# Patient Record
Sex: MALE | Race: OTHER | HISPANIC OR LATINO | Employment: FULL TIME | URBAN - METROPOLITAN AREA
[De-identification: names, ages, dates, MRNs, and addresses within clinical notes are randomized per-mention and may not be internally consistent; named-entity substitution may affect disease eponyms.]

---

## 2018-08-29 ENCOUNTER — HOSPITAL ENCOUNTER (EMERGENCY)
Facility: HOSPITAL | Age: 28
Discharge: LEFT BEFORE DISCHARGE | End: 2018-08-29
Attending: EMERGENCY MEDICINE
Payer: COMMERCIAL

## 2018-08-29 ENCOUNTER — APPOINTMENT (EMERGENCY)
Dept: RADIOLOGY | Facility: HOSPITAL | Age: 28
End: 2018-08-29
Payer: COMMERCIAL

## 2018-08-29 VITALS
OXYGEN SATURATION: 97 % | HEART RATE: 83 BPM | TEMPERATURE: 98.6 F | RESPIRATION RATE: 16 BRPM | SYSTOLIC BLOOD PRESSURE: 130 MMHG | WEIGHT: 250 LBS | DIASTOLIC BLOOD PRESSURE: 68 MMHG

## 2018-08-29 DIAGNOSIS — R07.9 CHEST PAIN: Primary | ICD-10-CM

## 2018-08-29 LAB
ANION GAP SERPL CALCULATED.3IONS-SCNC: 4 MMOL/L (ref 4–13)
ATRIAL RATE: 83 BPM
BASOPHILS # BLD AUTO: 0.05 THOUSANDS/ΜL (ref 0–0.1)
BASOPHILS NFR BLD AUTO: 1 % (ref 0–1)
BUN SERPL-MCNC: 12 MG/DL (ref 5–25)
CALCIUM SERPL-MCNC: 9.4 MG/DL (ref 8.3–10.1)
CHLORIDE SERPL-SCNC: 104 MMOL/L (ref 100–108)
CO2 SERPL-SCNC: 30 MMOL/L (ref 21–32)
CREAT SERPL-MCNC: 1.06 MG/DL (ref 0.6–1.3)
EOSINOPHIL # BLD AUTO: 0.03 THOUSAND/ΜL (ref 0–0.61)
EOSINOPHIL NFR BLD AUTO: 0 % (ref 0–6)
ERYTHROCYTE [DISTWIDTH] IN BLOOD BY AUTOMATED COUNT: 12.5 % (ref 11.6–15.1)
GFR SERPL CREATININE-BSD FRML MDRD: 95 ML/MIN/1.73SQ M
GLUCOSE SERPL-MCNC: 111 MG/DL (ref 65–140)
HCT VFR BLD AUTO: 45.3 % (ref 36.5–49.3)
HGB BLD-MCNC: 14.8 G/DL (ref 12–17)
IMM GRANULOCYTES # BLD AUTO: 0.03 THOUSAND/UL (ref 0–0.2)
IMM GRANULOCYTES NFR BLD AUTO: 0 % (ref 0–2)
LYMPHOCYTES # BLD AUTO: 2.28 THOUSANDS/ΜL (ref 0.6–4.47)
LYMPHOCYTES NFR BLD AUTO: 29 % (ref 14–44)
MCH RBC QN AUTO: 30.1 PG (ref 26.8–34.3)
MCHC RBC AUTO-ENTMCNC: 32.7 G/DL (ref 31.4–37.4)
MCV RBC AUTO: 92 FL (ref 82–98)
MONOCYTES # BLD AUTO: 0.55 THOUSAND/ΜL (ref 0.17–1.22)
MONOCYTES NFR BLD AUTO: 7 % (ref 4–12)
NEUTROPHILS # BLD AUTO: 4.85 THOUSANDS/ΜL (ref 1.85–7.62)
NEUTS SEG NFR BLD AUTO: 63 % (ref 43–75)
NRBC BLD AUTO-RTO: 0 /100 WBCS
P AXIS: 59 DEGREES
PLATELET # BLD AUTO: 305 THOUSANDS/UL (ref 149–390)
PMV BLD AUTO: 10.2 FL (ref 8.9–12.7)
POTASSIUM SERPL-SCNC: 4 MMOL/L (ref 3.5–5.3)
PR INTERVAL: 144 MS
QRS AXIS: 55 DEGREES
QRSD INTERVAL: 68 MS
QT INTERVAL: 338 MS
QTC INTERVAL: 397 MS
RBC # BLD AUTO: 4.92 MILLION/UL (ref 3.88–5.62)
SODIUM SERPL-SCNC: 138 MMOL/L (ref 136–145)
T WAVE AXIS: 24 DEGREES
TROPONIN I SERPL-MCNC: <0.02 NG/ML
VENTRICULAR RATE: 83 BPM
WBC # BLD AUTO: 7.79 THOUSAND/UL (ref 4.31–10.16)

## 2018-08-29 PROCEDURE — 36415 COLL VENOUS BLD VENIPUNCTURE: CPT | Performed by: EMERGENCY MEDICINE

## 2018-08-29 PROCEDURE — 96375 TX/PRO/DX INJ NEW DRUG ADDON: CPT

## 2018-08-29 PROCEDURE — 80048 BASIC METABOLIC PNL TOTAL CA: CPT | Performed by: EMERGENCY MEDICINE

## 2018-08-29 PROCEDURE — 85025 COMPLETE CBC W/AUTO DIFF WBC: CPT | Performed by: EMERGENCY MEDICINE

## 2018-08-29 PROCEDURE — 93005 ELECTROCARDIOGRAM TRACING: CPT

## 2018-08-29 PROCEDURE — 84484 ASSAY OF TROPONIN QUANT: CPT | Performed by: EMERGENCY MEDICINE

## 2018-08-29 PROCEDURE — 99285 EMERGENCY DEPT VISIT HI MDM: CPT

## 2018-08-29 PROCEDURE — 71046 X-RAY EXAM CHEST 2 VIEWS: CPT

## 2018-08-29 PROCEDURE — 96374 THER/PROPH/DIAG INJ IV PUSH: CPT

## 2018-08-29 PROCEDURE — 93010 ELECTROCARDIOGRAM REPORT: CPT | Performed by: INTERNAL MEDICINE

## 2018-08-29 RX ORDER — METOCLOPRAMIDE HYDROCHLORIDE 5 MG/ML
10 INJECTION INTRAMUSCULAR; INTRAVENOUS ONCE
Status: COMPLETED | OUTPATIENT
Start: 2018-08-29 | End: 2018-08-29

## 2018-08-29 RX ORDER — KETOROLAC TROMETHAMINE 30 MG/ML
15 INJECTION, SOLUTION INTRAMUSCULAR; INTRAVENOUS ONCE
Status: COMPLETED | OUTPATIENT
Start: 2018-08-29 | End: 2018-08-29

## 2018-08-29 RX ADMIN — KETOROLAC TROMETHAMINE 15 MG: 30 INJECTION, SOLUTION INTRAMUSCULAR at 13:10

## 2018-08-29 RX ADMIN — METOCLOPRAMIDE 10 MG: 5 INJECTION, SOLUTION INTRAMUSCULAR; INTRAVENOUS at 13:12

## 2018-08-29 NOTE — DISCHARGE INSTRUCTIONS
Dolor torácico, cuidados ambulatorios   INFORMACIÓN GENERAL:   El dolor torácico  puede ser causado por jenaro variedad de condiciones, algunas no gage serias y Gino March que sí son mortales  También podría ser causado por un ataque cardíaco o un coágulo de cecilia en un pulmón  En ocasiones el dolor torácico o la presión en el pecho pueden ser el resultado de jenaro kta circulación de la cecilia al corazón (angina)  Jenaro infección, inflamación o fractura en un hueso o cartílago en el tórax puede causar dolor o molestia  El dolor torácico también puede ser un síntoma de un problema digestivo, [place] el reflujo gastroesofágico ([place]) o jenaro úlcera estomacal    [place] siguientes son los síntomas más comunes:   · Carlos o sudoración     · Náuseas o vómito     · Falta del aire    · Yahoo o presión que se propaga del pecho a la espalda, mandíbula o brazo     · Ritmo cardíaco acelerado o lento     · Sensación de debilidad, cansancio o desmayo  Busque atención inmediata al presentar los siguientes síntomas:   · Alguno de los siguientes signos de un ataque cardíaco:      ¨ Estrechamiento, presión o dolor en el pecho que dura más de 5 minutos o regresa     ¨ Molestias o dolor en la espalda, [place], [place], estómago o brazo     ¨ Dificultad para respirar     ¨ Náuseas o vómito     ¨ Desvanecimiento o sudor frío y repentino, sobre todo con dificultad para respirar         · Incomodidad en el pecho que [place], aún con medicamentos    · Tos o vomita cecilia    · Deposiciones negras o con cecilia     · No puede dejar de vomitar o le duele al tragar  El tratamiento para el dolor torácico  puede incluir medicamentos para tratar ann marie síntomas mientras se determina la causa de ahn dolor en el pecho  Usted podría necesitar alguno de los siguientes:  · Los antiplaquetarios , [place] la aspirina, [place] a prevenir coagulas de [facility]  [place] ann marie antiplaquetarios exactamente [place] se le haya indicado   Estos medicamentos podrían causar mas probabilidad para sangrado y para desarrollar moretones  Si le last indicado el uso de aspirina, no tome acetaminofén o ibuprofeno en bruno lugar  · Le pueden recetar medicamento analgésico  para el dolor  Pregunte cuál es la cantidad que puede candace de forma kapoor  No fume:  Si usted fuma, nunca es tarde para dejar de fumar  El tabaquismo aumenta bruno riesgo de sufrir un ataque cardíaco y otras afecciones del corazón y el pulmón  Solicite a bruno proveedor de [employer] información si necesita ayuda para dejar de fumar  Acuda a la consulta de control con bruno proveedor de robbie según le indicaron:  Es posible que le ordenen más exámenes  Lo pueden remitir con un especialista, [place] el cardiólogo o el gastroenterólogo  Jason en jenaro libreta las preguntas que tenga para que no olvide hacerlas leatha ann marie citas médicas  ACUERDOS SOBRE BRUNO CUIDADO:   Usted tiene el derecho de participar en la planificación de bruno cuidado  Aprenda todo lo que pueda sobre bruno condición y [place] darle tratamiento  Discuta con ann marie médicos ann marie opciones de tratamiento para juntos decidir el cuidado que usted quiere recibir  Usted siempre tiene el derecho a rechazar bruno tratamiento  Esta información es sólo para uso en educación  Bruno intención no es darle un consejo médico sobre enfermedades o tratamientos  Colsulte con bruno Dorna Savage farmacéutico antes de seguir cualquier régimen médico para saber si es seguro y efectivo para usted  © 2014 [address] is for End User's use only and may not be sold, redistributed or otherwise used for commercial purposes  All illustrations and images included in CareNotes® are the copyrighted property of A D A M , Inc  or Jaime Tali

## 2018-08-29 NOTE — ED PROVIDER NOTES
History  Chief Complaint   Patient presents with    Chest Pain     Chest pain for 40 min  Radiates to L arm  Pain feels like pressure  Reports history of anxiety  Also has R side pain  Got into an argument with his wife prior symptom onset  Having substernal, sharp chest pain  Denies drug use  History provided by:  Patient   used: No    Chest Pain   Pain location:  Substernal area  Pain quality: sharp    Pain radiates to:  Does not radiate  Pain radiates to the back: no    Duration:  1 hour  Chronicity:  New  Relieved by:  None tried  Worsened by:  Nothing tried  Ineffective treatments:  None tried  Associated symptoms: nausea and shortness of breath ("yeah, like kind of")    Associated symptoms: no abdominal pain, no back pain, no cough, no diaphoresis, no dizziness, no fever, no numbness, no palpitations, not vomiting and no weakness    Risk factors: hypertension    Risk factors: no coronary artery disease, no diabetes mellitus and no smoking        None       Past Medical History:   Diagnosis Date    Chronic pain     Hypertension     Manic depressive disorder ([facility] [address] )        History reviewed  No pertinent surgical history  History reviewed  No pertinent family history  I have reviewed and agree with the history as documented  Social History   Substance Use Topics    Smoking status: Never Smoker    Smokeless tobacco: Never Used    Alcohol use No        Review of Systems   Constitutional: Negative for chills, diaphoresis and fever  Respiratory: Positive for shortness of breath ("yeah, like kind of")  Negative for cough and chest tightness  Cardiovascular: Positive for chest pain  Negative for palpitations and leg swelling  Gastrointestinal: Positive for nausea  Negative for abdominal pain and vomiting  Musculoskeletal: Negative for back pain and neck pain  Skin: Negative for pallor     Neurological: Negative for dizziness, syncope, facial asymmetry, speech difficulty, weakness, light-headedness and numbness  All other systems reviewed and are negative  Physical Exam  Physical Exam   Constitutional: He is oriented to person, place, and time  He appears well-developed and well-nourished  Non-toxic appearance  He does not have a sickly appearance  He does not appear ill  No distress  HENT:   Head: Normocephalic and atraumatic  Mouth/Throat: Oropharynx is clear and moist    Eyes: EOM are normal  Pupils are equal, round, and reactive to light  Neck: Normal range of motion  Neck supple  No JVD present  Cardiovascular: Normal rate, regular rhythm, S1 normal, S2 normal, normal heart sounds, intact distal pulses and normal pulses  Exam reveals no gallop and no friction rub  No murmur heard  Pulmonary/Chest: Effort normal and breath sounds normal  No respiratory distress  He has no wheezes  He has no rales  He exhibits no tenderness  Abdominal: Soft  Bowel sounds are normal  He exhibits no distension  There is no tenderness  There is no rebound and no guarding  Neurological: He is alert and oriented to person, place, and time  He has normal strength  No cranial nerve deficit or sensory deficit  Skin: Skin is warm and dry  No rash noted  He is not diaphoretic  No pallor  Nursing note and vitals reviewed        Vital Signs  ED Triage Vitals [08/29/18 1240]   Temperature Pulse Respirations Blood Pressure SpO2   98 6 °F (37 °C) 88 16 139/86 96 %      Temp Source Heart Rate Source Patient Position - Orthostatic VS BP Location FiO2 (%)   Oral Monitor Sitting Right arm --      Pain Score       5           Vitals:    08/29/18 1240 08/29/18 1433   BP: 139/86 130/68   Pulse: 88 83   Patient Position - Orthostatic VS: Sitting Lying       Visual Acuity      ED Medications  Medications   metoclopramide (REGLAN) injection 10 mg (10 mg Intravenous Given 8/29/18 1312)   ketorolac (TORADOL) injection 15 mg (15 mg Intravenous Given 8/29/18 1310)       Diagnostic Studies  Results Reviewed     Procedure Component Value Units Date/Time    Troponin I [[id number]]     Lab Status:  No result Specimen:  Blood     Troponin I [[id number]]  (Normal) Collected:  08/29/18 1310    Lab Status:  Final result Specimen:  Blood from Arm, Right Updated:  08/29/18 1400     Troponin I <0 02 ng/mL     Basic metabolic panel [[id number]] Collected:  08/29/18 1310    Lab Status:  Final result Specimen:  Blood from Arm, Right Updated:  08/29/18 1332     Sodium 138 mmol/L      Potassium 4 0 mmol/L      Chloride 104 mmol/L      CO2 30 mmol/L      ANION GAP 4 mmol/L      BUN 12 mg/dL      Creatinine 1 06 mg/dL      Glucose 111 mg/dL      Calcium 9 4 mg/dL      eGFR 95 ml/min/1 73sq m     Narrative:         National Kidney Disease Education Program recommendations are as follows:  GFR calculation is accurate only with a steady state creatinine  Chronic Kidney disease less than 60 ml/min/1 73 sq  meters  Kidney failure less than 15 ml/min/1 73 sq  meters      CBC and differential [[id number]] Collected:  08/29/18 1309    Lab Status:  Final result Specimen:  Blood from Arm, Right Updated:  08/29/18 1321     WBC 7 79 Thousand/uL      RBC 4 92 Million/uL      Hemoglobin 14 8 g/dL      Hematocrit 45 3 %      MCV 92 fL      MCH 30 1 pg      MCHC 32 7 g/dL      RDW 12 5 %      MPV 10 2 fL      Platelets 747 Thousands/uL      nRBC 0 /100 WBCs      Neutrophils Relative 63 %      Immat GRANS % 0 %      Lymphocytes Relative 29 %      Monocytes Relative 7 %      Eosinophils Relative 0 %      Basophils Relative 1 %      Neutrophils Absolute 4 85 Thousands/µL      Immature Grans Absolute 0 03 Thousand/uL      Lymphocytes Absolute 2 28 Thousands/µL      Monocytes Absolute 0 55 Thousand/µL      Eosinophils Absolute 0 03 Thousand/µL      Basophils Absolute 0 05 Thousands/µL                  XR chest 2 views   ED Interpretation by DO Kristine (08/29 1337)   No acute abnormalities      Final Result by Lori Ruiz MD (08/29 1330)      No acute cardiopulmonary disease  Workstation performed: [id number]                    Procedures  ECG 12 Lead Documentation  Date/Time: 8/29/2018 12:51 PM  Performed by: Jocelyn Aguila by: Monica Mahan     Indications / Diagnosis:  Chest pain  ECG reviewed by me, the ED Provider: yes    Patient location:  Bedside  Previous ECG:     Previous ECG:  Unavailable  Rate:     ECG rate:  83    ECG rate assessment: normal    Rhythm:     Rhythm: sinus rhythm    Ectopy:     Ectopy: none    QRS:     QRS axis:  Normal    QRS intervals:  Normal  ST segments:     ST segments:  Normal  T waves:     T waves: inverted      Inverted:  V3           Phone Contacts  ED Phone Contact    ED Course  ED Course as of Aug 29 1453   Wed Aug 29, 2018   1446 Pt states he has a family emergency and needs to leave  MDM  Number of Diagnoses or Management Options     Amount and/or Complexity of Data Reviewed  Clinical lab tests: ordered and reviewed  Tests in the radiology section of CPT®: ordered and reviewed  Tests in the medicine section of CPT®: reviewed and ordered      CritCare Time    Disposition  Final diagnoses:   Chest pain     Time reflects when diagnosis was documented in both MDM as applicable and the Disposition within this note     Time User Action Codes Description Comment    8/29/2018  2:46 PM Keyana Burns 48 [R07 9] Chest pain       ED Disposition     ED Disposition Condition Comment    AMA  Date: 8/29/2018  Patient: Jd Levine  Admitted: 8/29/2018 12:38 PM  Attending Provider: DO Baldemar Trivedihro Julianna or his authorized caregiver has made the decision for the patient to leave the emergency department against the advice o f his attending physician  He or his authorized caregiver has been informed and understands the inherent risks, including death, heart attack, permanent disability    He or his authorized caregiver has decided to accept the responsibility for this decisio n  [facility] and all necessary parties have been advised that he may return for further evaluation or treatment  His condition at time of discharge was stable  [facility] had current vital signs as follows:  /68 (BP Location: Right ar m)   Pulse 83   Temp 98 6 °F (37 °C) (Oral)   Resp 16   Wt 113 kg (250 lb)         Follow-up Information    None         There are no discharge medications for this patient  No discharge procedures on file      ED Provider  Electronically Signed by           Darwin Vyas 24, DO  08/29/18 1929

## 2018-10-15 ENCOUNTER — HOSPITAL ENCOUNTER (EMERGENCY)
Facility: HOSPITAL | Age: 28
Discharge: HOME | End: 2018-10-15
Attending: EMERGENCY MEDICINE
Payer: COMMERCIAL

## 2018-10-15 ENCOUNTER — APPOINTMENT (EMERGENCY)
Dept: RADIOLOGY | Facility: HOSPITAL | Age: 28
End: 2018-10-15
Payer: COMMERCIAL

## 2018-10-15 VITALS
WEIGHT: 230 LBS | RESPIRATION RATE: 16 BRPM | TEMPERATURE: 98.1 F | OXYGEN SATURATION: 98 % | SYSTOLIC BLOOD PRESSURE: 146 MMHG | DIASTOLIC BLOOD PRESSURE: 68 MMHG | HEART RATE: 71 BPM

## 2018-10-15 DIAGNOSIS — R07.9 CHEST PAIN: Primary | ICD-10-CM

## 2018-10-15 LAB
ALBUMIN SERPL BCP-MCNC: 3.9 G/DL (ref 3.5–5)
ALP SERPL-CCNC: 92 U/L (ref 46–116)
ALT SERPL W P-5'-P-CCNC: 41 U/L (ref 12–78)
ANION GAP SERPL CALCULATED.3IONS-SCNC: 5 MMOL/L (ref 4–13)
AST SERPL W P-5'-P-CCNC: 20 U/L (ref 5–45)
ATRIAL RATE: 72 BPM
BASOPHILS # BLD AUTO: 0.04 THOUSANDS/ΜL (ref 0–0.1)
BASOPHILS NFR BLD AUTO: 1 % (ref 0–1)
BILIRUB DIRECT SERPL-MCNC: 0.08 MG/DL (ref 0–0.2)
BILIRUB SERPL-MCNC: 0.21 MG/DL (ref 0.2–1)
BUN SERPL-MCNC: 14 MG/DL (ref 5–25)
CALCIUM SERPL-MCNC: 9.7 MG/DL (ref 8.3–10.1)
CHLORIDE SERPL-SCNC: 101 MMOL/L (ref 100–108)
CO2 SERPL-SCNC: 31 MMOL/L (ref 21–32)
CREAT SERPL-MCNC: 1.12 MG/DL (ref 0.6–1.3)
EOSINOPHIL # BLD AUTO: 0.03 THOUSAND/ΜL (ref 0–0.61)
EOSINOPHIL NFR BLD AUTO: 1 % (ref 0–6)
ERYTHROCYTE [DISTWIDTH] IN BLOOD BY AUTOMATED COUNT: 12.5 % (ref 11.6–15.1)
GFR SERPL CREATININE-BSD FRML MDRD: 89 ML/MIN/1.73SQ M
GLUCOSE SERPL-MCNC: 101 MG/DL (ref 65–140)
HCT VFR BLD AUTO: 45.1 % (ref 36.5–49.3)
HGB BLD-MCNC: 14.5 G/DL (ref 12–17)
IMM GRANULOCYTES # BLD AUTO: 0.02 THOUSAND/UL (ref 0–0.2)
IMM GRANULOCYTES NFR BLD AUTO: 0 % (ref 0–2)
LYMPHOCYTES # BLD AUTO: 2.19 THOUSANDS/ΜL (ref 0.6–4.47)
LYMPHOCYTES NFR BLD AUTO: 34 % (ref 14–44)
MAGNESIUM SERPL-MCNC: 2.1 MG/DL (ref 1.6–2.6)
MCH RBC QN AUTO: 29.2 PG (ref 26.8–34.3)
MCHC RBC AUTO-ENTMCNC: 32.2 G/DL (ref 31.4–37.4)
MCV RBC AUTO: 91 FL (ref 82–98)
MONOCYTES # BLD AUTO: 0.45 THOUSAND/ΜL (ref 0.17–1.22)
MONOCYTES NFR BLD AUTO: 7 % (ref 4–12)
NEUTROPHILS # BLD AUTO: 3.67 THOUSANDS/ΜL (ref 1.85–7.62)
NEUTS SEG NFR BLD AUTO: 57 % (ref 43–75)
NRBC BLD AUTO-RTO: 0 /100 WBCS
P AXIS: 20 DEGREES
PLATELET # BLD AUTO: 309 THOUSANDS/UL (ref 149–390)
PMV BLD AUTO: 10.6 FL (ref 8.9–12.7)
POTASSIUM SERPL-SCNC: 4.1 MMOL/L (ref 3.5–5.3)
PR INTERVAL: 154 MS
PROT SERPL-MCNC: 8.3 G/DL (ref 6.4–8.2)
QRS AXIS: 83 DEGREES
QRSD INTERVAL: 70 MS
QT INTERVAL: 368 MS
QTC INTERVAL: 402 MS
RBC # BLD AUTO: 4.96 MILLION/UL (ref 3.88–5.62)
SODIUM SERPL-SCNC: 137 MMOL/L (ref 136–145)
T WAVE AXIS: -10 DEGREES
TROPONIN I SERPL-MCNC: <0.02 NG/ML
VENTRICULAR RATE: 72 BPM
WBC # BLD AUTO: 6.4 THOUSAND/UL (ref 4.31–10.16)

## 2018-10-15 PROCEDURE — 85025 COMPLETE CBC W/AUTO DIFF WBC: CPT | Performed by: EMERGENCY MEDICINE

## 2018-10-15 PROCEDURE — 93005 ELECTROCARDIOGRAM TRACING: CPT

## 2018-10-15 PROCEDURE — 80076 HEPATIC FUNCTION PANEL: CPT | Performed by: EMERGENCY MEDICINE

## 2018-10-15 PROCEDURE — 96361 HYDRATE IV INFUSION ADD-ON: CPT

## 2018-10-15 PROCEDURE — 99285 EMERGENCY DEPT VISIT HI MDM: CPT

## 2018-10-15 PROCEDURE — 96374 THER/PROPH/DIAG INJ IV PUSH: CPT

## 2018-10-15 PROCEDURE — 96375 TX/PRO/DX INJ NEW DRUG ADDON: CPT

## 2018-10-15 PROCEDURE — 83735 ASSAY OF MAGNESIUM: CPT | Performed by: EMERGENCY MEDICINE

## 2018-10-15 PROCEDURE — 36415 COLL VENOUS BLD VENIPUNCTURE: CPT | Performed by: EMERGENCY MEDICINE

## 2018-10-15 PROCEDURE — 80048 BASIC METABOLIC PNL TOTAL CA: CPT | Performed by: EMERGENCY MEDICINE

## 2018-10-15 PROCEDURE — 71046 X-RAY EXAM CHEST 2 VIEWS: CPT

## 2018-10-15 PROCEDURE — 84484 ASSAY OF TROPONIN QUANT: CPT | Performed by: EMERGENCY MEDICINE

## 2018-10-15 PROCEDURE — 93010 ELECTROCARDIOGRAM REPORT: CPT | Performed by: INTERNAL MEDICINE

## 2018-10-15 RX ORDER — NAPROXEN 500 MG/1
500 TABLET ORAL 2 TIMES DAILY WITH MEALS
Qty: 20 TABLET | Refills: 0 | Status: SHIPPED | OUTPATIENT
Start: 2018-10-15 | End: 2021-05-21

## 2018-10-15 RX ORDER — TRAZODONE HYDROCHLORIDE 150 MG/1
150 TABLET ORAL
COMMUNITY
End: 2021-05-21

## 2018-10-15 RX ORDER — BUPROPION HYDROCHLORIDE 150 MG/1
150 TABLET, EXTENDED RELEASE ORAL 2 TIMES DAILY
COMMUNITY
End: 2019-01-25 | Stop reason: ALTCHOICE

## 2018-10-15 RX ORDER — RAMIPRIL 2.5 MG/1
2.5 CAPSULE ORAL 2 TIMES DAILY
COMMUNITY
End: 2021-05-21

## 2018-10-15 RX ORDER — DULOXETIN HYDROCHLORIDE 30 MG/1
30 CAPSULE, DELAYED RELEASE ORAL DAILY
COMMUNITY
End: 2019-01-25 | Stop reason: ALTCHOICE

## 2018-10-15 RX ORDER — OMEPRAZOLE 10 MG/1
10 CAPSULE, DELAYED RELEASE ORAL DAILY
COMMUNITY
End: 2021-05-21

## 2018-10-15 RX ORDER — METOCLOPRAMIDE HYDROCHLORIDE 5 MG/ML
10 INJECTION INTRAMUSCULAR; INTRAVENOUS ONCE
Status: COMPLETED | OUTPATIENT
Start: 2018-10-15 | End: 2018-10-15

## 2018-10-15 RX ORDER — KETOROLAC TROMETHAMINE 30 MG/ML
15 INJECTION, SOLUTION INTRAMUSCULAR; INTRAVENOUS ONCE
Status: COMPLETED | OUTPATIENT
Start: 2018-10-15 | End: 2018-10-15

## 2018-10-15 RX ADMIN — METOCLOPRAMIDE 10 MG: 5 INJECTION, SOLUTION INTRAMUSCULAR; INTRAVENOUS at 11:10

## 2018-10-15 RX ADMIN — SODIUM CHLORIDE 1000 ML: 0.9 INJECTION, SOLUTION INTRAVENOUS at 11:07

## 2018-10-15 RX ADMIN — KETOROLAC TROMETHAMINE 15 MG: 30 INJECTION, SOLUTION INTRAMUSCULAR at 11:07

## 2018-10-15 NOTE — ED PROVIDER NOTES
History  Chief Complaint   Patient presents with    Chest Pain     pt c/o mid chest pain x3 days, left arm pain with movement, sob with exertion     28 YO male presents with chest pain for the last day  States this began yesterday, constant, located in the Left anterior chest, parasternal, non-radiating, aching and sharp, worse with deep breathing and raising arms above the head  Pt has had some mild DAVIES associated with this  He denies nausea, vomiting, lightheadedness, palpitations, diaphoresis  Pt has a Hx of HTN, he denies DM, hypercholesterolemia, smoking  States he has had similar discomfort in the past but this is more aching where previously it was a pressure  This is non-exertional  Pt was seen today at the [facility] for this and sent to the ED for further evaluation  Pt denies F/C/N/V/D/C, no dysuria, burning on urination or blood in urine  History provided by:  Patient   used: No    Chest Pain   Pain location:  L chest  Pain quality: aching    Pain radiates to:  Does not radiate  Pain severity:  Moderate  Onset quality:  Gradual  Duration:  1 day  Timing:  Constant  Progression:  Waxing and waning  Chronicity:  Recurrent  Context: breathing, movement and raising an arm    Relieved by:  Nothing  Worsened by:  Exertion, deep breathing and coughing  Ineffective treatments:  None tried  Associated symptoms: no abdominal pain, no back pain, no claudication, no diaphoresis, no dizziness, no fever, no headache, no nausea, no shortness of breath, not vomiting and no weakness        Prior to Admission Medications   Prescriptions Last Dose Informant Patient Reported? Taking?    DULoxetine (CYMBALTA) 30 mg delayed release capsule   Yes Yes   Sig: Take 20 mg by mouth daily   RaNITidine HCl (RANITIDINE 75 PO)   Yes Yes   Sig: Take by mouth   buPROPion (ZYBAN) 150 MG 12 hr tablet   Yes Yes   Sig: Take 150 mg by mouth 2 (two) times a day   omeprazole (PriLOSEC) 10 mg delayed release capsule   Yes Yes   Sig: Take 10 mg by mouth daily   ramipril (ALTACE) 2 5 mg capsule   Yes Yes   Sig: Take 2 5 mg by mouth daily   traZODone (DESYREL) 150 mg tablet   Yes Yes   Sig: Take 150 mg by mouth daily at bedtime      Facility-Administered Medications: None       Past Medical History:   Diagnosis Date    Chronic pain     Hypertension     Manic depressive disorder ([facility] [address] )        History reviewed  No pertinent surgical history  History reviewed  No pertinent family history  I have reviewed and agree with the history as documented  Social History   Substance Use Topics    Smoking status: Never Smoker    Smokeless tobacco: Never Used    Alcohol use No        Review of Systems   Constitutional: Negative for diaphoresis and fever  HENT: Negative for dental problem  Eyes: Negative for visual disturbance  Respiratory: Negative for shortness of breath  Cardiovascular: Positive for chest pain  Negative for claudication  Gastrointestinal: Negative for abdominal pain, nausea and vomiting  Genitourinary: Negative for dysuria and frequency  Musculoskeletal: Negative for back pain, neck pain and neck stiffness  Skin: Negative for rash  Neurological: Negative for dizziness, weakness, light-headedness and headaches  Psychiatric/Behavioral: Negative for agitation, behavioral problems and confusion  All other systems reviewed and are negative  Physical Exam  Physical Exam   Constitutional: He is oriented to person, place, and time  He appears well-developed and well-nourished  HENT:   Head: Normocephalic and atraumatic  Eyes: EOM are normal    Neck: Normal range of motion  Cardiovascular: Normal rate, regular rhythm and normal heart sounds  Pulmonary/Chest: Effort normal and breath sounds normal    Palpation over the Left anterior chest wall reproduces pt's discomfort  Abdominal: Soft  Musculoskeletal: Normal range of motion     Neurological: He is alert and oriented to person, place, and time    Skin: Skin is warm and dry  Psychiatric: He has a normal mood and affect  His behavior is normal    Nursing note and vitals reviewed        Vital Signs  ED Triage Vitals   Temperature Pulse Respirations Blood Pressure SpO2   10/15/18 1036 10/15/18 1036 10/15/18 1036 10/15/18 1036 10/15/18 1036   98 1 °F (36 7 °C) 68 16 (!) 173/93 98 %      Temp Source Heart Rate Source Patient Position - Orthostatic VS BP Location FiO2 (%)   10/15/18 1036 10/15/18 1036 10/15/18 1036 10/15/18 1036 --   Oral Monitor Sitting Right arm       Pain Score       10/15/18 1107       3           Vitals:    10/15/18 1036 10/15/18 1104 10/15/18 1210   BP: (!) 173/93 (!) 175/91 146/68   Pulse: 68 73 71   Patient Position - Orthostatic VS: Sitting Sitting Lying       Visual Acuity      ED Medications  Medications   sodium chloride 0 9 % bolus 1,000 mL (0 mL Intravenous Stopped 10/15/18 1222)   ketorolac (TORADOL) injection 15 mg (15 mg Intravenous Given 10/15/18 1107)   metoclopramide (REGLAN) injection 10 mg (10 mg Intravenous Given 10/15/18 1110)       Diagnostic Studies  Results Reviewed     Procedure Component Value Units Date/Time    Troponin I [[id number]]  (Normal) Collected:  10/15/18 1104    Lab Status:  Final result Specimen:  Blood from Arm, Left Updated:  10/15/18 1130     Troponin I <0 02 ng/mL     Basic metabolic panel [[id number]] Collected:  10/15/18 1104    Lab Status:  Final result Specimen:  Blood from Arm, Left Updated:  10/15/18 1129     Sodium 137 mmol/L      Potassium 4 1 mmol/L      Chloride 101 mmol/L      CO2 31 mmol/L      ANION GAP 5 mmol/L      BUN 14 mg/dL      Creatinine 1 12 mg/dL      Glucose 101 mg/dL      Calcium 9 7 mg/dL      eGFR 89 ml/min/1 73sq m     Narrative:         National Kidney Disease Education Program recommendations are as follows:  GFR calculation is accurate only with a steady state creatinine  Chronic Kidney disease less than 60 ml/min/1 73 sq  meters  Kidney failure less than 15 ml/min/1 73 sq  meters      Hepatic function panel [[id number]]  (Abnormal) Collected:  10/15/18 1104    Lab Status:  Final result Specimen:  Blood from Arm, Left Updated:  10/15/18 1129     Total Bilirubin 0 21 mg/dL      Bilirubin, Direct 0 08 mg/dL      Alkaline Phosphatase 92 U/L      AST 20 U/L      ALT 41 U/L      Total Protein 8 3 (H) g/dL      Albumin 3 9 g/dL     Magnesium [[id number]]  (Normal) Collected:  10/15/18 1104    Lab Status:  Final result Specimen:  Blood from Arm, Left Updated:  10/15/18 1129     Magnesium 2 1 mg/dL     CBC and differential [[id number]] Collected:  10/15/18 1104    Lab Status:  Final result Specimen:  Blood from Arm, Left Updated:  10/15/18 1113     WBC 6 40 Thousand/uL      RBC 4 96 Million/uL      Hemoglobin 14 5 g/dL      Hematocrit 45 1 %      MCV 91 fL      MCH 29 2 pg      MCHC 32 2 g/dL      RDW 12 5 %      MPV 10 6 fL      Platelets 596 Thousands/uL      nRBC 0 /100 WBCs      Neutrophils Relative 57 %      Immat GRANS % 0 %      Lymphocytes Relative 34 %      Monocytes Relative 7 %      Eosinophils Relative 1 %      Basophils Relative 1 %      Neutrophils Absolute 3 67 Thousands/µL      Immature Grans Absolute 0 02 Thousand/uL      Lymphocytes Absolute 2 19 Thousands/µL      Monocytes Absolute 0 45 Thousand/µL      Eosinophils Absolute 0 03 Thousand/µL      Basophils Absolute 0 04 Thousands/µL                  XR chest 2 views   ED Interpretation by Matt Gayle MD (10/15 1143)   No PNA/PTX                 Procedures  ECG 12 Lead Documentation  Date/Time: 10/15/2018 1:09 PM  Performed by: Jessica Valencia by: Nickolas ROSAS     ECG reviewed by me, the ED Provider: yes    Patient location:  ED  Interpretation:     Interpretation: normal    Rate:     ECG rate:  72    ECG rate assessment: normal    Rhythm:     Rhythm: sinus rhythm    QRS:     QRS axis:  Normal    QRS intervals:  Normal  Conduction:     Conduction: normal    ST segments:     ST segments:  Normal  T waves:     T waves: normal    Other findings:     Other findings: early repolarization             Phone Contacts  ED Phone Contact    ED Course  ED Course as of Oct 15 1310   Mon Oct 15, 2018   1218 Spoke with pt regarding desire to repeat ECG and troponin after 3 hours  Pt states he does not plan on staying for further evaluation  Did explain there could be risk involved with leaving though there is low concern for MI as pt has had constant chest pain since yesterday and a negative troponin  Did urge pt to return for worsening or changing chest pain  MDM  Number of Diagnoses or Management Options  Chest pain: new and requires workup  Diagnosis management comments: 1  Chest pain - Pt with reproducible chest discomfort, no known cardiac issues  Risk factor in HTN  Will check ECG and troponin, electrolytes, CBC, CXR  Give fluids, NSAIDs  Amount and/or Complexity of Data Reviewed  Clinical lab tests: ordered and reviewed  Tests in the radiology section of CPT®: ordered and reviewed  Independent visualization of images, tracings, or specimens: yes    Patient Progress  Patient progress: stable    CritCare Time    Disposition  Final diagnoses:   Chest pain     Time reflects when diagnosis was documented in both MDM as applicable and the Disposition within this note     Time User Action Codes Description Comment    10/15/2018 12:18 PM Raphael Osorio [R07 9] Chest pain       ED Disposition     ED Disposition Condition Comment    Discharge  [facility] discharge to home/self care      Condition at discharge: Stable        Follow-up Information    None         Discharge Medication List as of 10/15/2018 12:21 PM      START taking these medications    Details   naproxen (NAPROSYN) 500 mg tablet Take 1 tablet (500 mg total) by mouth 2 (two) times a day with meals for 10 days, Starting Mon 10/15/2018, Until Thu 10/25/2018, Print         CONTINUE these medications which have NOT CHANGED    Details   buPROPion (ZYBAN) 150 MG 12 hr tablet Take 150 mg by mouth 2 (two) times a day, Historical Med      DULoxetine (CYMBALTA) 30 mg delayed release capsule Take 20 mg by mouth daily, Historical Med      omeprazole (PriLOSEC) 10 mg delayed release capsule Take 10 mg by mouth daily, Historical Med      ramipril (ALTACE) 2 5 mg capsule Take 2 5 mg by mouth daily, Historical Med      RaNITidine HCl (RANITIDINE 75 PO) Take by mouth, Historical Med      traZODone (DESYREL) 150 mg tablet Take 150 mg by mouth daily at bedtime, Historical Med           No discharge procedures on file      ED Provider  Electronically Signed by           Manuel Alvarez MD  10/15/18 1127

## 2018-10-15 NOTE — DISCHARGE INSTRUCTIONS
Return to the ER if your chest pain changes in quality or location, or becomes associated with shortness of breath, lightheadedness, vomiting or sweating  Take the Naprosyn twice daily for discomfort, continue this for the next 5-10 days  Chest Pain   WHAT YOU NEED TO KNOW:   Chest pain can be caused by a range of conditions, from not serious to life-threatening  Chest pain can be a symptom of a digestive problem, such as acid reflux or a stomach ulcer  An anxiety attack or a strong emotion, such as anger, can also cause chest pain  Infection, inflammation, or a fracture in the bones or cartilage in your chest can cause pain or discomfort  Sometimes chest pain or pressure is caused by poor blood flow to your heart (angina)  Chest pain may also be caused by life-threatening conditions such as a heart attack or blood clot in your lungs  DISCHARGE INSTRUCTIONS:   Call 911 if:   · You have any of the following signs of a heart attack:      ¨ Squeezing, pressure, or pain in your chest that lasts longer than 5 minutes or returns    ¨ Discomfort or pain in your back, neck, jaw, stomach, or arm     ¨ Trouble breathing    ¨ Nausea or vomiting    ¨ Lightheadedness or a sudden cold sweat, especially with chest pain or trouble breathing    Return to the emergency department if:   · You have chest discomfort that gets worse, even with medicine  · You cough or vomit blood  · Your bowel movements are black or bloody  · You cannot stop vomiting, or it hurts to swallow  Contact your healthcare provider if:   · You have questions or concerns about your condition or care  Medicines:   · Medicines  may be given to treat the cause of your chest pain  Examples include pain medicine, anxiety medicine, or medicines to increase blood flow to your heart       · Do not take certain medicines without asking your healthcare provider first   These include NSAIDs, herbal or vitamin supplements, or hormones (estrogen or progestin)  · Take your medicine as directed  Contact your healthcare provider if you think your medicine is not helping or if you have side effects  Tell him or her if you are allergic to any medicine  Keep a list of the medicines, vitamins, and herbs you take  Include the amounts, and when and why you take them  Bring the list or the pill bottles to follow-up visits  Carry your medicine list with you in case of an emergency  Follow up with your healthcare provider within 72 hours, or as directed: You may need to return for more tests to find the cause of your chest pain  You may be referred to a specialist, such as a cardiologist or gastroenterologist  Write down your questions so you remember to ask them during your visits  Healthy living tips: The following are general healthy guidelines  If your chest pain is caused by a heart problem, your healthcare provider will give you specific guidelines to follow  · Do not smoke  Nicotine and other chemicals in cigarettes and cigars can cause lung and heart damage  Ask your healthcare provider for information if you currently smoke and need help to quit  E-cigarettes or smokeless tobacco still contain nicotine  Talk to your healthcare provider before you use these products  · Eat a variety of healthy, low-fat foods  Healthy foods include fruits, vegetables, whole-grain breads, low-fat dairy products, beans, lean meats, and fish  Ask for more information about a heart healthy diet  · Ask about activity  Your healthcare provider will tell you which activities to limit or avoid  Ask when you can drive, return to work, and have sex  Ask about the best exercise plan for you  · Maintain a healthy weight  Ask your healthcare provider how much you should weigh  Ask him or her to help you create a weight loss plan if you are overweight  · Get the flu and pneumonia vaccines  All adults should get the influenza (flu) vaccine   Get it every year as soon as it becomes available  The pneumococcal vaccine is given to adults aged 72 years or older  The vaccine is given every 5 years to prevent pneumococcal disease, such as pneumonia  © 2017 [employer] Information is for End User's use only and may not be sold, redistributed or otherwise used for commercial purposes  All illustrations and images included in CareNotes® are the copyrighted property of [employer]  or Jaime Cesar  The above information is an  only  It is not intended as medical advice for individual conditions or treatments  Talk to your doctor, nurse or pharmacist before following any medical regimen to see if it is safe and effective for you

## 2018-11-06 ENCOUNTER — OFFICE VISIT (OUTPATIENT)
Dept: FAMILY MEDICINE CLINIC | Facility: CLINIC | Age: 28
End: 2018-11-06
Payer: COMMERCIAL

## 2018-11-06 VITALS
SYSTOLIC BLOOD PRESSURE: 140 MMHG | BODY MASS INDEX: 31.47 KG/M2 | WEIGHT: 224.8 LBS | RESPIRATION RATE: 18 BRPM | HEART RATE: 86 BPM | DIASTOLIC BLOOD PRESSURE: 92 MMHG | HEIGHT: 71 IN | TEMPERATURE: 97.1 F

## 2018-11-06 DIAGNOSIS — Z00.00 WELL ADULT EXAM: Primary | ICD-10-CM

## 2018-11-06 DIAGNOSIS — Z13.6 SCREENING FOR CARDIOVASCULAR CONDITION: ICD-10-CM

## 2018-11-06 DIAGNOSIS — Z11.1 PPD SCREENING TEST: ICD-10-CM

## 2018-11-06 LAB
ALBUMIN SERPL BCP-MCNC: 4.1 G/DL (ref 3.5–5)
ALP SERPL-CCNC: 101 U/L (ref 46–116)
ALT SERPL W P-5'-P-CCNC: 42 U/L (ref 12–78)
ANION GAP SERPL CALCULATED.3IONS-SCNC: 6 MMOL/L (ref 4–13)
AST SERPL W P-5'-P-CCNC: 22 U/L (ref 5–45)
BILIRUB SERPL-MCNC: 0.41 MG/DL (ref 0.2–1)
BUN SERPL-MCNC: 11 MG/DL (ref 5–25)
CALCIUM SERPL-MCNC: 9.6 MG/DL (ref 8.3–10.1)
CHLORIDE SERPL-SCNC: 104 MMOL/L (ref 100–108)
CHOLEST SERPL-MCNC: 141 MG/DL (ref 50–200)
CO2 SERPL-SCNC: 27 MMOL/L (ref 21–32)
CREAT SERPL-MCNC: 0.97 MG/DL (ref 0.6–1.3)
GFR SERPL CREATININE-BSD FRML MDRD: 106 ML/MIN/1.73SQ M
GLUCOSE P FAST SERPL-MCNC: 85 MG/DL (ref 65–99)
HDLC SERPL-MCNC: 57 MG/DL (ref 40–60)
LDLC SERPL CALC-MCNC: 69 MG/DL (ref 0–100)
NONHDLC SERPL-MCNC: 84 MG/DL
POTASSIUM SERPL-SCNC: 4.2 MMOL/L (ref 3.5–5.3)
PROT SERPL-MCNC: 8.4 G/DL (ref 6.4–8.2)
SODIUM SERPL-SCNC: 137 MMOL/L (ref 136–145)
TRIGL SERPL-MCNC: 73 MG/DL
TSH SERPL DL<=0.05 MIU/L-ACNC: 1.25 UIU/ML (ref 0.36–3.74)

## 2018-11-06 PROCEDURE — 84443 ASSAY THYROID STIM HORMONE: CPT | Performed by: NURSE PRACTITIONER

## 2018-11-06 PROCEDURE — 99385 PREV VISIT NEW AGE 18-39: CPT | Performed by: NURSE PRACTITIONER

## 2018-11-06 PROCEDURE — 86580 TB INTRADERMAL TEST: CPT | Performed by: NURSE PRACTITIONER

## 2018-11-06 PROCEDURE — 80053 COMPREHEN METABOLIC PANEL: CPT | Performed by: NURSE PRACTITIONER

## 2018-11-06 PROCEDURE — 36415 COLL VENOUS BLD VENIPUNCTURE: CPT | Performed by: NURSE PRACTITIONER

## 2018-11-06 PROCEDURE — 80061 LIPID PANEL: CPT | Performed by: NURSE PRACTITIONER

## 2018-11-06 NOTE — PROGRESS NOTES
FAMILY [facility] HEALTH MAINTENANCE OFFICE VISIT  [facility] - [facility]    NAME: Terrell Marsh  AGE: 29 y o  SEX: male  : 1990     DATE: 2018    Assessment and Plan     Problem List Items Addressed This Visit     None      Visit Diagnoses     Well adult exam    -  Primary    Screening for cardiovascular condition        Relevant Orders    Lipid panel    Comprehensive metabolic panel    TSH, 3rd generation with Free T4 reflex    PPD screening test        Relevant Orders    TB Skin Test (Completed)        Patient Instructions   Ramipril up to 5 mg recheck BP at follow up visit  · Patient Counseling:   · Nutrition: Stressed importance of a well balanced diet, moderation of sodium/saturated fat, caloric balance and sufficient intake of fiber  · Exercise: Stressed the importance of regular exercise with a goal of 150 minutes per week  · Dental Health: Discussed daily flossing and brushing and regular dental visits   · Injury Prevention: Discussed Safety Belts, Safety Helmets, and Smoke Detectors    · Immunizations reviewed  Patient states he has updated adacel proof at home  ·   · Discussed the patient's BMI with him  The BMI is above average; BMI management plan is completed     Return for need follow up on hypertension  also 2 days for ppd read  Chief Complaint     Chief Complaint   Patient presents with    Annual Exam       History of Present Illness     Patient is here for for  physical  Forms here in office  He also has been going to the  [facility] for fibromyalgia and htn  He wants to leave their services  Well Adult Physical   Patient here for a comprehensive physical exam       Diet and Physical Activity  Diet: well balanced diet  Weight concerns: Patient has class 1 obesity (BMI 30-34  9)  Exercise: states due to chornic pain can not      Depression Screen  PHQ-9 Depression Screening    PHQ-9:    Frequency of the following problems over the past two weeks:       Little interest or pleasure in doing things:  0 - not at all  Feeling down, depressed, or hopeless:  0 - not at all  PHQ-2 Score:  0          General Health    Vision: most recent eye exam <1 year and wears glasses  Dental: regular dental visits, brushes teeth twice daily and flosses teeth occasionally    Reproductive Health  Sexually active  The following portions of the patient's history were reviewed and updated as appropriate: allergies, current medications, past family history, past medical history, past social history, past surgical history and problem list     Review of Systems     Review of Systems   Constitutional: Negative for unexpected weight change  HENT: Negative for trouble swallowing  Eyes: Negative for visual disturbance  Respiratory: Negative for chest tightness and shortness of breath  Cardiovascular: Positive for chest pain  Negative for palpitations  Gastrointestinal: Negative for constipation  Endocrine: Negative for polyphagia and polyuria  Genitourinary: Negative for difficulty urinating  Musculoskeletal: Positive for back pain (used to be on tramadol) and gait problem  Skin: Negative for rash  Neurological: Negative for dizziness, light-headedness and headaches  Psychiatric/Behavioral: Negative for dysphoric mood, sleep disturbance and suicidal ideas  The patient is not nervous/anxious  Past Medical History     Past Medical History:   Diagnosis Date    Chronic pain     Hypertension     Manic depressive disorder ([address] )        Past Surgical History     History reviewed  No pertinent surgical history      Social History     Social History     Social History    Marital status: /Civil Union     Spouse name: N/A    Number of children: N/A    Years of education: N/A     Social History Main Topics    Smoking status: Never Smoker    Smokeless tobacco: Never Used    Alcohol use No    Drug use: No    Sexual activity: Not Asked     Other Topics Concern    None     Social History Narrative    None       Family History     Family History   Problem Relation Age of Onset    Hypertension Mother     Diabetes Father        Current Medications       Current Outpatient Prescriptions:     buPROPion (ZYBAN) 150 MG 12 hr tablet, Take 150 mg by mouth 2 (two) times a day, Disp: , Rfl:     DULoxetine (CYMBALTA) 30 mg delayed release capsule, Take 20 mg by mouth daily, Disp: , Rfl:     naproxen (NAPROSYN) 500 mg tablet, Take 1 tablet (500 mg total) by mouth 2 (two) times a day with meals for 10 days, Disp: 20 tablet, Rfl: 0    omeprazole (PriLOSEC) 10 mg delayed release capsule, Take 10 mg by mouth daily, Disp: , Rfl:     ramipril (ALTACE) 2 5 mg capsule, Take 2 5 mg by mouth daily, Disp: , Rfl:     RaNITidine HCl (RANITIDINE 75 PO), Take by mouth, Disp: , Rfl:     traZODone (DESYREL) 150 mg tablet, Take 150 mg by mouth daily at bedtime, Disp: , Rfl:      Allergies     No Known Allergies    Objective     /92 (BP Location: Left arm, Patient Position: Sitting, Cuff Size: Adult)   Pulse 86   Temp (!) 97 1 °F (36 2 °C) (Temporal)   Resp 18   Ht 5' 11 25" (1 81 m)   Wt 102 kg (224 lb 12 8 oz)   BMI 31 13 kg/m²      Physical Exam   Constitutional: He is oriented to person, place, and time  Vital signs are normal  He appears well-developed and well-nourished  He is active  He does not have a sickly appearance  He does not appear ill  HENT:   Head: Normocephalic and atraumatic  Right Ear: Tympanic membrane normal    Left Ear: Tympanic membrane normal    Nose: Nose normal    Mouth/Throat: Uvula is midline, oropharynx is clear and moist and mucous membranes are normal    Eyes: Pupils are equal, round, and reactive to light  Neck: Normal range of motion  No JVD present  No thyromegaly present  Cardiovascular: Normal rate, regular rhythm, S1 normal, S2 normal and normal heart sounds      No murmur heard   Pulmonary/Chest: Effort normal and breath sounds normal    Abdominal: Soft  Normal appearance and bowel sounds are normal  There is no hepatosplenomegaly  There is no tenderness  No hernia  Musculoskeletal: Normal range of motion  Lymphadenopathy:     He has no cervical adenopathy  Neurological: He is alert and oriented to person, place, and time  Reflex Scores:       Patellar reflexes are 2+ on the right side and 2+ on the left side  Skin: Skin is warm and dry  No rash noted  Psychiatric: He has a normal mood and affect   His behavior is normal  Thought content normal          No exam data present    Health Maintenance     Health Maintenance   Topic Date Due    DTaP,Tdap,and Td Vaccines (1 - Tdap) 08/28/2011    Depression Screening PHQ  11/06/2019    INFLUENZA VACCINE  Completed     Immunization History   Administered Date(s) Administered    Influenza 10/18/2018    Tuberculin Skin Test-PPD Intradermal 11/06/2018       Seda Hussein, [address]

## 2018-11-07 ENCOUNTER — TELEPHONE (OUTPATIENT)
Dept: FAMILY MEDICINE CLINIC | Facility: CLINIC | Age: 28
End: 2018-11-07

## 2018-11-07 NOTE — TELEPHONE ENCOUNTER
----- Message from [facility], [address] sent at 11/7/2018  1:56 PM EST -----  Cholesterol, thyroid and cmp all normal

## 2018-11-08 ENCOUNTER — CLINICAL SUPPORT (OUTPATIENT)
Dept: FAMILY MEDICINE CLINIC | Facility: CLINIC | Age: 28
End: 2018-11-08

## 2018-11-08 DIAGNOSIS — Z11.1 ENCOUNTER FOR PPD SKIN TEST READING: Primary | ICD-10-CM

## 2018-11-08 LAB
INDURATION: 0 MM
TB SKIN TEST: NEGATIVE

## 2018-11-08 PROCEDURE — RECHECK

## 2018-11-21 ENCOUNTER — OFFICE VISIT (OUTPATIENT)
Dept: FAMILY MEDICINE CLINIC | Facility: CLINIC | Age: 28
End: 2018-11-21
Payer: COMMERCIAL

## 2018-11-21 VITALS
DIASTOLIC BLOOD PRESSURE: 90 MMHG | WEIGHT: 243 LBS | HEIGHT: 71 IN | BODY MASS INDEX: 34.02 KG/M2 | TEMPERATURE: 97 F | SYSTOLIC BLOOD PRESSURE: 132 MMHG

## 2018-11-21 DIAGNOSIS — M25.50 ARTHRALGIA, UNSPECIFIED JOINT: ICD-10-CM

## 2018-11-21 DIAGNOSIS — R07.9 CHEST PAIN, UNSPECIFIED TYPE: ICD-10-CM

## 2018-11-21 DIAGNOSIS — I10 ESSENTIAL HYPERTENSION: Primary | ICD-10-CM

## 2018-11-21 DIAGNOSIS — G89.29 CHRONIC BILATERAL LOW BACK PAIN WITHOUT SCIATICA: ICD-10-CM

## 2018-11-21 DIAGNOSIS — R20.2 PARESTHESIAS: ICD-10-CM

## 2018-11-21 DIAGNOSIS — M54.50 CHRONIC BILATERAL LOW BACK PAIN WITHOUT SCIATICA: ICD-10-CM

## 2018-11-21 PROCEDURE — 1036F TOBACCO NON-USER: CPT | Performed by: FAMILY MEDICINE

## 2018-11-21 PROCEDURE — 99214 OFFICE O/P EST MOD 30 MIN: CPT | Performed by: FAMILY MEDICINE

## 2018-11-21 PROCEDURE — 3008F BODY MASS INDEX DOCD: CPT | Performed by: FAMILY MEDICINE

## 2018-11-21 NOTE — PROGRESS NOTES
Assessment/Plan:    No problem-specific Assessment & Plan notes found for this encounter  Diagnoses and all orders for this visit:    Essential hypertension  -     Echo complete with contrast if indicated; Future    Arthralgia, unspecified joint  -     CBC and differential; Future  -     Sedimentation rate, automated; Future  -     C-reactive protein; Future  -     CK  -     RADHA Screen w/ Reflex to Titer/Pattern; Future  -     RF Screen w/ Reflex to Titer; Future  -     Lyme Antibody Profile with reflex to WB; Future    Chest pain, unspecified type  -     Echo complete with contrast if indicated; Future          Subjective:      Patient ID: Heather Parisi is a 29 y o  male  Hypertension   This is a chronic problem  The current episode started more than 1 month ago  The problem has been gradually improving since onset  The problem is controlled  Associated symptoms include chest pain  Pertinent negatives include no anxiety, blurred vision, headaches, malaise/fatigue, peripheral edema or shortness of breath  There are no associated agents to hypertension  Risk factors for coronary artery disease include male gender  Past treatments include ACE inhibitors  The current treatment provides moderate improvement  The following portions of the patient's history were reviewed and updated as appropriate: allergies, current medications, past family history, past medical history, past social history, past surgical history and problem list       Review of Systems   Constitutional: Negative for activity change, appetite change, malaise/fatigue and unexpected weight change  Eyes: Negative for blurred vision  Respiratory: Negative for shortness of breath  Cardiovascular: Positive for chest pain  Musculoskeletal: Positive for back pain  Neurological: Negative for headaches           Objective:      /90   Temp (!) 97 °F (36 1 °C)   Ht 5' 11" (1 803 m)   Wt 110 kg (243 lb)   BMI 33 89 kg/m² Physical Exam   Constitutional: He appears well-developed and well-nourished  Neck: No thyromegaly present  Cardiovascular: Normal rate, regular rhythm and normal heart sounds  Pulmonary/Chest: Breath sounds normal    Musculoskeletal: He exhibits tenderness  He exhibits no edema  Lumbar back: He exhibits decreased range of motion and tenderness  Lymphadenopathy:     He has no cervical adenopathy  Vitals reviewed

## 2019-01-16 ENCOUNTER — APPOINTMENT (OUTPATIENT)
Dept: LAB | Facility: HOSPITAL | Age: 29
End: 2019-01-16
Payer: COMMERCIAL

## 2019-01-16 ENCOUNTER — HOSPITAL ENCOUNTER (OUTPATIENT)
Dept: RADIOLOGY | Facility: HOSPITAL | Age: 29
Discharge: HOME | End: 2019-01-16
Payer: COMMERCIAL

## 2019-01-16 DIAGNOSIS — G89.29 CHRONIC BILATERAL LOW BACK PAIN WITHOUT SCIATICA: ICD-10-CM

## 2019-01-16 DIAGNOSIS — M54.50 CHRONIC BILATERAL LOW BACK PAIN WITHOUT SCIATICA: ICD-10-CM

## 2019-01-16 DIAGNOSIS — M25.50 ARTHRALGIA, UNSPECIFIED JOINT: ICD-10-CM

## 2019-01-16 DIAGNOSIS — R20.2 PARESTHESIAS: ICD-10-CM

## 2019-01-16 LAB
BASOPHILS # BLD AUTO: 0.04 THOUSANDS/ΜL (ref 0–0.1)
BASOPHILS NFR BLD AUTO: 1 % (ref 0–1)
CK SERPL-CCNC: 79 U/L (ref 39–308)
CRP SERPL QL: 3.8 MG/L
EOSINOPHIL # BLD AUTO: 0.03 THOUSAND/ΜL (ref 0–0.61)
EOSINOPHIL NFR BLD AUTO: 0 % (ref 0–6)
ERYTHROCYTE [DISTWIDTH] IN BLOOD BY AUTOMATED COUNT: 12.1 % (ref 11.6–15.1)
ERYTHROCYTE [SEDIMENTATION RATE] IN BLOOD: 6 MM/HOUR (ref 0–10)
HCT VFR BLD AUTO: 48.1 % (ref 36.5–49.3)
HGB BLD-MCNC: 15.3 G/DL (ref 12–17)
IMM GRANULOCYTES # BLD AUTO: 0.01 THOUSAND/UL (ref 0–0.2)
IMM GRANULOCYTES NFR BLD AUTO: 0 % (ref 0–2)
LYMPHOCYTES # BLD AUTO: 3.33 THOUSANDS/ΜL (ref 0.6–4.47)
LYMPHOCYTES NFR BLD AUTO: 41 % (ref 14–44)
MCH RBC QN AUTO: 29.3 PG (ref 26.8–34.3)
MCHC RBC AUTO-ENTMCNC: 31.8 G/DL (ref 31.4–37.4)
MCV RBC AUTO: 92 FL (ref 82–98)
MONOCYTES # BLD AUTO: 0.47 THOUSAND/ΜL (ref 0.17–1.22)
MONOCYTES NFR BLD AUTO: 6 % (ref 4–12)
NEUTROPHILS # BLD AUTO: 4.16 THOUSANDS/ΜL (ref 1.85–7.62)
NEUTS SEG NFR BLD AUTO: 52 % (ref 43–75)
NRBC BLD AUTO-RTO: 0 /100 WBCS
PLATELET # BLD AUTO: 361 THOUSANDS/UL (ref 149–390)
PMV BLD AUTO: 10.5 FL (ref 8.9–12.7)
RBC # BLD AUTO: 5.22 MILLION/UL (ref 3.88–5.62)
TSH SERPL DL<=0.05 MIU/L-ACNC: 1.73 UIU/ML (ref 0.36–3.74)
VIT B12 SERPL-MCNC: 656 PG/ML (ref 100–900)
WBC # BLD AUTO: 8.04 THOUSAND/UL (ref 4.31–10.16)

## 2019-01-16 PROCEDURE — 72110 X-RAY EXAM L-2 SPINE 4/>VWS: CPT

## 2019-01-16 PROCEDURE — 86140 C-REACTIVE PROTEIN: CPT

## 2019-01-16 PROCEDURE — 86430 RHEUMATOID FACTOR TEST QUAL: CPT

## 2019-01-16 PROCEDURE — 82607 VITAMIN B-12: CPT

## 2019-01-16 PROCEDURE — 84443 ASSAY THYROID STIM HORMONE: CPT

## 2019-01-16 PROCEDURE — 82550 ASSAY OF CK (CPK): CPT | Performed by: FAMILY MEDICINE

## 2019-01-16 PROCEDURE — 85025 COMPLETE CBC W/AUTO DIFF WBC: CPT

## 2019-01-16 PROCEDURE — 86618 LYME DISEASE ANTIBODY: CPT

## 2019-01-16 PROCEDURE — 36415 COLL VENOUS BLD VENIPUNCTURE: CPT

## 2019-01-16 PROCEDURE — 86038 ANTINUCLEAR ANTIBODIES: CPT

## 2019-01-16 PROCEDURE — 85652 RBC SED RATE AUTOMATED: CPT

## 2019-01-17 LAB
B BURGDOR IGG SER IA-ACNC: 0.12
B BURGDOR IGM SER IA-ACNC: 0.4
RHEUMATOID FACT SER QL LA: NEGATIVE

## 2019-01-18 ENCOUNTER — TELEPHONE (OUTPATIENT)
Dept: FAMILY MEDICINE CLINIC | Facility: CLINIC | Age: 29
End: 2019-01-18

## 2019-01-18 LAB — RYE IGE QN: NEGATIVE

## 2019-01-23 ENCOUNTER — TELEPHONE (OUTPATIENT)
Dept: FAMILY MEDICINE CLINIC | Facility: CLINIC | Age: 29
End: 2019-01-23

## 2019-01-23 NOTE — TELEPHONE ENCOUNTER
Tried to reach out to the patient to reschedule his appointment - No Show- 1/23/19  Line was busy, will need to try again

## 2019-01-25 ENCOUNTER — OFFICE VISIT (OUTPATIENT)
Dept: FAMILY MEDICINE CLINIC | Facility: CLINIC | Age: 29
End: 2019-01-25
Payer: COMMERCIAL

## 2019-01-25 VITALS
HEIGHT: 71 IN | WEIGHT: 247 LBS | RESPIRATION RATE: 18 BRPM | TEMPERATURE: 97.2 F | HEART RATE: 88 BPM | BODY MASS INDEX: 34.58 KG/M2 | DIASTOLIC BLOOD PRESSURE: 86 MMHG | SYSTOLIC BLOOD PRESSURE: 120 MMHG

## 2019-01-25 DIAGNOSIS — G89.29 CHRONIC LEFT-SIDED THORACIC BACK PAIN: Primary | ICD-10-CM

## 2019-01-25 DIAGNOSIS — M54.6 CHRONIC LEFT-SIDED THORACIC BACK PAIN: Primary | ICD-10-CM

## 2019-01-25 DIAGNOSIS — R06.83 SNORING: ICD-10-CM

## 2019-01-25 DIAGNOSIS — G47.10 EXCESSIVE SLEEPINESS: ICD-10-CM

## 2019-01-25 PROCEDURE — 99213 OFFICE O/P EST LOW 20 MIN: CPT | Performed by: NURSE PRACTITIONER

## 2019-01-25 PROCEDURE — 1036F TOBACCO NON-USER: CPT | Performed by: NURSE PRACTITIONER

## 2019-01-25 PROCEDURE — 3008F BODY MASS INDEX DOCD: CPT | Performed by: NURSE PRACTITIONER

## 2019-01-25 RX ORDER — MELOXICAM 15 MG/1
15 TABLET ORAL DAILY
Qty: 30 TABLET | Refills: 0 | Status: SHIPPED | OUTPATIENT
Start: 2019-01-25 | End: 2021-05-21

## 2019-01-25 RX ORDER — GABAPENTIN 100 MG/1
100 CAPSULE ORAL
Qty: 30 CAPSULE | Refills: 0 | Status: SHIPPED | OUTPATIENT
Start: 2019-01-25 | End: 2021-05-21

## 2019-01-25 NOTE — PROGRESS NOTES
Chief Complaint   Patient presents with    Leg Pain     Patient present today for bilateral leg pain going up to his lower back for the last 2-3 weeks  Per patient he is also having sleeping issues, he wakes up every 15-20 min  Assessment/Plan:     Diagnoses and all orders for this visit:    Chronic left-sided thoracic back pain  -     meloxicam (MOBIC) 15 mg tablet; Take 1 tablet (15 mg total) by mouth daily  -     XR spine thoracic 3 vw; Future  -     gabapentin (NEURONTIN) 100 mg capsule; Take 1 capsule (100 mg total) by mouth daily at bedtime    Excessive sleepiness  -     Diagnostic Sleep Study; Future    Snoring  -     Diagnostic Sleep Study; Future          Subjective:      Patient ID: Tulio Valencia is a 29 y o  male    Also he states that his wife tells him that he snoring a lot; wakes up choking, wife states she see him stop breathing for a few seconds  Patient states he wakes up tired  Patient states he has lower back pain for 2 years and was treated at the [place] but no longer there but patient states he feels like there is electricity running through his leg all the time and he states he can't sleep  Left greater than right leg  Patient was on tramadol before and was taken off was placed on Cymbalta states that that didn't help  He needed to make appointment to see them to continue medication but does not want to go there anymore  Patient states 2 months ago they stopped trazodone, wellbutrin and ramipril prescriptions due to not going in for [place] visits  Patient states he repeated that he has low back pain but he points to his throacic area  He states that was the [place] said it was so he has just used the same works but his pain has always been higher  Leg Pain    The incident occurred more than 1 week ago  There was no injury mechanism  The pain is present in the right thigh, right foot, left thigh, left knee, left hip, right hip, right knee and left foot   The quality of the pain is described as cramping and burning  The pain is at a severity of 6/10  The pain is moderate  The pain has been constant since onset  Associated symptoms include numbness and tingling  Pertinent negatives include no inability to bear weight, loss of motion, loss of sensation or muscle weakness  The symptoms are aggravated by movement  He has tried NSAIDs (he states he doing home exercise  ) for the symptoms  The treatment provided mild relief  The following portions of the patient's history were reviewed and updated as appropriate: allergies, current medications, past family history, past medical history, past social history, past surgical history and problem list     Review of Systems   Constitutional: Positive for activity change  Negative for fatigue and fever  HENT: Negative  Respiratory: Negative for chest tightness and shortness of breath  Cardiovascular: Negative for chest pain and palpitations  Gastrointestinal: Negative for abdominal pain and constipation  Genitourinary: Negative for difficulty urinating  Musculoskeletal: Positive for back pain and gait problem  Negative for joint swelling and myalgias  Neurological: Positive for tingling and numbness  Objective:      /86 (BP Location: Left arm, Patient Position: Sitting, Cuff Size: Standard)   Pulse 88   Temp (!) 97 2 °F (36 2 °C) (Temporal)   Resp 18   Ht 5' 11" (1 803 m)   Wt 112 kg (247 lb)   BMI 34 45 kg/m²          Physical Exam   Constitutional: He is oriented to person, place, and time  Vital signs are normal  He appears well-developed and well-nourished  He does not appear ill  HENT:   Head: Normocephalic and atraumatic  Eyes: Pupils are equal    Neck: No JVD present  Cardiovascular: Normal rate, regular rhythm, S1 normal and S2 normal     No murmur heard  Pulmonary/Chest: Effort normal and breath sounds normal  No respiratory distress     Musculoskeletal:        Thoracic back: He exhibits decreased range of motion, tenderness and bony tenderness  He exhibits no swelling, no edema, no laceration, no pain and no spasm  Neurological: He is alert and oriented to person, place, and time  Reflex Scores:       Patellar reflexes are 2+ on the right side and 2+ on the left side  Psychiatric: He has a normal mood and affect  Thought content normal        Patient was asked to complete epworth sleepiness scale and tested at 20

## 2019-02-04 ENCOUNTER — TELEPHONE (OUTPATIENT)
Dept: SLEEP CENTER | Facility: CLINIC | Age: 29
End: 2019-02-04

## 2019-02-04 NOTE — TELEPHONE ENCOUNTER
----- Message from Licha Garsia MD sent at 1/29/2019 10:42 PM EST -----  approved  ----- Message -----  From: Karan Solorzano: 1/28/2019  11:53 AM  To: Sleep Medicine [facility] [place], #    PLEASE REVIEW FOR APPROVAL OR DENIAL AND WHY

## 2019-02-13 ENCOUNTER — HOSPITAL ENCOUNTER (OUTPATIENT)
Dept: RADIOLOGY | Facility: HOSPITAL | Age: 29
Discharge: HOME | End: 2019-02-13
Payer: COMMERCIAL

## 2019-02-13 DIAGNOSIS — G89.29 CHRONIC LEFT-SIDED THORACIC BACK PAIN: ICD-10-CM

## 2019-02-13 DIAGNOSIS — M54.6 CHRONIC LEFT-SIDED THORACIC BACK PAIN: ICD-10-CM

## 2019-02-13 PROCEDURE — 72072 X-RAY EXAM THORAC SPINE 3VWS: CPT

## 2019-02-18 ENCOUNTER — TELEPHONE (OUTPATIENT)
Dept: FAMILY MEDICINE CLINIC | Facility: CLINIC | Age: 29
End: 2019-02-18

## 2019-03-13 ENCOUNTER — TELEPHONE (OUTPATIENT)
Dept: FAMILY MEDICINE CLINIC | Facility: CLINIC | Age: 29
End: 2019-03-13

## 2019-03-13 DIAGNOSIS — G89.29 CHRONIC BILATERAL LOW BACK PAIN WITHOUT SCIATICA: ICD-10-CM

## 2019-03-13 DIAGNOSIS — M54.50 CHRONIC BILATERAL LOW BACK PAIN WITHOUT SCIATICA: ICD-10-CM

## 2019-03-13 DIAGNOSIS — Z11.1 ENCOUNTER FOR PPD SKIN TEST READING: Primary | ICD-10-CM

## 2019-05-20 ENCOUNTER — HOSPITAL ENCOUNTER (OUTPATIENT)
Dept: SLEEP CENTER | Facility: CLINIC | Age: 29
Discharge: HOME | End: 2019-05-20
Payer: COMMERCIAL

## 2019-05-20 DIAGNOSIS — R06.83 SNORING: ICD-10-CM

## 2019-05-20 DIAGNOSIS — G47.10 EXCESSIVE SLEEPINESS: ICD-10-CM

## 2019-05-20 PROCEDURE — 95810 POLYSOM 6/> YRS 4/> PARAM: CPT

## 2019-05-24 ENCOUNTER — TELEPHONE (OUTPATIENT)
Dept: FAMILY MEDICINE CLINIC | Facility: CLINIC | Age: 29
End: 2019-05-24

## 2019-05-24 ENCOUNTER — TELEPHONE (OUTPATIENT)
Dept: SLEEP CENTER | Facility: CLINIC | Age: 29
End: 2019-05-24

## 2020-07-29 ENCOUNTER — TELEPHONE (OUTPATIENT)
Dept: NEUROLOGY | Facility: CLINIC | Age: 30
End: 2020-07-29

## 2020-08-07 ENCOUNTER — TRANSCRIBE ORDERS (OUTPATIENT)
Dept: NEUROLOGY | Facility: CLINIC | Age: 30
End: 2020-08-07

## 2020-08-07 ENCOUNTER — TELEPHONE (OUTPATIENT)
Dept: NEUROLOGY | Facility: CLINIC | Age: 30
End: 2020-08-07

## 2020-08-07 DIAGNOSIS — R20.2 TINGLING: Primary | ICD-10-CM

## 2020-10-05 ENCOUNTER — APPOINTMENT (EMERGENCY)
Dept: RADIOLOGY | Facility: HOSPITAL | Age: 30
End: 2020-10-05
Payer: COMMERCIAL

## 2020-10-05 ENCOUNTER — HOSPITAL ENCOUNTER (EMERGENCY)
Facility: HOSPITAL | Age: 30
Discharge: HOME | End: 2020-10-05
Attending: EMERGENCY MEDICINE | Admitting: EMERGENCY MEDICINE
Payer: COMMERCIAL

## 2020-10-05 VITALS
RESPIRATION RATE: 18 BRPM | OXYGEN SATURATION: 96 % | HEART RATE: 105 BPM | DIASTOLIC BLOOD PRESSURE: 82 MMHG | SYSTOLIC BLOOD PRESSURE: 154 MMHG | TEMPERATURE: 97 F

## 2020-10-05 DIAGNOSIS — S92.216A: ICD-10-CM

## 2020-10-05 DIAGNOSIS — S92.234A CLOSED NONDISPLACED FRACTURE OF INTERMEDIATE CUNEIFORM OF RIGHT FOOT, INITIAL ENCOUNTER: Primary | ICD-10-CM

## 2020-10-05 DIAGNOSIS — S92.354A CLOSED NONDISPLACED FRACTURE OF FIFTH METATARSAL BONE OF RIGHT FOOT, INITIAL ENCOUNTER: ICD-10-CM

## 2020-10-05 PROCEDURE — 73630 X-RAY EXAM OF FOOT: CPT

## 2020-10-05 PROCEDURE — 99283 EMERGENCY DEPT VISIT LOW MDM: CPT

## 2020-10-05 PROCEDURE — 29515 APPLICATION SHORT LEG SPLINT: CPT | Performed by: EMERGENCY MEDICINE

## 2020-10-05 PROCEDURE — 96372 THER/PROPH/DIAG INJ SC/IM: CPT

## 2020-10-05 PROCEDURE — 73610 X-RAY EXAM OF ANKLE: CPT

## 2020-10-05 PROCEDURE — 99284 EMERGENCY DEPT VISIT MOD MDM: CPT | Performed by: EMERGENCY MEDICINE

## 2020-10-05 RX ORDER — TRAMADOL HYDROCHLORIDE 50 MG/1
50 TABLET ORAL EVERY 6 HOURS PRN
Qty: 8 TABLET | Refills: 0 | Status: SHIPPED | OUTPATIENT
Start: 2020-10-05 | End: 2020-10-07

## 2020-10-05 RX ORDER — KETOROLAC TROMETHAMINE 30 MG/ML
15 INJECTION, SOLUTION INTRAMUSCULAR; INTRAVENOUS ONCE
Status: COMPLETED | OUTPATIENT
Start: 2020-10-05 | End: 2020-10-05

## 2020-10-05 RX ADMIN — KETOROLAC TROMETHAMINE 15 MG: 30 INJECTION, SOLUTION INTRAMUSCULAR at 15:11

## 2021-04-05 ENCOUNTER — APPOINTMENT (EMERGENCY)
Dept: RADIOLOGY | Facility: HOSPITAL | Age: 31
End: 2021-04-05
Payer: COMMERCIAL

## 2021-04-05 ENCOUNTER — HOSPITAL ENCOUNTER (EMERGENCY)
Facility: HOSPITAL | Age: 31
Discharge: HOME | End: 2021-04-05
Attending: EMERGENCY MEDICINE
Payer: COMMERCIAL

## 2021-04-05 VITALS
DIASTOLIC BLOOD PRESSURE: 82 MMHG | BODY MASS INDEX: 37.91 KG/M2 | HEART RATE: 79 BPM | TEMPERATURE: 98.6 F | SYSTOLIC BLOOD PRESSURE: 131 MMHG | WEIGHT: 271.83 LBS | RESPIRATION RATE: 16 BRPM | OXYGEN SATURATION: 99 %

## 2021-04-05 DIAGNOSIS — R53.83 FATIGUE: Primary | ICD-10-CM

## 2021-04-05 DIAGNOSIS — R51.9 HEADACHE: ICD-10-CM

## 2021-04-05 DIAGNOSIS — R07.89 ATYPICAL CHEST PAIN: ICD-10-CM

## 2021-04-05 LAB
ALBUMIN SERPL BCP-MCNC: 3.7 G/DL (ref 3.5–5)
ALP SERPL-CCNC: 113 U/L (ref 46–116)
ALT SERPL W P-5'-P-CCNC: 46 U/L (ref 12–78)
ANION GAP SERPL CALCULATED.3IONS-SCNC: 9 MMOL/L (ref 4–13)
AST SERPL W P-5'-P-CCNC: 20 U/L (ref 5–45)
ATRIAL RATE: 67 BPM
BASOPHILS # BLD AUTO: 0.04 THOUSANDS/ΜL (ref 0–0.1)
BASOPHILS NFR BLD AUTO: 1 % (ref 0–1)
BILIRUB SERPL-MCNC: 0.39 MG/DL (ref 0.2–1)
BUN SERPL-MCNC: 11 MG/DL (ref 5–25)
CALCIUM SERPL-MCNC: 9.3 MG/DL (ref 8.3–10.1)
CHLORIDE SERPL-SCNC: 103 MMOL/L (ref 100–108)
CO2 SERPL-SCNC: 28 MMOL/L (ref 21–32)
CREAT SERPL-MCNC: 1.06 MG/DL (ref 0.6–1.3)
EOSINOPHIL # BLD AUTO: 0.01 THOUSAND/ΜL (ref 0–0.61)
EOSINOPHIL NFR BLD AUTO: 0 % (ref 0–6)
ERYTHROCYTE [DISTWIDTH] IN BLOOD BY AUTOMATED COUNT: 12.8 % (ref 11.6–15.1)
FLUAV RNA RESP QL NAA+PROBE: NEGATIVE
FLUBV RNA RESP QL NAA+PROBE: NEGATIVE
GFR SERPL CREATININE-BSD FRML MDRD: 94 ML/MIN/1.73SQ M
GLUCOSE SERPL-MCNC: 92 MG/DL (ref 65–140)
HCT VFR BLD AUTO: 45.9 % (ref 36.5–49.3)
HGB BLD-MCNC: 15.2 G/DL (ref 12–17)
IMM GRANULOCYTES # BLD AUTO: 0.02 THOUSAND/UL (ref 0–0.2)
IMM GRANULOCYTES NFR BLD AUTO: 0 % (ref 0–2)
LIPASE SERPL-CCNC: 114 U/L (ref 73–393)
LYMPHOCYTES # BLD AUTO: 1.9 THOUSANDS/ΜL (ref 0.6–4.47)
LYMPHOCYTES NFR BLD AUTO: 23 % (ref 14–44)
MCH RBC QN AUTO: 29.9 PG (ref 26.8–34.3)
MCHC RBC AUTO-ENTMCNC: 33.1 G/DL (ref 31.4–37.4)
MCV RBC AUTO: 90 FL (ref 82–98)
MONOCYTES # BLD AUTO: 0.53 THOUSAND/ΜL (ref 0.17–1.22)
MONOCYTES NFR BLD AUTO: 6 % (ref 4–12)
NEUTROPHILS # BLD AUTO: 5.88 THOUSANDS/ΜL (ref 1.85–7.62)
NEUTS SEG NFR BLD AUTO: 70 % (ref 43–75)
NRBC BLD AUTO-RTO: 0 /100 WBCS
NT-PROBNP SERPL-MCNC: 16 PG/ML
P AXIS: 33 DEGREES
PLATELET # BLD AUTO: 302 THOUSANDS/UL (ref 149–390)
PMV BLD AUTO: 10.1 FL (ref 8.9–12.7)
POTASSIUM SERPL-SCNC: 3.9 MMOL/L (ref 3.5–5.3)
PR INTERVAL: 166 MS
PROT SERPL-MCNC: 7.8 G/DL (ref 6.4–8.2)
QRS AXIS: 62 DEGREES
QRSD INTERVAL: 70 MS
QT INTERVAL: 376 MS
QTC INTERVAL: 397 MS
RBC # BLD AUTO: 5.08 MILLION/UL (ref 3.88–5.62)
RSV RNA RESP QL NAA+PROBE: NEGATIVE
SARS-COV-2 RNA RESP QL NAA+PROBE: NEGATIVE
SODIUM SERPL-SCNC: 140 MMOL/L (ref 136–145)
T WAVE AXIS: 4 DEGREES
TROPONIN I SERPL-MCNC: <0.02 NG/ML
TSH SERPL DL<=0.05 MIU/L-ACNC: 0.91 UIU/ML (ref 0.36–3.74)
VENTRICULAR RATE: 67 BPM
WBC # BLD AUTO: 8.38 THOUSAND/UL (ref 4.31–10.16)

## 2021-04-05 PROCEDURE — 99285 EMERGENCY DEPT VISIT HI MDM: CPT | Performed by: PHYSICIAN ASSISTANT

## 2021-04-05 PROCEDURE — 83880 ASSAY OF NATRIURETIC PEPTIDE: CPT | Performed by: PHYSICIAN ASSISTANT

## 2021-04-05 PROCEDURE — 83690 ASSAY OF LIPASE: CPT | Performed by: PHYSICIAN ASSISTANT

## 2021-04-05 PROCEDURE — 80053 COMPREHEN METABOLIC PANEL: CPT | Performed by: PHYSICIAN ASSISTANT

## 2021-04-05 PROCEDURE — 99284 EMERGENCY DEPT VISIT MOD MDM: CPT

## 2021-04-05 PROCEDURE — 84443 ASSAY THYROID STIM HORMONE: CPT | Performed by: PHYSICIAN ASSISTANT

## 2021-04-05 PROCEDURE — 0241U HB NFCT DS VIR RESP RNA 4 TRGT: CPT | Performed by: PHYSICIAN ASSISTANT

## 2021-04-05 PROCEDURE — 36415 COLL VENOUS BLD VENIPUNCTURE: CPT | Performed by: PHYSICIAN ASSISTANT

## 2021-04-05 PROCEDURE — 93010 ELECTROCARDIOGRAM REPORT: CPT | Performed by: INTERNAL MEDICINE

## 2021-04-05 PROCEDURE — 85025 COMPLETE CBC W/AUTO DIFF WBC: CPT | Performed by: PHYSICIAN ASSISTANT

## 2021-04-05 PROCEDURE — 84484 ASSAY OF TROPONIN QUANT: CPT | Performed by: PHYSICIAN ASSISTANT

## 2021-04-05 PROCEDURE — 93005 ELECTROCARDIOGRAM TRACING: CPT

## 2021-04-05 PROCEDURE — 71045 X-RAY EXAM CHEST 1 VIEW: CPT

## 2021-04-05 RX ORDER — ACETAMINOPHEN 500 MG
500 TABLET ORAL EVERY 6 HOURS PRN
Qty: 30 TABLET | Refills: 0 | Status: SHIPPED | OUTPATIENT
Start: 2021-04-05

## 2021-04-05 RX ORDER — ACETAMINOPHEN 325 MG/1
650 TABLET ORAL ONCE
Status: COMPLETED | OUTPATIENT
Start: 2021-04-05 | End: 2021-04-05

## 2021-04-05 RX ADMIN — ACETAMINOPHEN 650 MG: 325 TABLET, FILM COATED ORAL at 12:19

## 2021-04-05 NOTE — ED PROVIDER NOTES
History  Chief Complaint   Patient presents with    Fatigue     reports feels fatigued, swelling to feet and ankles, HA  sx for 1 month  Patient is a 80-year-old male with a past medical history of hypertension, chronic low back pain who presents with multiple complaints  Patient reports intermittent gradual onset, mild aching diffuse headache for 4 weeks, which has worsened over the last couple days  He denies any associated vision changes, dizziness, lightheadedness, neck pain or stiffness, numbness, tingling, weakness  He also reports feeling fatigued, primarily with activity  He states he tires quickly any time he attempts to do activity  He denies any specifically associated chest pain, shortness of breath, palpitations, leg pain or swelling, DAVIES, recent travel, known sick contacts or COVID-19 exposure  Patient does note intermittent chest pain, unrelated shortness of breath and intermittent lower leg swelling, with no known aggravating or alleviating factors  He states none of these symptoms occur at the same time or seem to exacerbate one another  Patient denies any leg swelling at this time  He states his low back pain is unchanged and denies any numbness, tingling, weakness of the legs, saddle anesthesia, loss of bowel or bladder function, urinary changes  Patient has not tried anything to help alleviate his symptoms  Patient is not currently taking any daily medication and has not followed up with his PCP  Patient states he is otherwise in his usual state of health and denies any fevers, chills, diaphoresis, congestion, cough, abdominal pain, nausea, vomiting, diarrhea, rash  Patient denies any tobacco, alcohol, illicit drug use  Prior to Admission Medications   Prescriptions Last Dose Informant Patient Reported? Taking?    RaNITidine HCl (RANITIDINE 75 PO)   Yes No   Sig: Take by mouth   gabapentin (NEURONTIN) 100 mg capsule   No No   Sig: Take 1 capsule (100 mg total) by mouth daily at bedtime   meloxicam (MOBIC) 15 mg tablet   No No   Sig: Take 1 tablet (15 mg total) by mouth daily   naproxen (NAPROSYN) 500 mg tablet   No No   Sig: Take 1 tablet (500 mg total) by mouth 2 (two) times a day with meals for 10 days   omeprazole (PriLOSEC) 10 mg delayed release capsule   Yes No   Sig: Take 10 mg by mouth daily   ramipril (ALTACE) 2 5 mg capsule   Yes No   Sig: Take 2 5 mg by mouth 2 (two) times a day     traZODone (DESYREL) 150 mg tablet   Yes No   Sig: Take 150 mg by mouth daily at bedtime      Facility-Administered Medications: None       Past Medical History:   Diagnosis Date    Chronic pain     GERD (gastroesophageal reflux disease)     Hypertension     Manic depressive disorder (HCC)        Past Surgical History:   Procedure Laterality Date    NO PAST SURGERIES         Family History   Problem Relation Age of Onset    Hypertension Mother     Diabetes Father      I have reviewed and agree with the history as documented  E-Cigarette/Vaping     E-Cigarette/Vaping Substances     Social History     Tobacco Use    Smoking status: Never Smoker    Smokeless tobacco: Never Used   Substance Use Topics    Alcohol use: No    Drug use: No       Review of Systems   Constitutional: Positive for fatigue  Negative for chills, diaphoresis and fever  HENT: Negative for congestion, ear pain, rhinorrhea and sore throat  Eyes: Negative for visual disturbance  Respiratory: Positive for shortness of breath  Negative for cough, wheezing and stridor  Cardiovascular: Positive for chest pain and leg swelling  Negative for palpitations  Gastrointestinal: Negative for abdominal pain, diarrhea, nausea and vomiting  Genitourinary: Negative for difficulty urinating, dysuria, frequency, hematuria and urgency  Musculoskeletal: Negative for myalgias, neck pain and neck stiffness  Skin: Negative for color change, pallor and rash  Neurological: Positive for headaches   Negative for dizziness, weakness, light-headedness and numbness  All other systems reviewed and are negative  Physical Exam  Physical Exam  Vitals signs and nursing note reviewed  Constitutional:       General: He is awake  He is not in acute distress  Appearance: He is well-developed  He is not ill-appearing, toxic-appearing or diaphoretic  HENT:      Head: Normocephalic and atraumatic  Right Ear: Hearing, tympanic membrane, ear canal and external ear normal       Left Ear: Hearing, tympanic membrane, ear canal and external ear normal       Nose: Nose normal    Eyes:      Extraocular Movements: Extraocular movements intact  Conjunctiva/sclera: Conjunctivae normal       Pupils: Pupils are equal, round, and reactive to light  Neck:      Musculoskeletal: Normal range of motion and neck supple  Cardiovascular:      Rate and Rhythm: Normal rate and regular rhythm  Pulses: Normal pulses  Heart sounds: Normal heart sounds, S1 normal and S2 normal    Pulmonary:      Effort: Pulmonary effort is normal  No respiratory distress  Breath sounds: Normal breath sounds  No stridor  No decreased breath sounds or wheezing  Abdominal:      General: Bowel sounds are normal  There is no distension  Palpations: Abdomen is soft  Tenderness: There is no abdominal tenderness  Musculoskeletal: Normal range of motion  Right lower leg: No edema  Left lower leg: No edema  Comments: Neurovascularly intact, 5/5 strength in bilateral upper and lower extremities   Skin:     General: Skin is warm and dry  Capillary Refill: Capillary refill takes less than 2 seconds  Neurological:      General: No focal deficit present  Mental Status: He is alert and oriented to person, place, and time  GCS: GCS eye subscore is 4  GCS verbal subscore is 5  GCS motor subscore is 6  Psychiatric:         Behavior: Behavior is cooperative           Vital Signs  ED Triage Vitals   Temperature Pulse Respirations Blood Pressure SpO2   04/05/21 1131 04/05/21 1131 04/05/21 1131 04/05/21 1131 04/05/21 1131   98 6 °F (37 °C) 84 14 (!) 173/77 96 %      Temp Source Heart Rate Source Patient Position - Orthostatic VS BP Location FiO2 (%)   04/05/21 1131 04/05/21 1336 04/05/21 1131 04/05/21 1131 --   Oral Monitor Sitting Right arm       Pain Score       04/05/21 1131       6           Vitals:    04/05/21 1131 04/05/21 1336   BP: (!) 173/77 131/82   Pulse: 84 79   Patient Position - Orthostatic VS: Sitting          Visual Acuity      ED Medications  Medications   acetaminophen (TYLENOL) tablet 650 mg (650 mg Oral Given 4/5/21 1219)       Diagnostic Studies  Results Reviewed     Procedure Component Value Units Date/Time    COVID19, Influenza A/B, RSV PCR, [facility] [[id number]]  (Normal) Collected: 04/05/21 1218    Lab Status: Final result Specimen: Nares from Nasopharyngeal Swab Updated: 04/05/21 1319     SARS-CoV-2 Negative     INFLUENZA A PCR Negative     INFLUENZA B PCR Negative     RSV PCR Negative    Narrative: This test has been authorized by FDA under an EUA (Emergency Use Assay) for use by authorized laboratories  Clinical caution and judgement should be used with the interpretation of these results with consideration of the clinical impression and other laboratory testing  Testing reported as "Positive" or "Negative" has been proven to be accurate according to standard laboratory validation requirements  All testing is performed with control materials showing appropriate reactivity at standard intervals  TSH [[id number]]  (Normal) Collected: 04/05/21 1218    Lab Status: Final result Specimen: Blood from Arm, Right Updated: 04/05/21 1312     TSH 3RD GENERATON 0 907 uIU/mL     Narrative:      Patients undergoing fluorescein dye angiography may retain small amounts of fluorescein in the body for 48-72 hours post procedure  Samples containing fluorescein can produce falsely depressed TSH values   If the patient had this procedure,a specimen should be resubmitted post fluorescein clearance        Lipase [[id number]]  (Normal) Collected: 04/05/21 1218    Lab Status: Final result Specimen: Blood from Arm, Right Updated: 04/05/21 1312     Lipase 114 u/L     NT-BNP PRO [[id number]]  (Normal) Collected: 04/05/21 1218    Lab Status: Final result Specimen: Blood from Arm, Right Updated: 04/05/21 1312     NT-proBNP 16 pg/mL     Comprehensive metabolic panel [[id number]] Collected: 04/05/21 1218    Lab Status: Final result Specimen: Blood from Arm, Right Updated: 04/05/21 1300     Sodium 140 mmol/L      Potassium 3 9 mmol/L      Chloride 103 mmol/L      CO2 28 mmol/L      ANION GAP 9 mmol/L      BUN 11 mg/dL      Creatinine 1 06 mg/dL      Glucose 92 mg/dL      Calcium 9 3 mg/dL      AST 20 U/L      ALT 46 U/L      Alkaline Phosphatase 113 U/L      Total Protein 7 8 g/dL      Albumin 3 7 g/dL      Total Bilirubin 0 39 mg/dL      eGFR 94 ml/min/1 73sq m     Narrative:      [facility] guidelines for Chronic Kidney Disease (CKD):     Stage 1 with normal or high GFR (GFR > 90 mL/min/1 73 square meters)    Stage 2 Mild CKD (GFR = 60-89 mL/min/1 73 square meters)    Stage 3A Moderate CKD (GFR = 45-59 mL/min/1 73 square meters)    Stage 3B Moderate CKD (GFR = 30-44 mL/min/1 73 square meters)    Stage 4 Severe CKD (GFR = 15-29 mL/min/1 73 square meters)    Stage 5 End Stage CKD (GFR <15 mL/min/1 73 square meters)  Note: GFR calculation is accurate only with a steady state creatinine    Troponin I [[id number]]  (Normal) Collected: 04/05/21 1218    Lab Status: Final result Specimen: Blood from Arm, Right Updated: 04/05/21 1258     Troponin I <0 02 ng/mL     CBC and differential [[id number]] Collected: 04/05/21 1218    Lab Status: Final result Specimen: Blood from Arm, Right Updated: 04/05/21 1226     WBC 8 38 Thousand/uL      RBC 5 08 Million/uL      Hemoglobin 15 2 g/dL      Hematocrit 45 9 %      MCV 90 fL      MCH 29 9 pg      MCHC 33 1 g/dL      RDW 12 8 %      MPV 10 1 fL      Platelets 229 Thousands/uL      nRBC 0 /100 WBCs      Neutrophils Relative 70 %      Immat GRANS % 0 %      Lymphocytes Relative 23 %      Monocytes Relative 6 %      Eosinophils Relative 0 %      Basophils Relative 1 %      Neutrophils Absolute 5 88 Thousands/µL      Immature Grans Absolute 0 02 Thousand/uL      Lymphocytes Absolute 1 90 Thousands/µL      Monocytes Absolute 0 53 Thousand/µL      Eosinophils Absolute 0 01 Thousand/µL      Basophils Absolute 0 04 Thousands/µL                  XR chest 1 view portable   ED Interpretation by Shreya Martines PA-C (04/05 1907)   No acute pathology      Final Result by Nellie Hollingsworth MD (04/05 1330)      No acute cardiopulmonary disease  Workstation performed: [id number]                    Procedures  ECG 12 Lead Documentation Only    Date/Time: 4/5/2021 12:16 PM  Performed by: Shreya Martines PA-C  Authorized by: Shreya Martines PA-C     Indications / Diagnosis:  Chest pain  ECG reviewed by me, the ED Provider: yes    Patient location:  ED  Previous ECG:     Previous ECG:  Compared to current    Comparison ECG info:  [id number]  Rate:     ECG rate:  67    ECG rate assessment: normal    Rhythm:     Rhythm: sinus rhythm    Ectopy:     Ectopy: none    QRS:     QRS axis:  Normal  Conduction:     Conduction: normal    ST segments:     ST segments:  Normal  T waves:     T waves: inverted      Inverted:  III (present on prior)             ED Course  ED Course as of Apr 05 1607   Mon Apr 05, 2021   1259 Troponin I: <0 02   1301 WNL   CBC and differential   1301 WNL   Comprehensive metabolic panel   5557 Ambulatory   SpO2(S): 97 %   1321 SARS-COV-2: Negative   1321 INFLU A PCR: Negative   1321 INFLU B PCR: Negative   1321 RSV PCR: Negative   1343 Blood Pressure: 131/82   1400 Patient notes resolution of his symptoms and states he is feeling much better    Reviewed with patient, answered questions  Patient is stable for discharge  HEART Risk Score      Most Recent Value   Heart Score Risk Calculator   History  0 Filed at: 04/05/2021 1259   ECG  0 Filed at: 04/05/2021 1259   Age  0 Filed at: 04/05/2021 1259   Risk Factors  1 Filed at: 04/05/2021 1259   Troponin  0 Filed at: 04/05/2021 1259   HEART Score  1 Filed at: 04/05/2021 1259                      SBIRT 20yo+      Most Recent Value   SBIRT (23 yo +)   In order to provide better care to our patients, we are screening all of our patients for alcohol and drug use  Would it be okay to ask you these screening questions? No Filed at: 04/05/2021 1137                    MDM  Number of Diagnoses or Management Options  Atypical chest pain:   Fatigue:   Headache:   Diagnosis management comments: Reviewed all results with patient, answered questions  Patient notes resolution of symptoms and states he is feeling much better  Reviewed medication Education, treatment at home  Recommended follow-up with PCP for further evaluation of symptoms  The management plan was discussed in detail with the patient at bedside and all questions were answered  Provided both verbal and written instructions  Reviewed red flag symptoms and strict return to ED instructions  Patient notes understanding and agrees to plan  Disposition  Final diagnoses:   Fatigue   Atypical chest pain   Headache     Time reflects when diagnosis was documented in both MDM as applicable and the Disposition within this note     Time User Action Codes Description Comment    4/5/2021  2:00 PM Surekha Smolder Add [R53 83] Fatigue     4/5/2021  2:00 PM Surekha Smolder Add [R07 89] Atypical chest pain     4/5/2021  2:00 PM Surekha Smolder Add [R51 9] Headache       ED Disposition     ED Disposition Condition Date/Time Comment    Discharge Stable Mon Apr 5, 2021  2:00 PM [facility] discharge to home/self care              Follow-up Information     Follow up With Specialties Details Why Contact Info Additional [address] [facility], [address] [facility], Nurse Practitioner In 3 days  [facility] [address]  [phone] [address] [facility] Emergency Medicine  If symptoms worsen [place] [id number] [address] [facility] Emergency Department, [address]  , [place], [place], [address]          Discharge Medication List as of 4/5/2021  2:02 PM      START taking these medications    Details   acetaminophen (TYLENOL) 500 mg tablet Take 1 tablet (500 mg total) by mouth every 6 (six) hours as needed for mild pain, moderate pain or headaches, Starting Mon 4/5/2021, Normal         CONTINUE these medications which have NOT CHANGED    Details   gabapentin (NEURONTIN) 100 mg capsule Take 1 capsule (100 mg total) by mouth daily at bedtime, Starting Fri 1/25/2019, Normal      meloxicam (MOBIC) 15 mg tablet Take 1 tablet (15 mg total) by mouth daily, Starting Fri 1/25/2019, Normal      naproxen (NAPROSYN) 500 mg tablet Take 1 tablet (500 mg total) by mouth 2 (two) times a day with meals for 10 days, Starting Mon 10/15/2018, Until Tue 11/6/2018, Print      omeprazole (PriLOSEC) 10 mg delayed release capsule Take 10 mg by mouth daily, Historical Med      ramipril (ALTACE) 2 5 mg capsule Take 2 5 mg by mouth 2 (two) times a day  , Historical Med      RaNITidine HCl (RANITIDINE 75 PO) Take by mouth, Historical Med      traZODone (DESYREL) 150 mg tablet Take 150 mg by mouth daily at bedtime, Historical Med           No discharge procedures on file      PDMP Review       Value Time User    PDMP Reviewed  Yes 10/5/2020  3:55 PM Donnie England PA-C          ED Provider  Electronically Signed by           Naveen Alvarez PA-C  04/05/21 6907

## 2021-04-05 NOTE — DISCHARGE INSTRUCTIONS
Take Tylenol as prescribed as needed for headache  Follow-up with PCP for further evaluation of symptoms  Return to ED if symptoms worsen including persistent headache, vision changes, dizziness, worst headache of your life, persistent chest pain, palpitations, shortness of breath

## 2021-04-15 ENCOUNTER — HOSPITAL ENCOUNTER (EMERGENCY)
Facility: HOSPITAL | Age: 31
Discharge: HOME | End: 2021-04-15
Attending: EMERGENCY MEDICINE | Admitting: EMERGENCY MEDICINE
Payer: COMMERCIAL

## 2021-04-15 VITALS
WEIGHT: 267.86 LBS | HEART RATE: 96 BPM | DIASTOLIC BLOOD PRESSURE: 89 MMHG | TEMPERATURE: 98.4 F | OXYGEN SATURATION: 98 % | SYSTOLIC BLOOD PRESSURE: 188 MMHG | RESPIRATION RATE: 18 BRPM | BODY MASS INDEX: 37.36 KG/M2

## 2021-04-15 DIAGNOSIS — R00.2 PALPITATIONS: Primary | ICD-10-CM

## 2021-04-15 DIAGNOSIS — F41.9 ANXIETY: ICD-10-CM

## 2021-04-15 LAB
ATRIAL RATE: 113 BPM
P AXIS: 120 DEGREES
PR INTERVAL: 178 MS
QRS AXIS: 118 DEGREES
QRSD INTERVAL: 74 MS
QT INTERVAL: 338 MS
QTC INTERVAL: 463 MS
T WAVE AXIS: 152 DEGREES
VENTRICULAR RATE: 113 BPM

## 2021-04-15 PROCEDURE — 93005 ELECTROCARDIOGRAM TRACING: CPT

## 2021-04-15 PROCEDURE — 99284 EMERGENCY DEPT VISIT MOD MDM: CPT

## 2021-04-15 PROCEDURE — 99284 EMERGENCY DEPT VISIT MOD MDM: CPT | Performed by: EMERGENCY MEDICINE

## 2021-04-15 PROCEDURE — 93010 ELECTROCARDIOGRAM REPORT: CPT

## 2021-04-15 RX ORDER — HYDROXYZINE HYDROCHLORIDE 25 MG/1
25 TABLET, FILM COATED ORAL EVERY 6 HOURS
Qty: 12 TABLET | Refills: 0 | Status: SHIPPED | OUTPATIENT
Start: 2021-04-15

## 2021-04-15 NOTE — ED PROVIDER NOTES
History  Chief Complaint   Patient presents with    Rapid Heart Rate     Pt reports sitting at home and hearing heartbeat  Has hx of anxiety  Pt is restless in triage  History provided by:  Patient   used: No    Rapid Heart Rate  Palpitations quality:  Fast  Onset quality: At rest  Duration:  30 minutes  Timing:  Sporadic  Progression:  Improving  Chronicity:  Recurrent  Context: anxiety    Relieved by:  Nothing  Worsened by:  Nothing  Ineffective treatments:  None tried  Associated symptoms: no back pain, no chest pain, no cough, no dizziness, no lower extremity edema, no nausea, no near-syncope, no shortness of breath and no vomiting    Risk factors comment:  Hx of Anxiety, Depression      Prior to Admission Medications   Prescriptions Last Dose Informant Patient Reported? Taking?    RaNITidine HCl (RANITIDINE 75 PO)   Yes No   Sig: Take by mouth   acetaminophen (TYLENOL) 500 mg tablet   No No   Sig: Take 1 tablet (500 mg total) by mouth every 6 (six) hours as needed for mild pain, moderate pain or headaches   gabapentin (NEURONTIN) 100 mg capsule   No No   Sig: Take 1 capsule (100 mg total) by mouth daily at bedtime   meloxicam (MOBIC) 15 mg tablet   No No   Sig: Take 1 tablet (15 mg total) by mouth daily   naproxen (NAPROSYN) 500 mg tablet   No No   Sig: Take 1 tablet (500 mg total) by mouth 2 (two) times a day with meals for 10 days   omeprazole (PriLOSEC) 10 mg delayed release capsule   Yes No   Sig: Take 10 mg by mouth daily   ramipril (ALTACE) 2 5 mg capsule   Yes No   Sig: Take 2 5 mg by mouth 2 (two) times a day     traZODone (DESYREL) 150 mg tablet   Yes No   Sig: Take 150 mg by mouth daily at bedtime      Facility-Administered Medications: None       Past Medical History:   Diagnosis Date    Chronic pain     GERD (gastroesophageal reflux disease)     Hypertension     Manic depressive disorder (HCC)        Past Surgical History:   Procedure Laterality Date    NO PAST SURGERIES         Family History   Problem Relation Age of Onset    Hypertension Mother     Diabetes Father      I have reviewed and agree with the history as documented  E-Cigarette/Vaping     E-Cigarette/Vaping Substances     Social History     Tobacco Use    Smoking status: Never Smoker    Smokeless tobacco: Never Used   Substance Use Topics    Alcohol use: No    Drug use: No       Review of Systems   Constitutional: Negative for chills and fever  HENT: Negative for facial swelling, sore throat and trouble swallowing  Eyes: Negative for pain and visual disturbance  Respiratory: Negative for cough and shortness of breath  Cardiovascular: Positive for palpitations  Negative for chest pain, leg swelling and near-syncope  Gastrointestinal: Negative for abdominal pain, blood in stool, diarrhea, nausea and vomiting  Genitourinary: Negative for dysuria and flank pain  Musculoskeletal: Negative for back pain, neck pain and neck stiffness  Skin: Negative for pallor and rash  Allergic/Immunologic: Negative for environmental allergies and immunocompromised state  Neurological: Negative for dizziness and headaches  Hematological: Negative for adenopathy  Does not bruise/bleed easily  Psychiatric/Behavioral: Negative for agitation, behavioral problems and suicidal ideas  The patient is nervous/anxious  All other systems reviewed and are negative  Physical Exam  Physical Exam  Vitals signs and nursing note reviewed  Constitutional:       General: He is not in acute distress  Appearance: He is well-developed  HENT:      Head: Normocephalic and atraumatic  Eyes:      Extraocular Movements: Extraocular movements intact  Pupils: Pupils are equal, round, and reactive to light  Neck:      Musculoskeletal: Normal range of motion and neck supple  Cardiovascular:      Rate and Rhythm: Normal rate and regular rhythm  Pulses: Normal pulses     Pulmonary:      Effort: Pulmonary effort is normal  No respiratory distress  Abdominal:      General: There is no distension  Palpations: Abdomen is soft  Tenderness: There is no abdominal tenderness  There is no guarding or rebound  Musculoskeletal: Normal range of motion  Skin:     General: Skin is warm and dry  Neurological:      General: No focal deficit present  Mental Status: He is alert and oriented to person, place, and time  Cranial Nerves: No cranial nerve deficit  Psychiatric:         Mood and Affect: Mood is anxious  Speech: Speech normal          Behavior: Behavior normal          Thought Content: Thought content is not paranoid  Thought content does not include homicidal or suicidal ideation  Vital Signs  ED Triage Vitals [04/15/21 1852]   Temperature Pulse Respirations Blood Pressure SpO2   98 4 °F (36 9 °C) (!) 124 20 (!) 188/89 100 %      Temp Source Heart Rate Source Patient Position - Orthostatic VS BP Location FiO2 (%)   Oral Monitor Sitting Right arm --      Pain Score       --           Vitals:    04/15/21 1852 04/15/21 1945   BP: (!) 188/89    Pulse: (!) 124 96   Patient Position - Orthostatic VS: Sitting          Visual Acuity      ED Medications  Medications - No data to display    Diagnostic Studies  Results Reviewed     None                 No orders to display              Procedures  Procedures         ED Course  ED Course as of Apr 15 1955   Thu Apr 15, 2021   1941 Patient feels better, HR now in 90s in  room  MDM  Number of Diagnoses or Management Options  Diagnosis management comments: Patient is 28 yo male, hx of depression, Anxiety, [facility], comes with episode of panic attack, anxiety, felt heart pounding, denies any precipitating event, no SI/HI, not on meds currently; now feels better  Denies OTC cough/old med use, no drug use   On exam, no acute distress VSS, HR improved, normal neuro exam  Impression: Anxiety, now better, had recent labs about 10 days back including Trop, BNP, TSH that were normal  No indication for repeat testing as feeling better  We will short prescription of AtArax, [facility]  Disposition  Final diagnoses:   Palpitations   Anxiety     Time reflects when diagnosis was documented in both MDM as applicable and the Disposition within this note     Time User Action Codes Description Comment    4/15/2021  7:52 PM Deb Maggie Add [R00 2] Palpitations     4/15/2021  7:52 PM Deb Maggie Add [F41 9] Anxiety       ED Disposition     ED Disposition Condition Date/Time Comment    Discharge Stable Thu Apr 15, 2021  7:52 PM [facility] discharge to home/self care  Follow-up Information     Follow up With Specialties Details Why Contact Info    Luz Freed, [address], Nurse Practitioner Schedule an appointment as soon as possible for a visit   Robina Redd [address] [place] [address]  [phone]            Patient's Medications   Discharge Prescriptions    HYDROXYZINE HCL (ATARAX) 25 MG TABLET    Take 1 tablet (25 mg total) by mouth every 6 (six) hours       Start Date: 4/15/2021 End Date: --       Order Dose: 25 mg       Quantity: 12 tablet    Refills: 0     No discharge procedures on file      PDMP Review       Value Time User    PDMP Reviewed  Yes 10/5/2020  3:55 PM Eugenia Luna PA-C          ED Provider  Electronically Signed by           Gerry Marie MD  04/15/21 2000

## 2021-04-15 NOTE — ED NOTES
Pt in EKG room calming down, reports to RN he is feeling much better         Rachel Schilling, VARINDER  04/15/21 1924

## 2021-05-21 ENCOUNTER — HOSPITAL ENCOUNTER (EMERGENCY)
Facility: HOSPITAL | Age: 31
Discharge: HOME | End: 2021-05-21
Attending: EMERGENCY MEDICINE | Admitting: EMERGENCY MEDICINE
Payer: COMMERCIAL

## 2021-05-21 VITALS
BODY MASS INDEX: 37.36 KG/M2 | DIASTOLIC BLOOD PRESSURE: 96 MMHG | TEMPERATURE: 98.3 F | OXYGEN SATURATION: 96 % | HEART RATE: 91 BPM | RESPIRATION RATE: 18 BRPM | WEIGHT: 267.86 LBS | SYSTOLIC BLOOD PRESSURE: 159 MMHG

## 2021-05-21 DIAGNOSIS — F32.A DEPRESSION: Primary | ICD-10-CM

## 2021-05-21 DIAGNOSIS — R45.89 THOUGHTS OF SELF HARM: ICD-10-CM

## 2021-05-21 DIAGNOSIS — F41.9 ANXIETY: ICD-10-CM

## 2021-05-21 LAB
AMPHETAMINES SERPL QL SCN: NEGATIVE
BARBITURATES UR QL: NEGATIVE
BENZODIAZ UR QL: NEGATIVE
COCAINE UR QL: NEGATIVE
ETHANOL EXG-MCNC: 0 MG/DL
METHADONE UR QL: NEGATIVE
OPIATES UR QL SCN: NEGATIVE
OXYCODONE+OXYMORPHONE UR QL SCN: NEGATIVE
PCP UR QL: NEGATIVE
SARS-COV-2 RNA RESP QL NAA+PROBE: NEGATIVE
THC UR QL: NEGATIVE

## 2021-05-21 PROCEDURE — 82075 ASSAY OF BREATH ETHANOL: CPT | Performed by: EMERGENCY MEDICINE

## 2021-05-21 PROCEDURE — U0003 INFECTIOUS AGENT DETECTION BY NUCLEIC ACID (DNA OR RNA); SEVERE ACUTE RESPIRATORY SYNDROME CORONAVIRUS 2 (SARS-COV-2) (CORONAVIRUS DISEASE [COVID-19]), AMPLIFIED PROBE TECHNIQUE, MAKING USE OF HIGH THROUGHPUT TECHNOLOGIES AS DESCRIBED BY CMS-2020-01-R: HCPCS | Performed by: EMERGENCY MEDICINE

## 2021-05-21 PROCEDURE — 80307 DRUG TEST PRSMV CHEM ANLYZR: CPT | Performed by: EMERGENCY MEDICINE

## 2021-05-21 PROCEDURE — 99285 EMERGENCY DEPT VISIT HI MDM: CPT

## 2021-05-21 PROCEDURE — U0005 INFEC AGEN DETEC AMPLI PROBE: HCPCS | Performed by: EMERGENCY MEDICINE

## 2021-05-21 PROCEDURE — 99282 EMERGENCY DEPT VISIT SF MDM: CPT | Performed by: EMERGENCY MEDICINE
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
## 2021-05-22 NOTE — ED NOTES
[facility]    Name and Date of Birth:  Gisele Duncan 27 y o  1990    Date of Referral: May 21, 2021    Presenting Symptoms and Stressors:      Symptoms:  depressive symptoms, anxiety symptoms and suicidal ideation without plan  Stressors:  family conflict, marital problems, chronic pain, everyday stressors and ongoing anxiety, combat   Access to Weapons:  No    Smoking Status: denies use    Substance Use:  None    Suicidal Ideation: None at present    Homicidal Ideation: None    Depressed Mood: sadness, low motivation, excessive guilt    Jody/Hypomania: None    Psychosis: None    Agitation: No    Appetite Changes: normal appetite    Sleep Disturbance: normal sleep    Diagnoses:  1   Major Depressive Disorder, recurrent, severe without psychotic features    Current Psychiatrist or Therapist:    Psychiatrist: [facility]  Therapist: Sakshi Hawley they Require Ambulatory Assistance: No    Communication Assistance: not required     Legal Issues: None        Keven Bold

## 2021-05-24 ENCOUNTER — TELEPHONE (OUTPATIENT)
Dept: PSYCHOLOGY | Facility: CLINIC | Age: 31
End: 2021-05-24

## 2021-05-24 NOTE — TELEPHONE ENCOUNTER
Called patient and left voicemail to return my call to discuss PHP referral and to schedule      Clarence Salvador

## 2021-10-28 ENCOUNTER — APPOINTMENT (EMERGENCY)
Dept: CT IMAGING | Facility: HOSPITAL | Age: 31
DRG: 343 | End: 2021-10-28
Payer: COMMERCIAL

## 2021-10-28 ENCOUNTER — HOSPITAL ENCOUNTER (INPATIENT)
Facility: HOSPITAL | Age: 31
LOS: 2 days | Discharge: HOME | DRG: 343 | End: 2021-10-30
Attending: EMERGENCY MEDICINE | Admitting: SURGERY
Payer: COMMERCIAL

## 2021-10-28 DIAGNOSIS — K35.80 ACUTE APPENDICITIS: Primary | ICD-10-CM

## 2021-10-28 LAB
ALBUMIN SERPL BCP-MCNC: 3.9 G/DL (ref 3.5–5)
ALP SERPL-CCNC: 120 U/L (ref 46–116)
ALT SERPL W P-5'-P-CCNC: 47 U/L (ref 12–78)
ANION GAP SERPL CALCULATED.3IONS-SCNC: 11 MMOL/L (ref 4–13)
AST SERPL W P-5'-P-CCNC: 20 U/L (ref 5–45)
BASOPHILS # BLD AUTO: 0.06 THOUSANDS/ΜL (ref 0–0.1)
BASOPHILS NFR BLD AUTO: 0 % (ref 0–1)
BILIRUB SERPL-MCNC: 0.22 MG/DL (ref 0.2–1)
BILIRUB UR QL STRIP: NEGATIVE
BUN SERPL-MCNC: 12 MG/DL (ref 5–25)
CALCIUM SERPL-MCNC: 9.4 MG/DL (ref 8.3–10.1)
CHLORIDE SERPL-SCNC: 103 MMOL/L (ref 100–108)
CLARITY UR: CLEAR
CO2 SERPL-SCNC: 27 MMOL/L (ref 21–32)
COLOR UR: YELLOW
CREAT SERPL-MCNC: 1.29 MG/DL (ref 0.6–1.3)
EOSINOPHIL # BLD AUTO: 0.04 THOUSAND/ΜL (ref 0–0.61)
EOSINOPHIL NFR BLD AUTO: 0 % (ref 0–6)
ERYTHROCYTE [DISTWIDTH] IN BLOOD BY AUTOMATED COUNT: 12.6 % (ref 11.6–15.1)
GFR SERPL CREATININE-BSD FRML MDRD: 73 ML/MIN/1.73SQ M
GLUCOSE SERPL-MCNC: 108 MG/DL (ref 65–140)
GLUCOSE UR STRIP-MCNC: NEGATIVE MG/DL
HCT VFR BLD AUTO: 45.7 % (ref 36.5–49.3)
HGB BLD-MCNC: 15.3 G/DL (ref 12–17)
HGB UR QL STRIP.AUTO: NEGATIVE
IMM GRANULOCYTES # BLD AUTO: 0.1 THOUSAND/UL (ref 0–0.2)
IMM GRANULOCYTES NFR BLD AUTO: 1 % (ref 0–2)
KETONES UR STRIP-MCNC: NEGATIVE MG/DL
LEUKOCYTE ESTERASE UR QL STRIP: NEGATIVE
LIPASE SERPL-CCNC: 104 U/L (ref 73–393)
LYMPHOCYTES # BLD AUTO: 4.81 THOUSANDS/ΜL (ref 0.6–4.47)
LYMPHOCYTES NFR BLD AUTO: 23 % (ref 14–44)
MCH RBC QN AUTO: 29.9 PG (ref 26.8–34.3)
MCHC RBC AUTO-ENTMCNC: 33.5 G/DL (ref 31.4–37.4)
MCV RBC AUTO: 89 FL (ref 82–98)
MONOCYTES # BLD AUTO: 1.53 THOUSAND/ΜL (ref 0.17–1.22)
MONOCYTES NFR BLD AUTO: 7 % (ref 4–12)
NEUTROPHILS # BLD AUTO: 14.86 THOUSANDS/ΜL (ref 1.85–7.62)
NEUTS SEG NFR BLD AUTO: 69 % (ref 43–75)
NITRITE UR QL STRIP: NEGATIVE
NRBC BLD AUTO-RTO: 0 /100 WBCS
PH UR STRIP.AUTO: 7 [PH] (ref 4.5–8)
PLATELET # BLD AUTO: 348 THOUSANDS/UL (ref 149–390)
PMV BLD AUTO: 9.9 FL (ref 8.9–12.7)
POTASSIUM SERPL-SCNC: 3.7 MMOL/L (ref 3.5–5.3)
PROT SERPL-MCNC: 8.1 G/DL (ref 6.4–8.2)
PROT UR STRIP-MCNC: NEGATIVE MG/DL
RBC # BLD AUTO: 5.12 MILLION/UL (ref 3.88–5.62)
SODIUM SERPL-SCNC: 141 MMOL/L (ref 136–145)
SP GR UR STRIP.AUTO: 1.02 (ref 1–1.03)
UROBILINOGEN UR QL STRIP.AUTO: 1 E.U./DL
WBC # BLD AUTO: 21.4 THOUSAND/UL (ref 4.31–10.16)

## 2021-10-28 PROCEDURE — G1004 CDSM NDSC: HCPCS

## 2021-10-28 PROCEDURE — 81003 URINALYSIS AUTO W/O SCOPE: CPT

## 2021-10-28 PROCEDURE — 80053 COMPREHEN METABOLIC PANEL: CPT | Performed by: EMERGENCY MEDICINE

## 2021-10-28 PROCEDURE — 85025 COMPLETE CBC W/AUTO DIFF WBC: CPT | Performed by: EMERGENCY MEDICINE

## 2021-10-28 PROCEDURE — 96361 HYDRATE IV INFUSION ADD-ON: CPT

## 2021-10-28 PROCEDURE — 74177 CT ABD & PELVIS W/CONTRAST: CPT

## 2021-10-28 PROCEDURE — 83690 ASSAY OF LIPASE: CPT | Performed by: EMERGENCY MEDICINE

## 2021-10-28 PROCEDURE — 36415 COLL VENOUS BLD VENIPUNCTURE: CPT | Performed by: EMERGENCY MEDICINE

## 2021-10-28 PROCEDURE — 99285 EMERGENCY DEPT VISIT HI MDM: CPT

## 2021-10-28 PROCEDURE — 99285 EMERGENCY DEPT VISIT HI MDM: CPT | Performed by: EMERGENCY MEDICINE

## 2021-10-28 PROCEDURE — 96374 THER/PROPH/DIAG INJ IV PUSH: CPT

## 2021-10-28 PROCEDURE — 96375 TX/PRO/DX INJ NEW DRUG ADDON: CPT

## 2021-10-28 RX ORDER — ONDANSETRON 2 MG/ML
4 INJECTION INTRAMUSCULAR; INTRAVENOUS ONCE
Status: COMPLETED | OUTPATIENT
Start: 2021-10-28 | End: 2021-10-28

## 2021-10-28 RX ORDER — OXYCODONE HYDROCHLORIDE 5 MG/1
5 TABLET ORAL EVERY 4 HOURS PRN
Status: DISCONTINUED | OUTPATIENT
Start: 2021-10-28 | End: 2021-10-29

## 2021-10-28 RX ORDER — SODIUM CHLORIDE, SODIUM LACTATE, POTASSIUM CHLORIDE, CALCIUM CHLORIDE 600; 310; 30; 20 MG/100ML; MG/100ML; MG/100ML; MG/100ML
100 INJECTION, SOLUTION INTRAVENOUS CONTINUOUS
Status: DISCONTINUED | OUTPATIENT
Start: 2021-10-28 | End: 2021-10-29

## 2021-10-28 RX ORDER — HYDROMORPHONE HCL/PF 1 MG/ML
0.5 SYRINGE (ML) INJECTION
Status: DISCONTINUED | OUTPATIENT
Start: 2021-10-28 | End: 2021-10-29

## 2021-10-28 RX ORDER — OXYCODONE HYDROCHLORIDE 10 MG/1
10 TABLET ORAL EVERY 4 HOURS PRN
Status: DISCONTINUED | OUTPATIENT
Start: 2021-10-28 | End: 2021-10-29

## 2021-10-28 RX ORDER — HEPARIN SODIUM 5000 [USP'U]/ML
5000 INJECTION, SOLUTION INTRAVENOUS; SUBCUTANEOUS EVERY 8 HOURS SCHEDULED
Status: DISCONTINUED | OUTPATIENT
Start: 2021-10-28 | End: 2021-10-30 | Stop reason: HOSPADM

## 2021-10-28 RX ORDER — ONDANSETRON 2 MG/ML
4 INJECTION INTRAMUSCULAR; INTRAVENOUS EVERY 6 HOURS PRN
Status: DISCONTINUED | OUTPATIENT
Start: 2021-10-28 | End: 2021-10-30 | Stop reason: HOSPADM

## 2021-10-28 RX ORDER — CEFAZOLIN SODIUM 2 G/50ML
2000 SOLUTION INTRAVENOUS EVERY 8 HOURS
Status: DISCONTINUED | OUTPATIENT
Start: 2021-10-28 | End: 2021-10-29

## 2021-10-28 RX ORDER — ACETAMINOPHEN 325 MG/1
650 TABLET ORAL EVERY 6 HOURS PRN
Status: DISCONTINUED | OUTPATIENT
Start: 2021-10-28 | End: 2021-10-29

## 2021-10-28 RX ADMIN — CEFAZOLIN SODIUM 2000 MG: 2 SOLUTION INTRAVENOUS at 23:57

## 2021-10-28 RX ADMIN — SODIUM CHLORIDE 1000 ML: 0.9 INJECTION, SOLUTION INTRAVENOUS at 19:06

## 2021-10-28 RX ADMIN — HEPARIN SODIUM 5000 UNITS: 5000 INJECTION INTRAVENOUS; SUBCUTANEOUS at 23:25

## 2021-10-28 RX ADMIN — OXYCODONE HYDROCHLORIDE 5 MG: 5 TABLET ORAL at 23:39

## 2021-10-28 RX ADMIN — SODIUM CHLORIDE, SODIUM LACTATE, POTASSIUM CHLORIDE, AND CALCIUM CHLORIDE 100 ML/HR: .6; .31; .03; .02 INJECTION, SOLUTION INTRAVENOUS at 23:05

## 2021-10-28 RX ADMIN — METRONIDAZOLE 500 MG: 500 INJECTION, SOLUTION INTRAVENOUS at 23:24

## 2021-10-28 RX ADMIN — IOHEXOL 100 ML: 350 INJECTION, SOLUTION INTRAVENOUS at 20:13

## 2021-10-28 RX ADMIN — ONDANSETRON 4 MG: 2 INJECTION INTRAMUSCULAR; INTRAVENOUS at 18:53

## 2021-10-28 RX ADMIN — MORPHINE SULFATE 6 MG: 2 INJECTION, SOLUTION INTRAMUSCULAR; INTRAVENOUS at 19:06

## 2021-10-28 NOTE — ED ATTENDING ATTESTATION
10/28/2021  IMercedes DO, saw and evaluated the patient  I have discussed the patient with the resident/non-physician practitioner and agree with the resident's/non-physician practitioner's findings, Plan of Care, and MDM as documented in the resident's/non-physician practitioner's note, except where noted  All available labs and Radiology studies were reviewed  I was present for key portions of any procedure(s) performed by the resident/non-physician practitioner and I was immediately available to provide assistance  At this point I agree with the current assessment done in the Emergency Department  I have conducted an independent evaluation of this patient a history and physical is as follows:    31 yo M presenting for evaluation of abdominal pain  Started this morning after eating a hot pocket  Initially thought it felt like GERD, but it lasted longer and moved lower in his abdomen  No a/e factors  No history of similar in the past  Nausea without any vomiting   Denies any diarrhea or cristina blood in stools, possibly darker than usual    Denies fevers, chills, CP, SOB, urinary complaints  Exam sig for tenderness to palpation RLQ    MDM: 31 yo M with RLQ abdominal pain/nausea- symptomatic treatment, abdominal labs, urine, CT to r/o appendicitis, dispo accordingly    ED Course         Critical Care Time  Procedures

## 2021-10-28 NOTE — ED PROVIDER NOTES
History  Chief Complaint   Patient presents with    Abdominal Pain     Patient reports abd pain  States he has hx of gerd and states pain initially felt like reflux but has gotten worse since last night  Patient is a 32year old male with a past medical history of GERD presenting with abdominal pain  He states that earlier this morning he ate a hot pocket and shortly afterwards began to experience abdominal pain  He states that this initially felt like reflux, but has since gotten worse  He describes it as a constant crampy abdominal pain that is predominately on the left side  He also associates nausea without any vomiting  He has been slightly lightheaded without any headaches, visual changes, or syncope  He has had no diarrhea, constipation, or changes in urination  He has no fever, chills, or sick contacts  He has never had anything like this before  He has not tried anything for his symptoms  He no history of abdominal surgeries  He has no other complaints at this time  Prior to Admission Medications   Prescriptions Last Dose Informant Patient Reported? Taking?   acetaminophen (TYLENOL) 500 mg tablet   No No   Sig: Take 1 tablet (500 mg total) by mouth every 6 (six) hours as needed for mild pain, moderate pain or headaches   hydrOXYzine HCL (ATARAX) 25 mg tablet   No No   Sig: Take 1 tablet (25 mg total) by mouth every 6 (six) hours   Patient taking differently: Take 50 mg by mouth daily       Facility-Administered Medications: None       Past Medical History:   Diagnosis Date    Chronic pain     GERD (gastroesophageal reflux disease)     Hypertension     Manic depressive disorder (HCC)        Past Surgical History:   Procedure Laterality Date    NO PAST SURGERIES         Family History   Problem Relation Age of Onset    Hypertension Mother     Diabetes Father      I have reviewed and agree with the history as documented      E-Cigarette/Vaping     E-Cigarette/Vaping Substances     Social History     Tobacco Use    Smoking status: Never Smoker    Smokeless tobacco: Never Used   Substance Use Topics    Alcohol use: No    Drug use: No        Review of Systems   Constitutional: Negative for chills and fever  HENT: Negative for ear pain, rhinorrhea and sore throat  Eyes: Negative for pain  Respiratory: Negative for shortness of breath  Cardiovascular: Negative for chest pain  Gastrointestinal: Positive for abdominal pain and nausea  Negative for constipation, diarrhea and vomiting  Genitourinary: Negative for dysuria  Musculoskeletal: Negative for myalgias  Skin: Negative for rash  Neurological: Positive for light-headedness  Negative for dizziness, syncope and headaches  Psychiatric/Behavioral: Negative for agitation  All other systems reviewed and are negative  Physical Exam  ED Triage Vitals   Temperature Pulse Respirations Blood Pressure SpO2   10/28/21 1709 10/28/21 1709 10/28/21 1709 10/28/21 1955 10/28/21 1709   98 2 °F (36 8 °C) 73 16 127/57 99 %      Temp Source Heart Rate Source Patient Position - Orthostatic VS BP Location FiO2 (%)   10/28/21 1709 10/28/21 1709 10/28/21 1955 10/28/21 1955 --   Oral Monitor Lying Right arm       Pain Score       10/28/21 1709       Worst Possible Pain             Orthostatic Vital Signs  Vitals:    10/29/21 1136 10/29/21 1151 10/29/21 1206 10/29/21 1220   BP: 124/81 133/81 130/78 133/78   Pulse: 78 84 72 74   Patient Position - Orthostatic VS:           Physical Exam  Vitals and nursing note reviewed  Constitutional:       General: He is not in acute distress  Appearance: Normal appearance  HENT:      Head: Normocephalic and atraumatic  Right Ear: External ear normal       Left Ear: External ear normal       Nose: Nose normal    Eyes:      General: No scleral icterus  Right eye: No discharge  Left eye: No discharge  Extraocular Movements: Extraocular movements intact        Conjunctiva/sclera: Conjunctivae normal    Cardiovascular:      Rate and Rhythm: Normal rate and regular rhythm  Pulses: Normal pulses  Heart sounds: Normal heart sounds  No murmur heard  No friction rub  No gallop  Pulmonary:      Effort: Pulmonary effort is normal  No respiratory distress  Breath sounds: Normal breath sounds  Abdominal:      General: Abdomen is flat  Bowel sounds are normal  There is no distension  Palpations: Abdomen is soft  There is no mass  Tenderness: There is abdominal tenderness  There is no right CVA tenderness or left CVA tenderness  Positive signs include McBurney's sign  Negative signs include Alfaro's sign  Comments: Generalized abdominal pain that seems to be worst in the RLQ   Musculoskeletal:         General: Normal range of motion  Cervical back: Normal range of motion  Skin:     General: Skin is warm and dry  Neurological:      General: No focal deficit present  Mental Status: He is alert     Psychiatric:         Mood and Affect: Mood normal          ED Medications  Medications   ondansetron (ZOFRAN) injection 4 mg (has no administration in time range)   heparin (porcine) subcutaneous injection 5,000 Units (5,000 Units Subcutaneous Not Given 10/29/21 0510)   acetaminophen (TYLENOL) tablet 650 mg ( Oral MAR Unhold 10/29/21 1025)   traMADol (ULTRAM) tablet 100 mg (has no administration in time range)   traMADol (ULTRAM) tablet 50 mg (has no administration in time range)   ondansetron (ZOFRAN) injection 4 mg (4 mg Intravenous Given 10/28/21 1853)   sodium chloride 0 9 % bolus 1,000 mL (0 mL Intravenous Stopped 10/28/21 2018)   morphine injection 6 mg (6 mg Intravenous Given 10/28/21 1906)   iohexol (OMNIPAQUE) 350 MG/ML injection (SINGLE-DOSE) 100 mL (100 mL Intravenous Given 10/28/21 2013)   fentaNYL (SUBLIMAZE) injection 25 mcg (25 mcg Intravenous Given 10/29/21 1202)       Diagnostic Studies  Results Reviewed     Procedure Component Value Units Date/Time    Basic metabolic panel [[id number]] Collected: 10/29/21 0438    Lab Status: Final result Specimen: Blood from Arm, Right Updated: 10/29/21 0600     Sodium 139 mmol/L      Potassium 3 5 mmol/L      Chloride 101 mmol/L      CO2 28 mmol/L      ANION GAP 10 mmol/L      BUN 10 mg/dL      Creatinine 1 02 mg/dL      Glucose 99 mg/dL      Calcium 9 0 mg/dL      eGFR 97 ml/min/1 73sq m     Narrative:      [place] guidelines for Chronic Kidney Disease (CKD):     Stage 1 with normal or high GFR (GFR > 90 mL/min/1 73 square meters)    Stage 2 Mild CKD (GFR = 60-89 mL/min/1 73 square meters)    Stage 3A Moderate CKD (GFR = 45-59 mL/min/1 73 square meters)    Stage 3B Moderate CKD (GFR = 30-44 mL/min/1 73 square meters)    Stage 4 Severe CKD (GFR = 15-29 mL/min/1 73 square meters)    Stage 5 End Stage CKD (GFR <15 mL/min/1 73 square meters)  Note: GFR calculation is accurate only with a steady state creatinine    CBC and differential [[id number]]  (Abnormal) Collected: 10/29/21 0438    Lab Status: Final result Specimen: Blood from Arm, Right Updated: 10/29/21 0550     WBC 15 00 Thousand/uL      RBC 4 72 Million/uL      Hemoglobin 14 6 g/dL      Hematocrit 43 4 %      MCV 92 fL      MCH 30 9 pg      MCHC 33 6 g/dL      RDW 12 8 %      MPV 10 9 fL      Platelets 782 Thousands/uL      nRBC 0 /100 WBCs      Neutrophils Relative 67 %      Immat GRANS % 0 %      Lymphocytes Relative 26 %      Monocytes Relative 7 %      Eosinophils Relative 0 %      Basophils Relative 0 %      Neutrophils Absolute 9 93 Thousands/µL      Immature Grans Absolute 0 05 Thousand/uL      Lymphocytes Absolute 3 86 Thousands/µL      Monocytes Absolute 1 10 Thousand/µL      Eosinophils Absolute 0 03 Thousand/µL      Basophils Absolute 0 03 Thousands/µL     Urine Macroscopic, POC [[id number]] Collected: 10/28/21 2000    Lab Status: Final result Specimen: Urine Updated: 10/28/21 2002     Color, UA Yellow     Clarity, UA Clear     pH, UA 7 0     Leukocytes, UA Negative     Nitrite, UA Negative     Protein, UA Negative mg/dl      Glucose, UA Negative mg/dl      Ketones, UA Negative mg/dl      Urobilinogen, UA 1 0 E U /dl      Bilirubin, UA Negative     Blood, UA Negative     Specific Gravity, UA 1 025    Narrative:      CLINITEK RESULT    Comprehensive metabolic panel [[id number]]  (Abnormal) Collected: 10/28/21 1721    Lab Status: Final result Specimen: Blood from Arm, Right Updated: 10/28/21 1800     Sodium 141 mmol/L      Potassium 3 7 mmol/L      Chloride 103 mmol/L      CO2 27 mmol/L      ANION GAP 11 mmol/L      BUN 12 mg/dL      Creatinine 1 29 mg/dL      Glucose 108 mg/dL      Calcium 9 4 mg/dL      AST 20 U/L      ALT 47 U/L      Alkaline Phosphatase 120 U/L      Total Protein 8 1 g/dL      Albumin 3 9 g/dL      Total Bilirubin 0 22 mg/dL      eGFR 73 ml/min/1 73sq m     Narrative:      Con guidelines for Chronic Kidney Disease (CKD):     Stage 1 with normal or high GFR (GFR > 90 mL/min/1 73 square meters)    Stage 2 Mild CKD (GFR = 60-89 mL/min/1 73 square meters)    Stage 3A Moderate CKD (GFR = 45-59 mL/min/1 73 square meters)    Stage 3B Moderate CKD (GFR = 30-44 mL/min/1 73 square meters)    Stage 4 Severe CKD (GFR = 15-29 mL/min/1 73 square meters)    Stage 5 End Stage CKD (GFR <15 mL/min/1 73 square meters)  Note: GFR calculation is accurate only with a steady state creatinine    Lipase [[id number]]  (Normal) Collected: 10/28/21 1721    Lab Status: Final result Specimen: Blood from Arm, Right Updated: 10/28/21 1800     Lipase 104 u/L     CBC and differential [[id number]]  (Abnormal) Collected: 10/28/21 1721    Lab Status: Final result Specimen: Blood from Arm, Right Updated: 10/28/21 1728     WBC 21 40 Thousand/uL      RBC 5 12 Million/uL      Hemoglobin 15 3 g/dL      Hematocrit 45 7 %      MCV 89 fL      MCH 29 9 pg      MCHC 33 5 g/dL      RDW 12 6 %      MPV 9 9 fL      Platelets 348 Thousands/uL      nRBC 0 /100 WBCs      Neutrophils Relative 69 %      Immat GRANS % 1 %      Lymphocytes Relative 23 %      Monocytes Relative 7 %      Eosinophils Relative 0 %      Basophils Relative 0 %      Neutrophils Absolute 14 86 Thousands/µL      Immature Grans Absolute 0 10 Thousand/uL      Lymphocytes Absolute 4 81 Thousands/µL      Monocytes Absolute 1 53 Thousand/µL      Eosinophils Absolute 0 04 Thousand/µL      Basophils Absolute 0 06 Thousands/µL                  CT abdomen pelvis with contrast   ED Interpretation by Aristides Ochoa DO (10/28 2100)   FINDINGS:     ABDOMEN     LOWER CHEST:  Peripherally located 4 mm (2, 4) right lower lobe pulmonary nodule  Focal subpleural  nodular opacity (2, 9)  There is ipsilateral regional hypoventilatory changes in the subpleural region      LIVER/BILIARY TREE:  Unremarkable      GALLBLADDER:  No calcified gallstones  No pericholecystic inflammatory change      SPLEEN:  Unremarkable      PANCREAS:  Unremarkable      ADRENAL GLANDS:  Unremarkable      KIDNEYS/URETERS:  Unremarkable  No hydronephrosis      STOMACH AND BOWEL:  Unremarkable      APPENDIX:  Dilated and slightly thick-walled appendix (601, 62) without perforation or periappendiceal abscess  No appendicolith  Mild periappendiceal fat stranding and subcentimeter lymph nodes in the right lower quadrant mesentery      ABDOMINOPELVIC CAVITY:  No ascites  No pneumoperitoneum  No retroperitoneal lymphadenopathy      VESSELS:  Unremarkable for patient's age      PELVIS     REPRODUCTIVE ORGANS:  Unremarkable for patient's age      URI   NARY BLADDER:  Unremarkable      ABDOMINAL WALL/INGUINAL REGIONS:  Unremarkable      OSSEOUS STRUCTURES:  No acute fracture or destructive osseous lesion  Mild scoliosis, possibly positional or due to splinting      IMPRESSION:     Findings consistent with acute appendicitis  No findings to suggest appendiceal rupture or periappendiceal abscess   I personally discussed this study with Xochilt Cui on 10/28/2021 at 8:55 PM       Final Result by Elease Buerger, MD (10/28 2056)      Findings consistent with acute appendicitis  No findings to suggest appendiceal rupture or periappendiceal abscess  I personally discussed this study with Xochilt Cui on 10/28/2021 at 8:55 PM       Approximate 4 mm right lower lobe pulmonary nodule  Based on current Fleischner Society 2017 Guidelines on incidental pulmonary nodule, no routine follow-up is needed if the patient is considered low risk for lung cancer  If the patient is considered    high risk for lung cancer, 12 month follow-up non-contrast chest CT is recommended  Workstation performed: [id number]               Procedures  Procedures      ED Course  ED Course as of Oct 29 1321   Thu Oct 28, 2021   1836 WBC(!): 21 40   2105 Findings consistent with acute appendicitis   CT abdomen pelvis with contrast   2111 Discussed with surgery                                          MDM  Number of Diagnoses or Management Options  Acute appendicitis: new and requires workup  Diagnosis management comments: Patient is a 32year old male presenting with abdominal pain  Based on history and evaluation, differential diagnosis includes but is not limited to: appendicitis, cholecystitis, gastritis, GERD, PUD  Plan: CBC, CMP, lipase, CT abdomen pelvis, IVF, zofran, morphine    Patient pain improving with medications  CBC shows an elevated WBC count  CT of the abdomen and pelvis significant for acute appendicitis  Will contact surgery  This finding has been discussed with the patient  Surgery recommending admission secondary to appendicitis  The patient seems to understand this plan and is agreeable  All questions answered  Patient accepted for admission to [facility]         Amount and/or Complexity of Data Reviewed  Clinical lab tests: reviewed and ordered  Tests in the radiology section of CPT®: ordered and reviewed  Review and summarize past medical records: yes  Discuss the patient with other providers: yes  Independent visualization of images, tracings, or specimens: yes    Risk of Complications, Morbidity, and/or Mortality  Presenting problems: moderate  Diagnostic procedures: low  Management options: low    Patient Progress  Patient progress: stable      Disposition  Final diagnoses:   Acute appendicitis     Time reflects when diagnosis was documented in both MDM as applicable and the Disposition within this note     Time User Action Codes Description Comment    10/28/2021  9:18 PM Mary Miner A Add [K35 80] Acute appendicitis     10/29/2021 10:53 AM Marsha Tramaine A Modify [K35 80] Acute appendicitis     10/29/2021 11:09 AM Glenis Molina Modify [K35 80] Acute appendicitis       ED Disposition     ED Disposition Condition Date/Time Comment    Admit Stable Thu Oct 28, 2021  9:18 PM Case was discussed with general surgery and the patient's admission status was agreed to be Admission Status: observation status to the service of Dr Hailey Moralez up With Specialties Details Why Contact Info    Hanane Franks MD General Surgery Follow up in 2 week(s)  [address] [id number]            Current Discharge Medication List      START taking these medications    Details   traMADol (ULTRAM) 50 mg tablet Take 1 tablet (50 mg total) by mouth every 6 (six) hours as needed for moderate pain or severe pain for up to 4 days  Qty: 16 tablet, Refills: 0    Associated Diagnoses: Acute appendicitis         CONTINUE these medications which have NOT CHANGED    Details   acetaminophen (TYLENOL) 500 mg tablet Take 1 tablet (500 mg total) by mouth every 6 (six) hours as needed for mild pain, moderate pain or headaches  Qty: 30 tablet, Refills: 0    Associated Diagnoses: Headache      hydrOXYzine HCL (ATARAX) 25 mg tablet Take 1 tablet (25 mg total) by mouth every 6 (six) hours  Qty: 12 tablet, Refills: 0    Associated Diagnoses: Anxiety           Outpatient Discharge Orders   Discharge Diet     Activity as tolerated     Lifting restrictions     No strenuous exercise     Shower on day dressing removed (No bath)     Call provider for:  persistent nausea or vomiting     Call provider for:  severe uncontrolled pain     Call provider for:  redness, tenderness, or signs of infection (pain, swelling, redness, odor or green/yellow discharge around incision site)     No dressing needed     Follow-up with surgeon in 1-2 weeks       PDMP Review       Value Time User    PDMP Reviewed  Yes 10/5/2020  3:55 PM Rosaura Sweet PA-C           ED Provider  Attending physically available and evaluated [facility]  I managed the patient along with the ED Attending      Electronically Signed by         Chas Conrad DO  10/29/21 6673

## 2021-10-29 ENCOUNTER — ANESTHESIA (INPATIENT)
Dept: PERIOP | Facility: HOSPITAL | Age: 31
DRG: 343 | End: 2021-10-29
Payer: COMMERCIAL

## 2021-10-29 ENCOUNTER — ANESTHESIA EVENT (INPATIENT)
Dept: PERIOP | Facility: HOSPITAL | Age: 31
DRG: 343 | End: 2021-10-29
Payer: COMMERCIAL

## 2021-10-29 PROBLEM — K35.80 ACUTE APPENDICITIS: Status: ACTIVE | Noted: 2021-10-29

## 2021-10-29 LAB
ABO GROUP BLD: NORMAL
ABO GROUP BLD: NORMAL
ANION GAP SERPL CALCULATED.3IONS-SCNC: 10 MMOL/L (ref 4–13)
BASOPHILS # BLD AUTO: 0.03 THOUSANDS/ΜL (ref 0–0.1)
BASOPHILS NFR BLD AUTO: 0 % (ref 0–1)
BLD GP AB SCN SERPL QL: NEGATIVE
BUN SERPL-MCNC: 10 MG/DL (ref 5–25)
CALCIUM SERPL-MCNC: 9 MG/DL (ref 8.3–10.1)
CHLORIDE SERPL-SCNC: 101 MMOL/L (ref 100–108)
CO2 SERPL-SCNC: 28 MMOL/L (ref 21–32)
CREAT SERPL-MCNC: 1.02 MG/DL (ref 0.6–1.3)
EOSINOPHIL # BLD AUTO: 0.03 THOUSAND/ΜL (ref 0–0.61)
EOSINOPHIL NFR BLD AUTO: 0 % (ref 0–6)
ERYTHROCYTE [DISTWIDTH] IN BLOOD BY AUTOMATED COUNT: 12.8 % (ref 11.6–15.1)
GFR SERPL CREATININE-BSD FRML MDRD: 97 ML/MIN/1.73SQ M
GLUCOSE SERPL-MCNC: 99 MG/DL (ref 65–140)
HCT VFR BLD AUTO: 43.4 % (ref 36.5–49.3)
HGB BLD-MCNC: 14.6 G/DL (ref 12–17)
IMM GRANULOCYTES # BLD AUTO: 0.05 THOUSAND/UL (ref 0–0.2)
IMM GRANULOCYTES NFR BLD AUTO: 0 % (ref 0–2)
LYMPHOCYTES # BLD AUTO: 3.86 THOUSANDS/ΜL (ref 0.6–4.47)
LYMPHOCYTES NFR BLD AUTO: 26 % (ref 14–44)
MCH RBC QN AUTO: 30.9 PG (ref 26.8–34.3)
MCHC RBC AUTO-ENTMCNC: 33.6 G/DL (ref 31.4–37.4)
MCV RBC AUTO: 92 FL (ref 82–98)
MONOCYTES # BLD AUTO: 1.1 THOUSAND/ΜL (ref 0.17–1.22)
MONOCYTES NFR BLD AUTO: 7 % (ref 4–12)
NEUTROPHILS # BLD AUTO: 9.93 THOUSANDS/ΜL (ref 1.85–7.62)
NEUTS SEG NFR BLD AUTO: 67 % (ref 43–75)
NRBC BLD AUTO-RTO: 0 /100 WBCS
PLATELET # BLD AUTO: 313 THOUSANDS/UL (ref 149–390)
PMV BLD AUTO: 10.9 FL (ref 8.9–12.7)
POTASSIUM SERPL-SCNC: 3.5 MMOL/L (ref 3.5–5.3)
RBC # BLD AUTO: 4.72 MILLION/UL (ref 3.88–5.62)
RH BLD: POSITIVE
RH BLD: POSITIVE
SODIUM SERPL-SCNC: 139 MMOL/L (ref 136–145)
SPECIMEN EXPIRATION DATE: NORMAL
WBC # BLD AUTO: 15 THOUSAND/UL (ref 4.31–10.16)

## 2021-10-29 PROCEDURE — 80048 BASIC METABOLIC PNL TOTAL CA: CPT | Performed by: SURGERY

## 2021-10-29 PROCEDURE — 44970 LAPAROSCOPY APPENDECTOMY: CPT | Performed by: PHYSICIAN ASSISTANT

## 2021-10-29 PROCEDURE — 88304 TISSUE EXAM BY PATHOLOGIST: CPT | Performed by: PATHOLOGY

## 2021-10-29 PROCEDURE — 0DTJ4ZZ RESECTION OF APPENDIX, PERCUTANEOUS ENDOSCOPIC APPROACH: ICD-10-PCS | Performed by: SURGERY

## 2021-10-29 PROCEDURE — 86850 RBC ANTIBODY SCREEN: CPT | Performed by: SURGERY

## 2021-10-29 PROCEDURE — 85025 COMPLETE CBC W/AUTO DIFF WBC: CPT | Performed by: SURGERY

## 2021-10-29 PROCEDURE — 99223 1ST HOSP IP/OBS HIGH 75: CPT | Performed by: SURGERY

## 2021-10-29 PROCEDURE — 86900 BLOOD TYPING SEROLOGIC ABO: CPT | Performed by: SURGERY

## 2021-10-29 PROCEDURE — 44970 LAPAROSCOPY APPENDECTOMY: CPT | Performed by: SURGERY

## 2021-10-29 PROCEDURE — 86901 BLOOD TYPING SEROLOGIC RH(D): CPT | Performed by: SURGERY

## 2021-10-29 RX ORDER — TRAMADOL HYDROCHLORIDE 50 MG/1
50 TABLET ORAL EVERY 6 HOURS PRN
Qty: 16 TABLET | Refills: 0 | Status: SHIPPED | OUTPATIENT
Start: 2021-10-29 | End: 2021-11-02

## 2021-10-29 RX ORDER — ROCURONIUM BROMIDE 10 MG/ML
INJECTION, SOLUTION INTRAVENOUS AS NEEDED
Status: DISCONTINUED | OUTPATIENT
Start: 2021-10-29 | End: 2021-10-29

## 2021-10-29 RX ORDER — METHOCARBAMOL 500 MG/1
500 TABLET, FILM COATED ORAL EVERY 6 HOURS SCHEDULED
Status: DISCONTINUED | OUTPATIENT
Start: 2021-10-29 | End: 2021-10-30 | Stop reason: HOSPADM

## 2021-10-29 RX ORDER — OXYCODONE HYDROCHLORIDE 5 MG/1
5 TABLET ORAL EVERY 4 HOURS PRN
Status: DISCONTINUED | OUTPATIENT
Start: 2021-10-29 | End: 2021-10-30 | Stop reason: HOSPADM

## 2021-10-29 RX ORDER — ACETAMINOPHEN 325 MG/1
975 TABLET ORAL EVERY 6 HOURS SCHEDULED
Status: DISCONTINUED | OUTPATIENT
Start: 2021-10-29 | End: 2021-10-30 | Stop reason: HOSPADM

## 2021-10-29 RX ORDER — HYDROMORPHONE HCL/PF 1 MG/ML
1 SYRINGE (ML) INJECTION
Status: DISCONTINUED | OUTPATIENT
Start: 2021-10-29 | End: 2021-10-30 | Stop reason: HOSPADM

## 2021-10-29 RX ORDER — KETOROLAC TROMETHAMINE 30 MG/ML
INJECTION, SOLUTION INTRAMUSCULAR; INTRAVENOUS AS NEEDED
Status: DISCONTINUED | OUTPATIENT
Start: 2021-10-29 | End: 2021-10-29

## 2021-10-29 RX ORDER — LIDOCAINE HYDROCHLORIDE 20 MG/ML
INJECTION, SOLUTION EPIDURAL; INFILTRATION; INTRACAUDAL; PERINEURAL AS NEEDED
Status: DISCONTINUED | OUTPATIENT
Start: 2021-10-29 | End: 2021-10-29

## 2021-10-29 RX ORDER — ONDANSETRON 2 MG/ML
4 INJECTION INTRAMUSCULAR; INTRAVENOUS ONCE AS NEEDED
Status: DISCONTINUED | OUTPATIENT
Start: 2021-10-29 | End: 2021-10-29 | Stop reason: HOSPADM

## 2021-10-29 RX ORDER — FENTANYL CITRATE/PF 50 MCG/ML
25 SYRINGE (ML) INJECTION
Status: COMPLETED | OUTPATIENT
Start: 2021-10-29 | End: 2021-10-29

## 2021-10-29 RX ORDER — MAGNESIUM HYDROXIDE 1200 MG/15ML
LIQUID ORAL AS NEEDED
Status: DISCONTINUED | OUTPATIENT
Start: 2021-10-29 | End: 2021-10-29 | Stop reason: HOSPADM

## 2021-10-29 RX ORDER — PROPOFOL 10 MG/ML
INJECTION, EMULSION INTRAVENOUS AS NEEDED
Status: DISCONTINUED | OUTPATIENT
Start: 2021-10-29 | End: 2021-10-29

## 2021-10-29 RX ORDER — OXYCODONE HYDROCHLORIDE 10 MG/1
10 TABLET ORAL EVERY 4 HOURS PRN
Status: DISCONTINUED | OUTPATIENT
Start: 2021-10-29 | End: 2021-10-30 | Stop reason: HOSPADM

## 2021-10-29 RX ORDER — MIDAZOLAM HYDROCHLORIDE 2 MG/2ML
INJECTION, SOLUTION INTRAMUSCULAR; INTRAVENOUS AS NEEDED
Status: DISCONTINUED | OUTPATIENT
Start: 2021-10-29 | End: 2021-10-29

## 2021-10-29 RX ORDER — TRAMADOL HYDROCHLORIDE 50 MG/1
50 TABLET ORAL EVERY 6 HOURS PRN
Status: DISCONTINUED | OUTPATIENT
Start: 2021-10-29 | End: 2021-10-29

## 2021-10-29 RX ORDER — FENTANYL CITRATE 50 UG/ML
INJECTION, SOLUTION INTRAMUSCULAR; INTRAVENOUS AS NEEDED
Status: DISCONTINUED | OUTPATIENT
Start: 2021-10-29 | End: 2021-10-29

## 2021-10-29 RX ORDER — BUPIVACAINE HYDROCHLORIDE 5 MG/ML
INJECTION, SOLUTION PERINEURAL AS NEEDED
Status: DISCONTINUED | OUTPATIENT
Start: 2021-10-29 | End: 2021-10-29 | Stop reason: HOSPADM

## 2021-10-29 RX ORDER — TRAMADOL HYDROCHLORIDE 50 MG/1
100 TABLET ORAL EVERY 6 HOURS PRN
Status: DISCONTINUED | OUTPATIENT
Start: 2021-10-29 | End: 2021-10-29

## 2021-10-29 RX ORDER — ONDANSETRON 2 MG/ML
INJECTION INTRAMUSCULAR; INTRAVENOUS AS NEEDED
Status: DISCONTINUED | OUTPATIENT
Start: 2021-10-29 | End: 2021-10-29

## 2021-10-29 RX ADMIN — HEPARIN SODIUM 5000 UNITS: 5000 INJECTION INTRAVENOUS; SUBCUTANEOUS at 21:38

## 2021-10-29 RX ADMIN — ROCURONIUM BROMIDE 50 MG: 50 INJECTION, SOLUTION INTRAVENOUS at 10:28

## 2021-10-29 RX ADMIN — ACETAMINOPHEN 975 MG: 325 TABLET, FILM COATED ORAL at 21:34

## 2021-10-29 RX ADMIN — METRONIDAZOLE 500 MG: 500 INJECTION, SOLUTION INTRAVENOUS at 07:51

## 2021-10-29 RX ADMIN — MIDAZOLAM 2 MG: 1 INJECTION INTRAMUSCULAR; INTRAVENOUS at 10:23

## 2021-10-29 RX ADMIN — FENTANYL CITRATE 25 MCG: 50 INJECTION INTRAMUSCULAR; INTRAVENOUS at 12:02

## 2021-10-29 RX ADMIN — HYDROMORPHONE HYDROCHLORIDE 1 MG: 1 INJECTION, SOLUTION INTRAMUSCULAR; INTRAVENOUS; SUBCUTANEOUS at 20:30

## 2021-10-29 RX ADMIN — SODIUM CHLORIDE, SODIUM LACTATE, POTASSIUM CHLORIDE, AND CALCIUM CHLORIDE 100 ML/HR: .6; .31; .03; .02 INJECTION, SOLUTION INTRAVENOUS at 07:02

## 2021-10-29 RX ADMIN — TRAMADOL HYDROCHLORIDE 100 MG: 50 TABLET, FILM COATED ORAL at 14:01

## 2021-10-29 RX ADMIN — KETOROLAC TROMETHAMINE 30 MG: 30 INJECTION, SOLUTION INTRAMUSCULAR at 11:04

## 2021-10-29 RX ADMIN — FENTANYL CITRATE 50 MCG: 50 INJECTION INTRAMUSCULAR; INTRAVENOUS at 11:05

## 2021-10-29 RX ADMIN — FENTANYL CITRATE 25 MCG: 50 INJECTION INTRAMUSCULAR; INTRAVENOUS at 11:39

## 2021-10-29 RX ADMIN — CEFAZOLIN SODIUM 2000 MG: 2 SOLUTION INTRAVENOUS at 08:57

## 2021-10-29 RX ADMIN — ONDANSETRON 4 MG: 2 INJECTION INTRAMUSCULAR; INTRAVENOUS at 11:03

## 2021-10-29 RX ADMIN — METHOCARBAMOL 500 MG: 500 TABLET ORAL at 21:34

## 2021-10-29 RX ADMIN — FENTANYL CITRATE 50 MCG: 50 INJECTION INTRAMUSCULAR; INTRAVENOUS at 10:45

## 2021-10-29 RX ADMIN — OXYCODONE HYDROCHLORIDE 10 MG: 10 TABLET ORAL at 17:50

## 2021-10-29 RX ADMIN — FENTANYL CITRATE 100 MCG: 50 INJECTION INTRAMUSCULAR; INTRAVENOUS at 10:28

## 2021-10-29 RX ADMIN — FENTANYL CITRATE 25 MCG: 50 INJECTION INTRAMUSCULAR; INTRAVENOUS at 11:54

## 2021-10-29 RX ADMIN — FENTANYL CITRATE 25 MCG: 50 INJECTION INTRAMUSCULAR; INTRAVENOUS at 11:32

## 2021-10-29 RX ADMIN — PROPOFOL 200 MG: 10 INJECTION, EMULSION INTRAVENOUS at 10:28

## 2021-10-29 RX ADMIN — LIDOCAINE HYDROCHLORIDE 100 MG: 20 INJECTION, SOLUTION EPIDURAL; INFILTRATION; INTRACAUDAL; PERINEURAL at 10:28

## 2021-10-29 RX ADMIN — SUGAMMADEX 200 MG: 100 INJECTION, SOLUTION INTRAVENOUS at 11:03

## 2021-10-29 RX ADMIN — ONDANSETRON 4 MG: 2 INJECTION INTRAMUSCULAR; INTRAVENOUS at 21:37

## 2021-10-29 RX ADMIN — OXYCODONE HYDROCHLORIDE 10 MG: 10 TABLET ORAL at 07:05

## 2021-10-29 NOTE — H&P
H&P Exam - General Surgery   [facility] 32 y o  male MRN: [id number]  Unit/Bed#: ED 02 Encounter: [id number]    Assessment/Plan     Assessment:  32 y o  male who presents with RLQ pain, nausea x 1 day, likely acute appendicitis    Plan:  --OR today for laparoscopic appendectomy  --NPO  --IV Abx- Ancef/Flagyl  --IVF  --Pain control  --DVT ppx    History of Present Illness     HPI:  ST  [facility] is a 32 y o  male w/ hx of GERD, HTN, chronic pain, who presents with RLQ pain, nausea x 1 day  Patient reports he began experiencing abdominal pain early yesterday morning after breakfast   Had associated nausea, but denies any emesis, diarrhea, fevers, chills  No history of any prior abdominal surgeries  CTAP shows dilated and slightly thick-walled appendix with mild periappendiceal fat stranding, consistent with acute appendicitis  Labwork shows WBC of 21K  Review of Systems   Constitutional: Negative for activity change, appetite change, chills and fever  HENT: Negative for congestion, sinus pressure and sinus pain  Respiratory: Negative for apnea, cough, chest tightness, shortness of breath and wheezing  Cardiovascular: Negative for chest pain, palpitations and leg swelling  Gastrointestinal: Positive for abdominal pain and nausea  Negative for abdominal distention, constipation, diarrhea and vomiting  Genitourinary: Negative for difficulty urinating and dysuria  Musculoskeletal: Negative for arthralgias, back pain and gait problem  Skin: Negative for color change, pallor, rash and wound  Neurological: Negative for dizziness, tremors, seizures, syncope, facial asymmetry and numbness  Psychiatric/Behavioral: Negative for agitation, behavioral problems and confusion         Historical Information   Past Medical History:   Diagnosis Date    Chronic pain     GERD (gastroesophageal reflux disease)     Hypertension     Manic depressive disorder ([address] )      Past Surgical History: Procedure Laterality Date    NO PAST SURGERIES       Social History   Social History     Substance and Sexual Activity   Alcohol Use No     Social History     Substance and Sexual Activity   Drug Use No     Social History     Tobacco Use   Smoking Status Never Smoker   Smokeless Tobacco Never Used     E-Cigarette/Vaping     E-Cigarette/Vaping Substances     Family History:   Family History   Problem Relation Age of Onset    Hypertension Mother     Diabetes Father        Meds/Allergies   PTA meds:   Prior to Admission Medications   Prescriptions Last Dose Informant Patient Reported?  Taking?   acetaminophen (TYLENOL) 500 mg tablet   No No   Sig: Take 1 tablet (500 mg total) by mouth every 6 (six) hours as needed for mild pain, moderate pain or headaches   hydrOXYzine HCL (ATARAX) 25 mg tablet   No No   Sig: Take 1 tablet (25 mg total) by mouth every 6 (six) hours   Patient taking differently: Take 50 mg by mouth daily       Facility-Administered Medications: None     Allergies   Allergen Reactions    Gabapentin Fatigue       Objective   First Vitals:   Blood Pressure: 127/57 (10/28/21 1955)  Pulse: 73 (10/28/21 1709)  Temperature: 98 2 °F (36 8 °C) (10/28/21 1709)  Temp Source: Oral (10/28/21 1709)  Respirations: 16 (10/28/21 1709)  Weight - Scale: 124 kg (273 lb 5 9 oz) (10/28/21 1709)  SpO2: 99 % (10/28/21 1709)    Current Vitals:   Blood Pressure: 127/57 (10/28/21 1955)  Pulse: 69 (10/28/21 1955)  Temperature: 98 2 °F (36 8 °C) (10/28/21 1709)  Temp Source: Oral (10/28/21 1709)  Respirations: 18 (10/28/21 1955)  Weight - Scale: 124 kg (273 lb 5 9 oz) (10/28/21 1709)  SpO2: 100 % (10/28/21 1955)      Intake/Output Summary (Last 24 hours) at 10/28/2021 2112  Last data filed at 10/28/2021 2018  Gross per 24 hour   Intake 1000 ml   Output --   Net 1000 ml       Invasive Devices     Peripheral Intravenous Line            Peripheral IV 10/28/21 Left Antecubital <1 day                Physical Exam  Constitutional: General: He is not in acute distress  Appearance: He is well-developed  He is not diaphoretic  HENT:      Head: Normocephalic and atraumatic  Cardiovascular:      Rate and Rhythm: Normal rate and regular rhythm  Pulmonary:      Effort: Pulmonary effort is normal  No respiratory distress  Breath sounds: Normal breath sounds  No stridor  No wheezing  Abdominal:      General: There is no distension  Palpations: Abdomen is soft  Tenderness: There is abdominal tenderness  Comments: RLQ tenderness   Musculoskeletal:         General: Normal range of motion  Cervical back: Normal range of motion and neck supple  Skin:     General: Skin is warm  Neurological:      Mental Status: He is alert and oriented to person, place, and time  Lab Results:   CBC:   Lab Results   Component Value Date    WBC 21 40 (H) 10/28/2021    HGB 15 3 10/28/2021    HCT 45 7 10/28/2021    MCV 89 10/28/2021     10/28/2021    MCH 29 9 10/28/2021    MCHC 33 5 10/28/2021    RDW 12 6 10/28/2021    MPV 9 9 10/28/2021    NRBC 0 10/28/2021   , CMP:   Lab Results   Component Value Date    SODIUM 141 10/28/2021    K 3 7 10/28/2021     10/28/2021    CO2 27 10/28/2021    BUN 12 10/28/2021    CREATININE 1 29 10/28/2021    CALCIUM 9 4 10/28/2021    AST 20 10/28/2021    ALT 47 10/28/2021    ALKPHOS 120 (H) 10/28/2021    EGFR 73 10/28/2021   , Coagulation: No results found for: PT, INR, APTT  Imaging:        CT abdomen pelvis with contrast   ED Interpretation by Frannie Shaffer DO (10/28 2100)   FINDINGS:     ABDOMEN     LOWER CHEST:  Peripherally located 4 mm (2, 4) right lower lobe pulmonary nodule  Focal subpleural  nodular opacity (2, 9)  There is ipsilateral regional hypoventilatory changes in the subpleural region      LIVER/BILIARY TREE:  Unremarkable      GALLBLADDER:  No calcified gallstones   No pericholecystic inflammatory change      SPLEEN:  Unremarkable      PANCREAS: Unremarkable      ADRENAL GLANDS:  Unremarkable      KIDNEYS/URETERS:  Unremarkable  No hydronephrosis      STOMACH AND BOWEL:  Unremarkable      APPENDIX:  Dilated and slightly thick-walled appendix (601, 62) without perforation or periappendiceal abscess  No appendicolith  Mild periappendiceal fat stranding and subcentimeter lymph nodes in the right lower quadrant mesentery      ABDOMINOPELVIC CAVITY:  No ascites  No pneumoperitoneum  No retroperitoneal lymphadenopathy      VESSELS:  Unremarkable for patient's age      PELVIS     REPRODUCTIVE ORGANS:  Unremarkable for patient's age      URI   NARY BLADDER:  Unremarkable      ABDOMINAL WALL/INGUINAL REGIONS:  Unremarkable      OSSEOUS STRUCTURES:  No acute fracture or destructive osseous lesion  Mild scoliosis, possibly positional or due to splinting      IMPRESSION:     Findings consistent with acute appendicitis  No findings to suggest appendiceal rupture or periappendiceal abscess  I personally discussed this study with Deb Rose on 10/28/2021 at 8:55 PM       Final Result by Sebastian Keith MD (10/28 2056)      Findings consistent with acute appendicitis  No findings to suggest appendiceal rupture or periappendiceal abscess  I personally discussed this study with Deb Rose on 10/28/2021 at 8:55 PM       Approximate 4 mm right lower lobe pulmonary nodule  Based on current Fleischner Society 2017 Guidelines on incidental pulmonary nodule, no routine follow-up is needed if the patient is considered low risk for lung cancer  If the patient is considered    high risk for lung cancer, 12 month follow-up non-contrast chest CT is recommended                                        Workstation performed: [id number]               Code Status: No Order  Advance Directive and Living Will:      Power of :    POLST:

## 2021-10-29 NOTE — UTILIZATION REVIEW
Initial Clinical Review    Admission: Date/Time/Statement:   Admission Orders (From admission, onward)     Ordered        10/28/21 2130  Inpatient Admission  Once                   Orders Placed This Encounter   Procedures    Inpatient Admission     Standing Status:   Standing     Number of Occurrences:   1     Order Specific Question:   Level of Care     Answer:   Med Surg [16]     Order Specific Question:   Estimated length of stay     Answer:   More than 2 Midnights     Order Specific Question:   Certification     Answer:   I certify that inpatient services are medically necessary for this patient for a duration of greater than two midnights  See H&P and MD Progress Notes for additional information about the patient's course of treatment  ED Arrival Information     Expected Arrival Acuity    - 10/28/2021 17:00 Urgent         Means of arrival Escorted by Service Admission type    Walk-In Self Surgery-General Urgent         Arrival complaint    abdominal pain        Chief Complaint   Patient presents with    Abdominal Pain     Patient reports abd pain  States he has hx of gerd and states pain initially felt like reflux but has gotten worse since last night  Initial Presentation:    33 yo male,  To ER from home,  Admitted IP status to Surgery Service, MS level of care for management of  Acute appendicitis  Presents w/1 day h/o RLQ abd pain, nausea  cTAP shows dilated and thick walled appendix w/fat stranding, c/w acute appendicitis  WBC 21K  Date:   10/29      Day 2:   laparoscopic appendectomy  Performed under general anesthesia     Operative Findings:   Dilated inflamed appendix without evidence of perforation   --------------------------------------------------------------------------------------------------------------------------------      ED Triage Vitals   Temperature Pulse Respirations Blood Pressure SpO2   10/28/21 1709 10/28/21 1709 10/28/21 1709 10/28/21 1955 10/28/21 1709 98 2 °F (36 8 °C) 73 16 127/57 99 %      Temp Source Heart Rate Source Patient Position - Orthostatic VS BP Location FiO2 (%)   10/28/21 1709 10/28/21 1709 10/28/21 1955 10/28/21 1955 --   Oral Monitor Lying Right arm       Pain Score       10/28/21 1709       Worst Possible Pain          Wt Readings from Last 1 Encounters:   10/28/21 124 kg (273 lb 5 9 oz)     Additional Vital Signs:     10/28/21 22:36:41  98 7 °F (37 1 °C)  84  --  150/82  105  96 %  --  --  --     10/29/21 1220  --  74  10Abnormal   133/78  100  98 %  --  --  --  --  --   10/29/21 1212  98 2 °F (36 8 °C)                           Pertinent Labs/Diagnostic Test Results:   ekg none   10/28  CTAP -  Findings consistent with acute appendicitis   No findings to suggest appendiceal rupture or periappendiceal abscess     Approximate 4 mm right lower lobe pulmonary nodule              Results from last 7 days   Lab Units 10/29/21  0438 10/28/21  1721   WBC Thousand/uL 15 00* 21 40*   HEMOGLOBIN g/dL 14 6 15 3   HEMATOCRIT % 43 4 45 7   PLATELETS Thousands/uL 313 348   NEUTROS ABS Thousands/µL 9 93* 14 86*         Results from last 7 days   Lab Units 10/29/21  0438 10/28/21  1721   SODIUM mmol/L 139 141   POTASSIUM mmol/L 3 5 3 7   CHLORIDE mmol/L 101 103   CO2 mmol/L 28 27   ANION GAP mmol/L 10 11   BUN mg/dL 10 12   CREATININE mg/dL 1 02 1 29   EGFR ml/min/1 73sq m 97 73   CALCIUM mg/dL 9 0 9 4     Results from last 7 days   Lab Units 10/28/21  1721   AST U/L 20   ALT U/L 47   ALK PHOS U/L 120*   TOTAL PROTEIN g/dL 8 1   ALBUMIN g/dL 3 9   TOTAL BILIRUBIN mg/dL 0 22         Results from last 7 days   Lab Units 10/29/21  0438 10/28/21  1721   GLUCOSE RANDOM mg/dL 99 108     Results from last 7 days   Lab Units 10/28/21  1721   LIPASE u/L 104     Results from last 7 days   Lab Units 10/28/21  2000   CLARITY UA  Clear   COLOR UA  Yellow   SPEC GRAV UA  1 025   PH UA  7 0   GLUCOSE UA mg/dl Negative   KETONES UA mg/dl Negative   BLOOD UA  Negative PROTEIN UA mg/dl Negative   NITRITE UA  Negative   BILIRUBIN UA  Negative   UROBILINOGEN UA E U /dl 1 0   LEUKOCYTES UA  Negative     ED Treatment:   Medication Administration from 10/28/2021 1659 to 10/28/2021 2211       Date/Time Order Dose Route Action     10/28/2021 1853 ondansetron (ZOFRAN) injection 4 mg 4 mg Intravenous Given     10/28/2021 1906 sodium chloride 0 9 % bolus 1,000 mL 1,000 mL Intravenous New Bag     10/28/2021 1906 morphine injection 6 mg 6 mg Intravenous Given        Past Medical History:   Diagnosis Date    Chronic pain     GERD (gastroesophageal reflux disease)     Hypertension     Manic depressive disorder (HCC)      Admitting Diagnosis: Abdominal pain [R10 9]  Acute appendicitis [K35 80]  Age/Sex: 32 y o  male  Admission Orders:    Admit IP,  SCD's to b/l LE;  IVF LR @  100 cc/hr;  I/O;  Clear liquids - NPO after midnoc;  IV ancef q 8 hrs;  IV flagyl q 8 hrs    Scheduled Medications:  heparin (porcine), 5,000 Units, Subcutaneous, Q8H [address]      Continuous IV Infusions:     PRN Meds:  acetaminophen, 650 mg, Oral, Q6H PRN  ondansetron, 4 mg, Intravenous, Q6H PRN  traMADol, 100 mg, Oral, Q6H PRN  traMADol, 50 mg, Oral, Q6H PRN        Network Utilization Review Department  ATTENTION: Please call with any questions or concerns to [phone] and carefully listen to the prompts so that you are directed to the right person  All voicemails are confidential   Juan David Shelley all requests for admission clinical reviews, approved or denied determinations and any other requests to dedicated fax number below belonging to the campus where the patient is receiving treatment   List of dedicated fax numbers for the Facilities:  [address] DENIALS (Administrative/Medical Necessity) [phone]   [address] (Maternity/NICU/Pediatrics) [phone]   [address] [place] [address] 0236 [facility] [address] [place] [address],[address] [phone]   Pilar Yen [facility] [phone] [id number] [address] [address] Nahum Mckeon [facility] [address] [place] [address] [phone]

## 2021-10-29 NOTE — PLAN OF CARE
Problem: Nutrition/Hydration-ADULT  Goal: Nutrient/Hydration intake appropriate for improving, restoring or maintaining nutritional needs  Description: Monitor and assess patient's nutrition/hydration status for malnutrition  Collaborate with interdisciplinary team and initiate plan and interventions as ordered  Monitor patient's weight and dietary intake as ordered or per policy  Utilize nutrition screening tool and intervene as necessary  Determine patient's food preferences and provide high-protein, high-caloric foods as appropriate       INTERVENTIONS:  - Monitor oral intake, urinary output, labs, and treatment plans  - Assess nutrition and hydration status and recommend course of action  - Evaluate amount of meals eaten  - Assist patient with eating if necessary   - Allow adequate time for meals  - Recommend/ encourage appropriate diets, oral nutritional supplements, and vitamin/mineral supplements  - Order, calculate, and assess calorie counts as needed  - Recommend, monitor, and adjust tube feedings and TPN/PPN based on assessed needs  - Assess need for intravenous fluids  - Provide specific nutrition/hydration education as appropriate  - Include patient/family/caregiver in decisions related to nutrition  Outcome: Progressing     Problem: PAIN - ADULT  Goal: Verbalizes/displays adequate comfort level or baseline comfort level  Description: Interventions:  - Encourage patient to monitor pain and request assistance  - Assess pain using appropriate pain scale  - Administer analgesics based on type and severity of pain and evaluate response  - Implement non-pharmacological measures as appropriate and evaluate response  - Consider cultural and social influences on pain and pain management  - Notify physician/advanced practitioner if interventions unsuccessful or patient reports new pain  Outcome: Progressing     Problem: INFECTION - ADULT  Goal: Absence or prevention of progression during hospitalization  Description: INTERVENTIONS:  - Assess and monitor for signs and symptoms of infection  - Monitor lab/diagnostic results  - Monitor all insertion sites, i e  indwelling lines, tubes, and drains  - Monitor endotracheal if appropriate and nasal secretions for changes in amount and color  - Proctor appropriate cooling/warming therapies per order  - Administer medications as ordered  - Instruct and encourage patient and family to use good hand hygiene technique  - Identify and instruct in appropriate isolation precautions for identified infection/condition  Outcome: Progressing     Problem: SAFETY ADULT  Goal: Patient will remain free of falls  Description: INTERVENTIONS:  - Educate patient/family on patient safety including physical limitations  - Instruct patient to call for assistance with activity   - Consult OT/PT to assist with strengthening/mobility   - Keep Call bell within reach  - Keep bed low and locked with side rails adjusted as appropriate  - Keep care items and personal belongings within reach  - Initiate and maintain comfort rounds  - Make Fall Risk Sign visible to staff  - Offer Toileting every Hours, in advance of need  - Initiate/Maintain alarm  - Obtain necessary fall risk management equipment:   - Apply yellow socks and bracelet for high fall risk patients  - Consider moving patient to room near nurses station  Outcome: Progressing  Goal: Maintain or return to baseline ADL function  Description: INTERVENTIONS:  -  Assess patient's ability to carry out ADLs; assess patient's baseline for ADL function and identify physical deficits which impact ability to perform ADLs (bathing, care of mouth/teeth, toileting, grooming, dressing, etc )  - Assess/evaluate cause of self-care deficits   - Assess range of motion  - Assess patient's mobility; develop plan if impaired  - Assess patient's need for assistive devices and provide as appropriate  - Encourage maximum independence but intervene and supervise when necessary  - Involve family in performance of ADLs  - Assess for home care needs following discharge   - Consider OT consult to assist with ADL evaluation and planning for discharge  - Provide patient education as appropriate  Outcome: Progressing  Goal: Maintains/Returns to pre admission functional level  Description: INTERVENTIONS:  - Perform BMAT or MOVE assessment daily    - Set and communicate daily mobility goal to care team and patient/family/caregiver  - Collaborate with rehabilitation services on mobility goals if consulted  - Perform Range of Motion  times a day  - Reposition patient every hours  - Dangle patient  times a day  - Stand patient times a day  - Ambulate patient  times a day  - Out of bed to chair  times a day   - Out of bed for meals  times a day  - Out of bed for toileting  - Record patient progress and toleration of activity level   Outcome: Progressing     Problem: DISCHARGE PLANNING  Goal: Discharge to home or other facility with appropriate resources  Description: INTERVENTIONS:  - Identify barriers to discharge w/patient and caregiver  - Arrange for needed discharge resources and transportation as appropriate  - Identify discharge learning needs (meds, wound care, etc )  - Arrange for interpretive services to assist at discharge as needed  - Refer to Case Management Department for coordinating discharge planning if the patient needs post-hospital services based on physician/advanced practitioner order or complex needs related to functional status, cognitive ability, or social support system  Outcome: Progressing     Problem: Knowledge Deficit  Goal: Patient/family/caregiver demonstrates understanding of disease process, treatment plan, medications, and discharge instructions  Description: Complete learning assessment and assess knowledge base    Interventions:  - Provide teaching at level of understanding  - Provide teaching via preferred learning methods  Outcome: Progressing     Problem: NEUROSENSORY - ADULT  Goal: Achieves stable or improved neurological status  Description: INTERVENTIONS  - Monitor and report changes in neurological status  - Monitor vital signs such as temperature, blood pressure, glucose, and any other labs ordered   - Initiate measures to prevent increased intracranial pressure  - Monitor for seizure activity and implement precautions if appropriate      Outcome: Progressing  Goal: Achieves maximal functionality and self care  Description: INTERVENTIONS  - Monitor swallowing and airway patency with patient fatigue and changes in neurological status  - Encourage and assist patient to increase activity and self care     - Encourage visually impaired, hearing impaired and aphasic patients to use assistive/communication devices  Outcome: Progressing     Problem: CARDIOVASCULAR - ADULT  Goal: Maintains optimal cardiac output and hemodynamic stability  Description: INTERVENTIONS:  - Monitor I/O, vital signs and rhythm  - Monitor for S/S and trends of decreased cardiac output  - Administer and titrate ordered vasoactive medications to optimize hemodynamic stability  - Assess quality of pulses, skin color and temperature  - Assess for signs of decreased coronary artery perfusion  - Instruct patient to report change in severity of symptoms  Outcome: Progressing  Goal: Absence of cardiac dysrhythmias or at baseline rhythm  Description: INTERVENTIONS:  - Continuous cardiac monitoring, vital signs, obtain 12 lead EKG if ordered  - Administer antiarrhythmic and heart rate control medications as ordered  - Monitor electrolytes and administer replacement therapy as ordered  Outcome: Progressing     Problem: RESPIRATORY - ADULT  Goal: Achieves optimal ventilation and oxygenation  Description: INTERVENTIONS:  - Assess for changes in respiratory status  - Assess for changes in mentation and behavior  - Position to facilitate oxygenation and minimize respiratory effort  - Oxygen administered by appropriate delivery if ordered  - Initiate smoking cessation education as indicated  - Encourage broncho-pulmonary hygiene including cough, deep breathe, Incentive Spirometry  - Assess the need for suctioning and aspirate as needed  - Assess and instruct to report SOB or any respiratory difficulty  - Respiratory Therapy support as indicated  Outcome: Progressing     Problem: GASTROINTESTINAL - ADULT  Goal: Minimal or absence of nausea and/or vomiting  Description: INTERVENTIONS:  - Administer IV fluids if ordered to ensure adequate hydration  - Maintain NPO status until nausea and vomiting are resolved  - Nasogastric tube if ordered  - Administer ordered antiemetic medications as needed  - Provide nonpharmacologic comfort measures as appropriate  - Advance diet as tolerated, if ordered  - Consider nutrition services referral to assist patient with adequate nutrition and appropriate food choices  Outcome: Progressing  Goal: Maintains or returns to baseline bowel function  Description: INTERVENTIONS:  - Assess bowel function  - Encourage oral fluids to ensure adequate hydration  - Administer IV fluids if ordered to ensure adequate hydration  - Administer ordered medications as needed  - Encourage mobilization and activity  - Consider nutritional services referral to assist patient with adequate nutrition and appropriate food choices  Outcome: Progressing  Goal: Maintains adequate nutritional intake  Description: INTERVENTIONS:  - Monitor percentage of each meal consumed  - Identify factors contributing to decreased intake, treat as appropriate  - Assist with meals as needed  - Monitor I&O, weight, and lab values if indicated  - Obtain nutrition services referral as needed  Outcome: Progressing  Goal: Oral mucous membranes remain intact  Description: INTERVENTIONS  - Assess oral mucosa and hygiene practices  - Implement preventative oral hygiene regimen  - Implement oral medicated treatments as ordered  - Initiate Nutrition services referral as needed  Outcome: Progressing     Problem: GENITOURINARY - ADULT  Goal: Maintains or returns to baseline urinary function  Description: INTERVENTIONS:  - Assess urinary function  - Encourage oral fluids to ensure adequate hydration if ordered  - Administer IV fluids as ordered to ensure adequate hydration  - Administer ordered medications as needed  - Offer frequent toileting  - Follow urinary retention protocol if ordered  Outcome: Progressing  Goal: Absence of urinary retention  Description: INTERVENTIONS:  - Assess patients ability to void and empty bladder  - Monitor I/O  - Bladder scan as needed  - Discuss with physician/AP medications to alleviate retention as needed  - Discuss catheterization for long term situations as appropriate  Outcome: Progressing     Problem: METABOLIC, FLUID AND ELECTROLYTES - ADULT  Goal: Electrolytes maintained within normal limits  Description: INTERVENTIONS:  - Monitor labs and assess patient for signs and symptoms of electrolyte imbalances  - Administer electrolyte replacement as ordered  - Monitor response to electrolyte replacements, including repeat lab results as appropriate  - Instruct patient on fluid and nutrition as appropriate  Outcome: Progressing  Goal: Fluid balance maintained  Description: INTERVENTIONS:  - Monitor labs   - Monitor I/O and WT  - Instruct patient on fluid and nutrition as appropriate  - Assess for signs & symptoms of volume excess or deficit  Outcome: Progressing  Goal: Glucose maintained within target range  Description: INTERVENTIONS:  - Monitor Blood Glucose as ordered  - Assess for signs and symptoms of hyperglycemia and hypoglycemia  - Administer ordered medications to maintain glucose within target range  - Assess nutritional intake and initiate nutrition service referral as needed  Outcome: Progressing     Problem: SKIN/TISSUE INTEGRITY - ADULT  Goal: Skin Integrity remains intact(Skin Breakdown Prevention)  Description: Assess:  -Perform Louis assessment every  -Clean and moisturize skin every  -Inspect skin when repositioning, toileting, and assisting with ADLS  -Assess under medical devices such as every  -Assess extremities for adequate circulation and sensation     Bed Management:  -Have minimal linens on bed & keep smooth, unwrinkled  -Change linens as needed when moist or perspiring  -Avoid sitting or lying in one position for more than hours while in bed  -Keep HOB at degrees     Toileting:  -Offer bedside commode  -Assess for incontinence every   -Use incontinent care products after each incontinent episode such as     Activity:  -Mobilize patient times a day  -Encourage activity and walks on unit  -Encourage or provide ROM exercises   -Turn and reposition patient every Hours  -Use appropriate equipment to lift or move patient in bed  -Instruct/ Assist with weight shifting every  when out of bed in chair  -Consider limitation of chair time hour intervals    Skin Care:  -Avoid use of baby powder, tape, friction and shearing, hot water or constrictive clothing  -Relieve pressure over bony prominences using   -Do not massage red bony areas    Next Steps:  -Teach patient strategies to minimize risks such as   -Consider consults to  interdisciplinary teams such as     Outcome: Progressing  Goal: Incision(s), wounds(s) or drain site(s) healing without S/S of infection  Description: INTERVENTIONS  - Assess and document dressing, incision, wound bed, drain sites and surrounding tissue  - Provide patient and family education  - Perform skin care/dressing changes every   Outcome: Progressing     Problem: HEMATOLOGIC - ADULT  Goal: Maintains hematologic stability  Description: INTERVENTIONS  - Assess for signs and symptoms of bleeding or hemorrhage  - Monitor labs  - Administer supportive blood products/factors as ordered and appropriate  Outcome: Progressing     Problem: MUSCULOSKELETAL - ADULT  Goal: Maintain or return mobility to safest level of function  Description: INTERVENTIONS:  - Assess patient's ability to carry out ADLs; assess patient's baseline for ADL function and identify physical deficits which impact ability to perform ADLs (bathing, care of mouth/teeth, toileting, grooming, dressing, etc )  - Assess/evaluate cause of self-care deficits   - Assess range of motion  - Assess patient's mobility  - Assess patient's need for assistive devices and provide as appropriate  - Encourage maximum independence but intervene and supervise when necessary  - Involve family in performance of ADLs  - Assess for home care needs following discharge   - Consider OT consult to assist with ADL evaluation and planning for discharge  - Provide patient education as appropriate  Outcome: Progressing  Goal: Maintain proper alignment of affected body part  Description: INTERVENTIONS:  - Support, maintain and protect limb and body alignment  - Provide patient/ family with appropriate education  Outcome: Progressing

## 2021-10-29 NOTE — PLAN OF CARE
Problem: Nutrition/Hydration-ADULT  Goal: Nutrient/Hydration intake appropriate for improving, restoring or maintaining nutritional needs  Description: Monitor and assess patient's nutrition/hydration status for malnutrition  Collaborate with interdisciplinary team and initiate plan and interventions as ordered  Monitor patient's weight and dietary intake as ordered or per policy  Utilize nutrition screening tool and intervene as necessary  Determine patient's food preferences and provide high-protein, high-caloric foods as appropriate       INTERVENTIONS:  - Monitor oral intake, urinary output, labs, and treatment plans  - Assess nutrition and hydration status and recommend course of action  - Evaluate amount of meals eaten  - Assist patient with eating if necessary   - Allow adequate time for meals  - Recommend/ encourage appropriate diets, oral nutritional supplements, and vitamin/mineral supplements  - Order, calculate, and assess calorie counts as needed  - Recommend, monitor, and adjust tube feedings and TPN/PPN based on assessed needs  - Assess need for intravenous fluids  - Provide specific nutrition/hydration education as appropriate  - Include patient/family/caregiver in decisions related to nutrition  Outcome: Progressing     Problem: PAIN - ADULT  Goal: Verbalizes/displays adequate comfort level or baseline comfort level  Description: Interventions:  - Encourage patient to monitor pain and request assistance  - Assess pain using appropriate pain scale  - Administer analgesics based on type and severity of pain and evaluate response  - Implement non-pharmacological measures as appropriate and evaluate response  - Consider cultural and social influences on pain and pain management  - Notify physician/advanced practitioner if interventions unsuccessful or patient reports new pain  Outcome: Progressing     Problem: INFECTION - ADULT  Goal: Absence or prevention of progression during hospitalization  Description: INTERVENTIONS:  - Assess and monitor for signs and symptoms of infection  - Monitor lab/diagnostic results  - Monitor all insertion sites, i e  indwelling lines, tubes, and drains  - Monitor endotracheal if appropriate and nasal secretions for changes in amount and color  - [place] appropriate cooling/warming therapies per order  - Administer medications as ordered  - Instruct and encourage patient and family to use good hand hygiene technique  - Identify and instruct in appropriate isolation precautions for identified infection/condition  Outcome: Progressing     Problem: SAFETY ADULT  Goal: Patient will remain free of falls  Description: INTERVENTIONS:  - Educate patient/family on patient safety including physical limitations  - Instruct patient to call for assistance with activity   - Consult OT/PT to assist with strengthening/mobility   - Keep Call bell within reach  - Keep bed low and locked with side rails adjusted as appropriate  - Keep care items and personal belongings within reach  - Initiate and maintain comfort rounds  - Make Fall Risk Sign visible to staff  - Apply yellow socks and bracelet for high fall risk patients  - Consider moving patient to room near nurses station  Outcome: Progressing  Goal: Maintain or return to baseline ADL function  Description: INTERVENTIONS:  -  Assess patient's ability to carry out ADLs; assess patient's baseline for ADL function and identify physical deficits which impact ability to perform ADLs (bathing, care of mouth/teeth, toileting, grooming, dressing, etc )  - Assess/evaluate cause of self-care deficits   - Assess range of motion  - Assess patient's mobility; develop plan if impaired  - Assess patient's need for assistive devices and provide as appropriate  - Encourage maximum independence but intervene and supervise when necessary  - Involve family in performance of ADLs  - Assess for home care needs following discharge   - Consider OT consult to assist with ADL evaluation and planning for discharge  - Provide patient education as appropriate  Outcome: Progressing  Goal: Maintains/Returns to pre admission functional level  Description: INTERVENTIONS:  - Perform BMAT or MOVE assessment daily    - Set and communicate daily mobility goal to care team and patient/family/caregiver  - Collaborate with rehabilitation services on mobility goals if consulted  - Perform Range of Motion 3 times a day  - Reposition patient every 2 hours  - Dangle patient 3 times a day  - Stand patient 3 times a day  - Ambulate patient 3 times a day  - Out of bed to chair 3 times a day   - Out of bed for meals 3 times a day  - Out of bed for toileting  - Record patient progress and toleration of activity level   Outcome: Progressing     Problem: DISCHARGE PLANNING  Goal: Discharge to home or other facility with appropriate resources  Description: INTERVENTIONS:  - Identify barriers to discharge w/patient and caregiver  - Arrange for needed discharge resources and transportation as appropriate  - Identify discharge learning needs (meds, wound care, etc )  - Arrange for interpretive services to assist at discharge as needed  - Refer to Case Management Department for coordinating discharge planning if the patient needs post-hospital services based on physician/advanced practitioner order or complex needs related to functional status, cognitive ability, or social support system  Outcome: Progressing     Problem: Knowledge Deficit  Goal: Patient/family/caregiver demonstrates understanding of disease process, treatment plan, medications, and discharge instructions  Description: Complete learning assessment and assess knowledge base    Interventions:  - Provide teaching at level of understanding  - Provide teaching via preferred learning methods  Outcome: Progressing     Problem: NEUROSENSORY - ADULT  Goal: Achieves stable or improved neurological status  Description: INTERVENTIONS  - Monitor and report changes in neurological status  - Monitor vital signs such as temperature, blood pressure, glucose, and any other labs ordered   - Initiate measures to prevent increased intracranial pressure  - Monitor for seizure activity and implement precautions if appropriate      Outcome: Progressing  Goal: Achieves maximal functionality and self care  Description: INTERVENTIONS  - Monitor swallowing and airway patency with patient fatigue and changes in neurological status  - Encourage and assist patient to increase activity and self care     - Encourage visually impaired, hearing impaired and aphasic patients to use assistive/communication devices  Outcome: Progressing     Problem: CARDIOVASCULAR - ADULT  Goal: Maintains optimal cardiac output and hemodynamic stability  Description: INTERVENTIONS:  - Monitor I/O, vital signs and rhythm  - Monitor for S/S and trends of decreased cardiac output  - Administer and titrate ordered vasoactive medications to optimize hemodynamic stability  - Assess quality of pulses, skin color and temperature  - Assess for signs of decreased coronary artery perfusion  - Instruct patient to report change in severity of symptoms  Outcome: Progressing  Goal: Absence of cardiac dysrhythmias or at baseline rhythm  Description: INTERVENTIONS:  - Continuous cardiac monitoring, vital signs, obtain 12 lead EKG if ordered  - Administer antiarrhythmic and heart rate control medications as ordered  - Monitor electrolytes and administer replacement therapy as ordered  Outcome: Progressing     Problem: RESPIRATORY - ADULT  Goal: Achieves optimal ventilation and oxygenation  Description: INTERVENTIONS:  - Assess for changes in respiratory status  - Assess for changes in mentation and behavior  - Position to facilitate oxygenation and minimize respiratory effort  - Oxygen administered by appropriate delivery if ordered  - Initiate smoking cessation education as indicated  - Encourage broncho-pulmonary hygiene including cough, deep breathe, Incentive Spirometry  - Assess the need for suctioning and aspirate as needed  - Assess and instruct to report SOB or any respiratory difficulty  - Respiratory Therapy support as indicated  Outcome: Progressing     Problem: GASTROINTESTINAL - ADULT  Goal: Minimal or absence of nausea and/or vomiting  Description: INTERVENTIONS:  - Administer IV fluids if ordered to ensure adequate hydration  - Maintain NPO status until nausea and vomiting are resolved  - Nasogastric tube if ordered  - Administer ordered antiemetic medications as needed  - Provide nonpharmacologic comfort measures as appropriate  - Advance diet as tolerated, if ordered  - Consider nutrition services referral to assist patient with adequate nutrition and appropriate food choices  Outcome: Progressing  Goal: Maintains or returns to baseline bowel function  Description: INTERVENTIONS:  - Assess bowel function  - Encourage oral fluids to ensure adequate hydration  - Administer IV fluids if ordered to ensure adequate hydration  - Administer ordered medications as needed  - Encourage mobilization and activity  - Consider nutritional services referral to assist patient with adequate nutrition and appropriate food choices  Outcome: Progressing  Goal: Maintains adequate nutritional intake  Description: INTERVENTIONS:  - Monitor percentage of each meal consumed  - Identify factors contributing to decreased intake, treat as appropriate  - Assist with meals as needed  - Monitor I&O, weight, and lab values if indicated  - Obtain nutrition services referral as needed  Outcome: Progressing  Goal: Oral mucous membranes remain intact  Description: INTERVENTIONS  - Assess oral mucosa and hygiene practices  - Implement preventative oral hygiene regimen  - Implement oral medicated treatments as ordered  - Initiate Nutrition services referral as needed  Outcome: Progressing     Problem: GENITOURINARY - ADULT  Goal: Maintains or returns to baseline urinary function  Description: INTERVENTIONS:  - Assess urinary function  - Encourage oral fluids to ensure adequate hydration if ordered  - Administer IV fluids as ordered to ensure adequate hydration  - Administer ordered medications as needed  - Offer frequent toileting  - Follow urinary retention protocol if ordered  Outcome: Progressing  Goal: Absence of urinary retention  Description: INTERVENTIONS:  - Assess patients ability to void and empty bladder  - Monitor I/O  - Bladder scan as needed  - Discuss with physician/AP medications to alleviate retention as needed  - Discuss catheterization for long term situations as appropriate  Outcome: Progressing     Problem: METABOLIC, FLUID AND ELECTROLYTES - ADULT  Goal: Electrolytes maintained within normal limits  Description: INTERVENTIONS:  - Monitor labs and assess patient for signs and symptoms of electrolyte imbalances  - Administer electrolyte replacement as ordered  - Monitor response to electrolyte replacements, including repeat lab results as appropriate  - Instruct patient on fluid and nutrition as appropriate  Outcome: Progressing  Goal: Fluid balance maintained  Description: INTERVENTIONS:  - Monitor labs   - Monitor I/O and WT  - Instruct patient on fluid and nutrition as appropriate  - Assess for signs & symptoms of volume excess or deficit  Outcome: Progressing  Goal: Glucose maintained within target range  Description: INTERVENTIONS:  - Monitor Blood Glucose as ordered  - Assess for signs and symptoms of hyperglycemia and hypoglycemia  - Administer ordered medications to maintain glucose within target range  - Assess nutritional intake and initiate nutrition service referral as needed  Outcome: Progressing     Problem: HEMATOLOGIC - ADULT  Goal: Maintains hematologic stability  Description: INTERVENTIONS  - Assess for signs and symptoms of bleeding or hemorrhage  - Monitor labs  - Administer supportive blood products/factors as ordered and appropriate  Outcome: Progressing     Problem: MUSCULOSKELETAL - ADULT  Goal: Maintain or return mobility to safest level of function  Description: INTERVENTIONS:  - Assess patient's ability to carry out ADLs; assess patient's baseline for ADL function and identify physical deficits which impact ability to perform ADLs (bathing, care of mouth/teeth, toileting, grooming, dressing, etc )  - Assess/evaluate cause of self-care deficits   - Assess range of motion  - Assess patient's mobility  - Assess patient's need for assistive devices and provide as appropriate  - Encourage maximum independence but intervene and supervise when necessary  - Involve family in performance of ADLs  - Assess for home care needs following discharge   - Consider OT consult to assist with ADL evaluation and planning for discharge  - Provide patient education as appropriate  Outcome: Progressing  Goal: Maintain proper alignment of affected body part  Description: INTERVENTIONS:  - Support, maintain and protect limb and body alignment  - Provide patient/ family with appropriate education  Outcome: Progressing     Problem: Potential for Falls  Goal: Patient will remain free of falls  Description: INTERVENTIONS:  - Educate patient/family on patient safety including physical limitations  - Instruct patient to call for assistance with activity   - Consult OT/PT to assist with strengthening/mobility   - Keep Call bell within reach  - Keep bed low and locked with side rails adjusted as appropriate  - Keep care items and personal belongings within reach  - Initiate and maintain comfort rounds  - Make Fall Risk Sign visible to staff  - Apply yellow socks and bracelet for high fall risk patients  - Consider moving patient to room near nurses station  Outcome: Progressing

## 2021-10-29 NOTE — OP NOTE
OPERATIVE REPORT  PATIENT NAMEMarcolin Calderon    :  1990  MRN: [id number]  Pt Location: [facility] OR ROOM 06    SURGERY DATE: 10/29/2021    Surgeon(s) and Role:     * Silva Costa MD - Primary     * Radha Stafford PA-C - Assisting    Preop Diagnosis:  Acute appendicitis [K35 80]    Post-Op Diagnosis Codes:     * Acute appendicitis [K35 80]    Procedure(s) (LRB):  APPENDECTOMY LAPAROSCOPIC, POSSIBLE OPEN (N/A)    Specimen(s):  ID Type Source Tests Collected by Time Destination   1 : appendix Tissue Appendix TISSUE EXAM Silva Costa MD 10/29/2021 1027        Estimated Blood Loss:   Minimal    Drains:  * No LDAs found *    Anesthesia Type:   General    Operative Indications:  Acute appendicitis [K35 80]      Operative Findings:  Dilated inflamed appendix without evidence of perforation    Complications:   None    Procedure and Technique:  Patient was identified in the preoperative holding area and taken back to the operating room  The patient was positioned supine on the operating room table  General anesthesia was then induced and the patient was prepped and draped in the standard sterile surgical fashion  Preoperative time-out was held confirming the correct patient, correct procedure and that all necessary equipment and personnel were present within the operating room  Preoperative antibiotics were administered prior to incision  Incision was made just below the umbilicus with a scalpel and a Veress needle was inserted into the abdomen  The abdomen was insufflated to 15 mmHg  Veress needle was then removed and a 5 mm port placed through this incision  Laparoscope was inserted into the port  Intra-abdominal contents were inspected and there was no trauma from port or needle placement  A 5 mm port was then placed in the suprapubic region and a 12 mm port in the left lower quadrant under direct visualization in similar fashion     Atraumatic graspers were used to bluntly manipulate the bowel in the right lower quadrant, identifying the cecum and the terminal ileum  The ileal rudder was grasped and the appendix was identified  The appendix was dilated and firm with inflammation the mid appendix  The appendix was grasped with an atraumatic grasper and manipulated such that the mesoappendix was accessible  The Harmonic scalpel was used to dissect through the mesoappendix down to the appendix base  The appendix base was free of any inflammation  An Endo-LAURA stapler with a white load was then used to transect the appendix at the base  The appendix then placed into an Endo-Catch bag and removed from the abdomen  The field was irrigated and suctioned  The staple line was examined and was intact and hemostatic  The 12 mm port was then removed and an 0 vicryl suture on a suture closure device was used to close the fascia  The remaining trocars were then removed and the abdomen was allowed to desufflate  Local anesthetic was injected along each of the port sites  4-0 Monocryl suture was then used to close the skin in subcuticular fashion  Surgical glue was then applied  The patient was then awoken from general anesthesia and taken to the postoperative Care Unit  in good condition  All counts were correct at the end of the case       I was present for the entire procedure, A qualified resident physician was not available and A physician assistant was required during the procedure for retraction tissue handling,dissection and suturing    Patient Disposition:  PACU  and hemodynamically stable    SIGNATURE: Anuj Case MD  DATE: October 29, 2021  TIME: 11:05 AM

## 2021-10-30 VITALS
BODY MASS INDEX: 38.27 KG/M2 | RESPIRATION RATE: 17 BRPM | SYSTOLIC BLOOD PRESSURE: 129 MMHG | OXYGEN SATURATION: 95 % | TEMPERATURE: 98.3 F | DIASTOLIC BLOOD PRESSURE: 72 MMHG | HEIGHT: 71 IN | WEIGHT: 273.37 LBS | HEART RATE: 78 BPM

## 2021-10-30 PROCEDURE — NC001 PR NO CHARGE: Performed by: SURGERY

## 2021-10-30 PROCEDURE — 99024 POSTOP FOLLOW-UP VISIT: CPT | Performed by: SURGERY

## 2021-10-30 RX ADMIN — ACETAMINOPHEN 975 MG: 325 TABLET, FILM COATED ORAL at 15:13

## 2021-10-30 RX ADMIN — HEPARIN SODIUM 5000 UNITS: 5000 INJECTION INTRAVENOUS; SUBCUTANEOUS at 05:20

## 2021-10-30 RX ADMIN — ACETAMINOPHEN 975 MG: 325 TABLET, FILM COATED ORAL at 08:06

## 2021-10-30 RX ADMIN — METHOCARBAMOL 500 MG: 500 TABLET ORAL at 15:13

## 2021-10-30 RX ADMIN — HYDROMORPHONE HYDROCHLORIDE 1 MG: 1 INJECTION, SOLUTION INTRAMUSCULAR; INTRAVENOUS; SUBCUTANEOUS at 09:31

## 2021-10-30 RX ADMIN — HEPARIN SODIUM 5000 UNITS: 5000 INJECTION INTRAVENOUS; SUBCUTANEOUS at 15:13

## 2021-10-30 RX ADMIN — METHOCARBAMOL 500 MG: 500 TABLET ORAL at 03:53

## 2021-10-30 RX ADMIN — ACETAMINOPHEN 975 MG: 325 TABLET, FILM COATED ORAL at 03:53

## 2021-10-30 RX ADMIN — OXYCODONE HYDROCHLORIDE 10 MG: 10 TABLET ORAL at 08:06

## 2021-10-30 RX ADMIN — METHOCARBAMOL 500 MG: 500 TABLET ORAL at 08:07

## 2021-10-30 NOTE — PLAN OF CARE
Problem: Nutrition/Hydration-ADULT  Goal: Nutrient/Hydration intake appropriate for improving, restoring or maintaining nutritional needs  Description: Monitor and assess patient's nutrition/hydration status for malnutrition  Collaborate with interdisciplinary team and initiate plan and interventions as ordered  Monitor patient's weight and dietary intake as ordered or per policy  Utilize nutrition screening tool and intervene as necessary  Determine patient's food preferences and provide high-protein, high-caloric foods as appropriate       INTERVENTIONS:  - Monitor oral intake, urinary output, labs, and treatment plans  - Assess nutrition and hydration status and recommend course of action  - Evaluate amount of meals eaten  - Assist patient with eating if necessary   - Allow adequate time for meals  - Recommend/ encourage appropriate diets, oral nutritional supplements, and vitamin/mineral supplements  - Order, calculate, and assess calorie counts as needed  - Recommend, monitor, and adjust tube feedings and TPN/PPN based on assessed needs  - Assess need for intravenous fluids  - Provide specific nutrition/hydration education as appropriate  - Include patient/family/caregiver in decisions related to nutrition  Outcome: Progressing     Problem: PAIN - ADULT  Goal: Verbalizes/displays adequate comfort level or baseline comfort level  Description: Interventions:  - Encourage patient to monitor pain and request assistance  - Assess pain using appropriate pain scale  - Administer analgesics based on type and severity of pain and evaluate response  - Implement non-pharmacological measures as appropriate and evaluate response  - Consider cultural and social influences on pain and pain management  - Notify physician/advanced practitioner if interventions unsuccessful or patient reports new pain  Outcome: Progressing     Problem: INFECTION - ADULT  Goal: Absence or prevention of progression during hospitalization  Description: INTERVENTIONS:  - Assess and monitor for signs and symptoms of infection  - Monitor lab/diagnostic results  - Monitor all insertion sites, i e  indwelling lines, tubes, and drains  - Monitor endotracheal if appropriate and nasal secretions for changes in amount and color  - Anthony appropriate cooling/warming therapies per order  - Administer medications as ordered  - Instruct and encourage patient and family to use good hand hygiene technique  - Identify and instruct in appropriate isolation precautions for identified infection/condition  Outcome: Progressing     Problem: SAFETY ADULT  Goal: Patient will remain free of falls  Description: INTERVENTIONS:  - Educate patient/family on patient safety including physical limitations  - Instruct patient to call for assistance with activity   - Consult OT/PT to assist with strengthening/mobility   - Keep Call bell within reach  - Keep bed low and locked with side rails adjusted as appropriate  - Keep care items and personal belongings within reach  - Initiate and maintain comfort rounds  - Make Fall Risk Sign visible to staff  - Offer Toileting every Hours, in advance of need  - Initiate/Maintain alarm  - Obtain necessary fall risk management equipment:   - Apply yellow socks and bracelet for high fall risk patients  - Consider moving patient to room near nurses station  Outcome: Progressing  Goal: Maintain or return to baseline ADL function  Description: INTERVENTIONS:  -  Assess patient's ability to carry out ADLs; assess patient's baseline for ADL function and identify physical deficits which impact ability to perform ADLs (bathing, care of mouth/teeth, toileting, grooming, dressing, etc )  - Assess/evaluate cause of self-care deficits   - Assess range of motion  - Assess patient's mobility; develop plan if impaired  - Assess patient's need for assistive devices and provide as appropriate  - Encourage maximum independence but intervene and supervise when necessary  - Involve family in performance of ADLs  - Assess for home care needs following discharge   - Consider OT consult to assist with ADL evaluation and planning for discharge  - Provide patient education as appropriate  Outcome: Progressing  Goal: Maintains/Returns to pre admission functional level  Description: INTERVENTIONS:  - Perform BMAT or MOVE assessment daily    - Set and communicate daily mobility goal to care team and patient/family/caregiver  - Collaborate with rehabilitation services on mobility goals if consulted  - Perform Range of Motion  times a day  - Reposition patient every  hours  - Dangle patient  times a day  - Stand patient  times a day  - Ambulate patient times a day  - Out of bed to chair times a day   - Out of bed for meals  times a day  - Out of bed for toileting  - Record patient progress and toleration of activity level   Outcome: Progressing     Problem: DISCHARGE PLANNING  Goal: Discharge to home or other facility with appropriate resources  Description: INTERVENTIONS:  - Identify barriers to discharge w/patient and caregiver  - Arrange for needed discharge resources and transportation as appropriate  - Identify discharge learning needs (meds, wound care, etc )  - Arrange for interpretive services to assist at discharge as needed  - Refer to Case Management Department for coordinating discharge planning if the patient needs post-hospital services based on physician/advanced practitioner order or complex needs related to functional status, cognitive ability, or social support system  Outcome: Progressing     Problem: Knowledge Deficit  Goal: Patient/family/caregiver demonstrates understanding of disease process, treatment plan, medications, and discharge instructions  Description: Complete learning assessment and assess knowledge base    Interventions:  - Provide teaching at level of understanding  - Provide teaching via preferred learning methods  Outcome: Progressing     Problem: NEUROSENSORY - ADULT  Goal: Achieves stable or improved neurological status  Description: INTERVENTIONS  - Monitor and report changes in neurological status  - Monitor vital signs such as temperature, blood pressure, glucose, and any other labs ordered   - Initiate measures to prevent increased intracranial pressure  - Monitor for seizure activity and implement precautions if appropriate      Outcome: Progressing  Goal: Achieves maximal functionality and self care  Description: INTERVENTIONS  - Monitor swallowing and airway patency with patient fatigue and changes in neurological status  - Encourage and assist patient to increase activity and self care     - Encourage visually impaired, hearing impaired and aphasic patients to use assistive/communication devices  Outcome: Progressing     Problem: CARDIOVASCULAR - ADULT  Goal: Maintains optimal cardiac output and hemodynamic stability  Description: INTERVENTIONS:  - Monitor I/O, vital signs and rhythm  - Monitor for S/S and trends of decreased cardiac output  - Administer and titrate ordered vasoactive medications to optimize hemodynamic stability  - Assess quality of pulses, skin color and temperature  - Assess for signs of decreased coronary artery perfusion  - Instruct patient to report change in severity of symptoms  Outcome: Progressing  Goal: Absence of cardiac dysrhythmias or at baseline rhythm  Description: INTERVENTIONS:  - Continuous cardiac monitoring, vital signs, obtain 12 lead EKG if ordered  - Administer antiarrhythmic and heart rate control medications as ordered  - Monitor electrolytes and administer replacement therapy as ordered  Outcome: Progressing     Problem: RESPIRATORY - ADULT  Goal: Achieves optimal ventilation and oxygenation  Description: INTERVENTIONS:  - Assess for changes in respiratory status  - Assess for changes in mentation and behavior  - Position to facilitate oxygenation and minimize respiratory effort  - Oxygen administered by appropriate delivery if ordered  - Initiate smoking cessation education as indicated  - Encourage broncho-pulmonary hygiene including cough, deep breathe, Incentive Spirometry  - Assess the need for suctioning and aspirate as needed  - Assess and instruct to report SOB or any respiratory difficulty  - Respiratory Therapy support as indicated  Outcome: Progressing     Problem: GASTROINTESTINAL - ADULT  Goal: Minimal or absence of nausea and/or vomiting  Description: INTERVENTIONS:  - Administer IV fluids if ordered to ensure adequate hydration  - Maintain NPO status until nausea and vomiting are resolved  - Nasogastric tube if ordered  - Administer ordered antiemetic medications as needed  - Provide nonpharmacologic comfort measures as appropriate  - Advance diet as tolerated, if ordered  - Consider nutrition services referral to assist patient with adequate nutrition and appropriate food choices  Outcome: Progressing  Goal: Maintains or returns to baseline bowel function  Description: INTERVENTIONS:  - Assess bowel function  - Encourage oral fluids to ensure adequate hydration  - Administer IV fluids if ordered to ensure adequate hydration  - Administer ordered medications as needed  - Encourage mobilization and activity  - Consider nutritional services referral to assist patient with adequate nutrition and appropriate food choices  Outcome: Progressing  Goal: Maintains adequate nutritional intake  Description: INTERVENTIONS:  - Monitor percentage of each meal consumed  - Identify factors contributing to decreased intake, treat as appropriate  - Assist with meals as needed  - Monitor I&O, weight, and lab values if indicated  - Obtain nutrition services referral as needed  Outcome: Progressing  Goal: Oral mucous membranes remain intact  Description: INTERVENTIONS  - Assess oral mucosa and hygiene practices  - Implement preventative oral hygiene regimen  - Implement oral medicated treatments as ordered  - Initiate Nutrition services referral as needed  Outcome: Progressing     Problem: GENITOURINARY - ADULT  Goal: Maintains or returns to baseline urinary function  Description: INTERVENTIONS:  - Assess urinary function  - Encourage oral fluids to ensure adequate hydration if ordered  - Administer IV fluids as ordered to ensure adequate hydration  - Administer ordered medications as needed  - Offer frequent toileting  - Follow urinary retention protocol if ordered  Outcome: Progressing  Goal: Absence of urinary retention  Description: INTERVENTIONS:  - Assess patients ability to void and empty bladder  - Monitor I/O  - Bladder scan as needed  - Discuss with physician/AP medications to alleviate retention as needed  - Discuss catheterization for long term situations as appropriate  Outcome: Progressing     Problem: METABOLIC, FLUID AND ELECTROLYTES - ADULT  Goal: Electrolytes maintained within normal limits  Description: INTERVENTIONS:  - Monitor labs and assess patient for signs and symptoms of electrolyte imbalances  - Administer electrolyte replacement as ordered  - Monitor response to electrolyte replacements, including repeat lab results as appropriate  - Instruct patient on fluid and nutrition as appropriate  Outcome: Progressing  Goal: Fluid balance maintained  Description: INTERVENTIONS:  - Monitor labs   - Monitor I/O and WT  - Instruct patient on fluid and nutrition as appropriate  - Assess for signs & symptoms of volume excess or deficit  Outcome: Progressing  Goal: Glucose maintained within target range  Description: INTERVENTIONS:  - Monitor Blood Glucose as ordered  - Assess for signs and symptoms of hyperglycemia and hypoglycemia  - Administer ordered medications to maintain glucose within target range  - Assess nutritional intake and initiate nutrition service referral as needed  Outcome: Progressing     Problem: SKIN/TISSUE INTEGRITY - ADULT  Goal: Skin Integrity remains intact(Skin Breakdown Prevention)  Description: Assess:  -Perform Louis assessment every   -Clean and moisturize skin every   -Inspect skin when repositioning, toileting, and assisting with ADLS  -Assess under medical devices such as  every   -Assess extremities for adequate circulation and sensation     Bed Management:  -Have minimal linens on bed & keep smooth, unwrinkled  -Change linens as needed when moist or perspiring  -Avoid sitting or lying in one position for more than  hours while in bed  -Keep HOB at degrees     Toileting:  -Offer bedside commode  -Assess for incontinence every   -Use incontinent care products after each incontinent episode such as     Activity:  -Mobilize patient times a day  -Encourage activity and walks on unit  -Encourage or provide ROM exercises   -Turn and reposition patient every Hours  -Use appropriate equipment to lift or move patient in bed  -Instruct/ Assist with weight shifting every when out of bed in chair  -Consider limitation of chair time hour intervals    Skin Care:  -Avoid use of baby powder, tape, friction and shearing, hot water or constrictive clothing  -Relieve pressure over bony prominences using   -Do not massage red bony areas    Next Steps:  -Teach patient strategies to minimize risks such as   -Consider consults to  interdisciplinary teams such as   Outcome: Progressing  Goal: Incision(s), wounds(s) or drain site(s) healing without S/S of infection  Description: INTERVENTIONS  - Assess and document dressing, incision, wound bed, drain sites and surrounding tissue  - Provide patient and family education  - Perform skin care/dressing changes every   Outcome: Progressing     Problem: HEMATOLOGIC - ADULT  Goal: Maintains hematologic stability  Description: INTERVENTIONS  - Assess for signs and symptoms of bleeding or hemorrhage  - Monitor labs  - Administer supportive blood products/factors as ordered and appropriate  Outcome: Progressing     Problem: MUSCULOSKELETAL - ADULT  Goal: Maintain or return mobility to safest level of function  Description: INTERVENTIONS:  - Assess patient's ability to carry out ADLs; assess patient's baseline for ADL function and identify physical deficits which impact ability to perform ADLs (bathing, care of mouth/teeth, toileting, grooming, dressing, etc )  - Assess/evaluate cause of self-care deficits   - Assess range of motion  - Assess patient's mobility  - Assess patient's need for assistive devices and provide as appropriate  - Encourage maximum independence but intervene and supervise when necessary  - Involve family in performance of ADLs  - Assess for home care needs following discharge   - Consider OT consult to assist with ADL evaluation and planning for discharge  - Provide patient education as appropriate  Outcome: Progressing  Goal: Maintain proper alignment of affected body part  Description: INTERVENTIONS:  - Support, maintain and protect limb and body alignment  - Provide patient/ family with appropriate education  Outcome: Progressing     Problem: Potential for Falls  Goal: Patient will remain free of falls  Description: INTERVENTIONS:  - Educate patient/family on patient safety including physical limitations  - Instruct patient to call for assistance with activity   - Consult OT/PT to assist with strengthening/mobility   - Keep Call bell within reach  - Keep bed low and locked with side rails adjusted as appropriate  - Keep care items and personal belongings within reach  - Initiate and maintain comfort rounds  - Make Fall Risk Sign visible to staff  - Offer Toileting every Hours, in advance of need  - Initiate/Maintain alarm  - Obtain necessary fall risk management equipment:   - Apply yellow socks and bracelet for high fall risk patients  - Consider moving patient to room near nurses station  Outcome: Progressing

## 2021-10-30 NOTE — PROGRESS NOTES
Progress Note - General Surgery   [facility] 32 y o  male MRN: [id number]  Unit/Bed#: E5 -01 Encounter: [id number]    Assessment:  Patient is a 32 y o  male who presented with acute appendicitis, now status post laparoscopic appendectomy on 10/29/POD1  Stayed overnight due to pain control issues    Afebrile,VSS    Abdomen soft, distended, less tender, incisions clean, dry and intact    Plan:  Diet as tolerated  DC this am  Encourage out of bed and ambulation  DVT px    Subjective/Objective     Subjective:   No acute events overnight  Objective:    Blood pressure 144/78, pulse 67, temperature 98 6 °F (37 °C), resp  rate 16, height 5' 11" (1 803 m), weight 124 kg (273 lb 5 9 oz), SpO2 92 %  ,Body mass index is 38 13 kg/m²  Intake/Output Summary (Last 24 hours) at 10/30/2021 0529  Last data filed at 10/30/2021 7359  Gross per 24 hour   Intake 2760 ml   Output 3 ml   Net 2757 ml       Invasive Devices     Peripheral Intravenous Line            Peripheral IV 10/28/21 Left Antecubital 1 day                Physical Exam  Constitutional:       General: He is not in acute distress  Appearance: He is not ill-appearing or toxic-appearing  HENT:      Head: Normocephalic and atraumatic  Mouth/Throat:      Mouth: Mucous membranes are moist    Eyes:      Extraocular Movements: Extraocular movements intact  Cardiovascular:      Rate and Rhythm: Normal rate and regular rhythm  Pulmonary:      Effort: Pulmonary effort is normal  No respiratory distress  Abdominal:      General: There is no distension  Palpations: Abdomen is soft  Tenderness: There is no abdominal tenderness  There is no guarding or rebound  Comments: Incisions clean, dry and intact     Musculoskeletal:      Cervical back: Normal range of motion  Comments: SCD's in place   Skin:     General: Skin is warm and dry  Neurological:      Mental Status: He is alert and oriented to person, place, and time   Mental status is at baseline  Psychiatric:         Mood and Affect: Mood normal          Behavior: Behavior normal          Thought Content:  Thought content normal          Judgment: Judgment normal              Results from last 7 days   Lab Units 10/29/21  0438 10/28/21  1721   WBC Thousand/uL 15 00* 21 40*   HEMOGLOBIN g/dL 14 6 15 3   HEMATOCRIT % 43 4 45 7   PLATELETS Thousands/uL 313 348     Results from last 7 days   Lab Units 10/29/21  0438 10/28/21  1721   POTASSIUM mmol/L 3 5 3 7   CHLORIDE mmol/L 101 103   CO2 mmol/L 28 27   BUN mg/dL 10 12   CREATININE mg/dL 1 02 1 29   CALCIUM mg/dL 9 0 9 4

## 2021-10-30 NOTE — DISCHARGE SUMMARY
Discharge Summary    [facility] 32 y o  male MRN: [id number]    Unit/Bed#: E5 -01 Encounter: [id number]    Admission Date: 10/28/2021     Discharge Date:/10/30/2021    Attending: Denilson Moe MD    Consultants: none    Admitting Diagnosis: Abdominal pain [R10 9]  Acute appendicitis [K35 80]    Principle Diagnosis: same as above    Secondary Diagnosis:  Past Medical History:   Diagnosis Date    Chronic pain     GERD (gastroesophageal reflux disease)     Hypertension     Manic depressive disorder ([facility] [address] )      Past Surgical History:   Procedure Laterality Date    NO PAST SURGERIES          Procedures Performed: Procedure(s):  APPENDECTOMY LAPAROSCOPIC, POSSIBLE OPEN    Imaging:Procedure: CT abdomen pelvis with contrast    Result Date: 10/28/2021  Narrative: CT ABDOMEN AND PELVIS WITH IV CONTRAST INDICATION: Right lower quadrant pain, appendicitis suspected  COMPARISON:  None  TECHNIQUE:  CT examination of the abdomen and pelvis was performed  Axial, sagittal, and coronal 2D reformatted images were created from the source data and submitted for interpretation  Radiation dose length product (DLP) for this visit:  830 mGy-cm   This examination, like all CT scans performed in the [facility], was performed utilizing techniques to minimize radiation dose exposure, including the use of iterative reconstruction and automated exposure control  IV Contrast:  100 mL of iohexol (OMNIPAQUE) Enteric Contrast:  Enteric contrast was not administered  FINDINGS: ABDOMEN LOWER CHEST:  Peripherally located 4 mm (2, 4) right lower lobe pulmonary nodule  Focal subpleural  nodular opacity (2, 9)  There is ipsilateral regional hypoventilatory changes in the subpleural region  LIVER/BILIARY TREE:  Unremarkable  GALLBLADDER:  No calcified gallstones  No pericholecystic inflammatory change  SPLEEN:  Unremarkable  PANCREAS:  Unremarkable  ADRENAL GLANDS:  Unremarkable  KIDNEYS/URETERS:  Unremarkable   No hydronephrosis  STOMACH AND BOWEL:  Unremarkable  APPENDIX:  Dilated and slightly thick-walled appendix (601, 62) without perforation or periappendiceal abscess  No appendicolith  Mild periappendiceal fat stranding and subcentimeter lymph nodes in the right lower quadrant mesentery  ABDOMINOPELVIC CAVITY:  No ascites  No pneumoperitoneum  No retroperitoneal lymphadenopathy  VESSELS:  Unremarkable for patient's age  PELVIS REPRODUCTIVE ORGANS:  Unremarkable for patient's age  URINARY BLADDER:  Unremarkable  ABDOMINAL WALL/INGUINAL REGIONS:  Unremarkable  OSSEOUS STRUCTURES:  No acute fracture or destructive osseous lesion  Mild scoliosis, possibly positional or due to splinting  Impression: Findings consistent with acute appendicitis  No findings to suggest appendiceal rupture or periappendiceal abscess  I personally discussed this study with Bita Quintana on 10/28/2021 at 8:55 PM  Approximate 4 mm right lower lobe pulmonary nodule  Based on current Fleischner Society 2017 Guidelines on incidental pulmonary nodule, no routine follow-up is needed if the patient is considered low risk for lung cancer  If the patient is considered high risk for lung cancer, 12 month follow-up non-contrast chest CT is recommended  Workstation performed: [id number]       Discharge Medications:  See after visit summary for reconciled discharge medications provided to patient and family  Brief HPI: 33 yo male presented w/acute appendicitis  Hospital Course: Dustni Priest is a 32 y o  male who presented 10/28/2021 per HPI and was taken to the OR for above procedure  Intraoperative findings included the following from operative report: Dilated inflamed appendix without evidence of perforation    The patient hospital course was complicated by pain control issues requiring overnight stay  Pt was treated with multimodal pain regimen and pain was better controlled   On POD 1, he was deemed appropriate for discharge    Patient was discharged on HD2/POD1  On the day of discharge, the patient was voiding spontaneously, ambulating at baseline, and pain was well controlled  The patient was sent home with prescriptions for pain  He understood all instructions for discharge  He was also given the names and numbers of the providers as well as instructions for follow up appointments  Condition at Discharge: good     Provisions for Follow-Up Care:  See after visit summary for information related to follow-up care and any pertinent home health orders  Disposition: Home    Discharge instructions/Information to patient and family:   See after visit summary for information provided to patient and family  Planned Readmission: No    Discharge Statement   I spent 25 minutes discharging the patient  This time was spent on the day of discharge  I had direct contact with the patient on the day of discharge  Additional documentation is required if more than 30 minutes were spent on discharge

## 2021-10-30 NOTE — DISCHARGE INSTRUCTIONS
[id number] [address]  [facility] Surgery Discharge Instructions      1  General: You will feel pulling sensations around the wound or funny aches and pains around the incisions  You may have some bruising or mild redness  This is normal  Even minor surgery is a change in your body and this is your bodys reaction to it  If you have had abdominal surgery, it may help to support the incision with a small pillow or blanket for comfort when moving or coughing  There may be some hardness surrounding your incision which is your body's normal reaction to surgery  This typically softens up over time  A heating pad or ice pack can also be beneficial in the post-op period  2  Wound care: Make sure to remove the overlying dressing/bandage in about 24 hours, unless instructed otherwise  You usually don't have to redress the wound after 24-48 hours, unless for comfort  Keep the incision clean and dry  Let air get to it  If this Steri-Strips fall off, just keep the wound clean  If there is surgical glue in place this will usually start to soften in around 2 weeks and will eventually dissolve or fall off with gentle scrubbing in the shower  3  Water: You may shower over the wound, unless there are drain tubes left in place  Do not bathe or use a pool or hot tub until cleared by the physician  Do not swim in a lake or ocean until cleared by your physician  You may shower right over the staples or Steri-Strips and packing dry when you are done  4  Activity: You may go up and down stairs, walk as much as you are comfortable, but walk at least 3 times each day  If you have had abdominal surgery, do not lift anything heavier than 15 pounds for at least 2-4 weeks, unless cleared by the doctor  Notes and paperwork for your job are typically filled out at your post-op appointment but if there is a time constraint please call the office for assistance if this is needed prior to your post-op check  5  Diet: You may resume a regular diet  If you had a same-day surgery or overnight stay surgery, you may wish to eat lightly for a few days: soups, crackers, and sandwiches  You may resume a regular diet when ready  6  Medications: Resume all of your previous medications, unless told otherwise by the doctor  Ibuprofen (Advil, Motrin, etc ) is usually ok unless specifically discouraged by your surgeon  400-600 mg of ibuprofen every 6 hours for post-surgical pain is an acceptable regimen with a maximum dosage of 2400 mg per day  Avoid ibuprofen if you are taking aspirin  If you have a bleeding disorder or have stomach issues, talk to your surgeon prior to use  Tylenol is ok, unless you are taking any narcotic pain medication containing Tylenol (such as Percocet, Darvocet, Vicodin, or anything containing acetaminophen)  Be cautious taking additional Tylenol if you're taking these medications as there is a maximum dosage of 4,000 mg of Tylenol per day  You do not need to take the narcotic pain medications unless you are having significant pain and discomfort  7  Driving: You will need someone to drive you home on the day of surgery  Do not drive or make any important decisions while on narcotic pain medication or 24 hours and after anesthesia or sedation for surgery  Generally, you may drive when you are off all narcotic pain medications  8  Upset Stomach: You may take Maalox, Tums, or similar items for an upset stomach  If your narcotic pain medication causes an upset stomach, do not take it on an empty stomach  Try taking it with at least some crackers or toast      9  Constipation: Patients often experience constipation after surgery due to anesthesia and pain medication  This is especially common after abdominal surgery  You may take over-the-counter medication for this, such as Metamucil, Senokot, Dulcolax, milk of magnesia, etc  You may take a suppository unless you have had anorectal surgery such as a procedure on your hemorrhoids   If you experience significant nausea or vomiting after abdominal surgery, call the office before trying any of these medications  10  Pain: You may be given a prescription for pain  This will be prescribed the day of surgery and sent to your pharmacy of choice  Take the pain medication as prescribed, only if you need it  Narcotic pain medications (such as anything containing hydrocodone or oxycodone) have many side effects such as nausea/vomiting, constipation, dizziness and respiratory depression  Do not drive when taking the pain medication  Do not take more than prescribed in the 4-6 hour time period  11  Sexual Activity: You may resume sexual activity when you feel ready and comfortable and your incision is sealed and healed without apparent infection risk  12  Urination: If you haven't urinated in 6 hours and are unable to urinate please call the office  If you are having pain and can not urinate you need to be evaluated by a physician and should go to the emergency room  Call the office: If you are experiencing any of the following, fevers above 101 5°, significant nausea or vomiting, if the wound develops drainage and/or has excessive redness around the wound, or if you have significant diarrhea or other worsening symptoms

## 2021-10-30 NOTE — QUICK NOTE
Postoperative Evaluation:    [id number] [address] [place] yo male s/p lap appy with pain control issues postoperatively  Pt complaining of cramping abdominal pain especially left sided  Denies n/v fevers, chills  Has urinated since surgery and has tolerated diet        Afebrile, VSS  Abdomen soft, distended, tender, incisions clean, dry and intact      Plan:  Diet as tolerated  Added robaxin for muscle spasms  Tylenol scheduled  Dvt px  Dc tomorrow if pain better controlled      Valeria Watson MD  8:52 PM  10/29/21

## 2021-11-01 ENCOUNTER — TRANSITIONAL CARE MANAGEMENT (OUTPATIENT)
Dept: FAMILY MEDICINE CLINIC | Facility: CLINIC | Age: 31
End: 2021-11-01

## 2021-11-02 ENCOUNTER — OFFICE VISIT (OUTPATIENT)
Dept: SURGERY | Facility: CLINIC | Age: 31
End: 2021-11-02

## 2021-11-02 VITALS — BODY MASS INDEX: 38.22 KG/M2 | HEART RATE: 90 BPM | HEIGHT: 71 IN | WEIGHT: 273 LBS | TEMPERATURE: 98 F

## 2021-11-02 DIAGNOSIS — G89.18 POST-OP PAIN: Primary | ICD-10-CM

## 2021-11-02 PROCEDURE — 99024 POSTOP FOLLOW-UP VISIT: CPT | Performed by: SURGERY

## 2021-11-02 RX ORDER — OMEPRAZOLE 20 MG/1
CAPSULE, DELAYED RELEASE ORAL
COMMUNITY
Start: 2021-05-12

## 2021-11-09 ENCOUNTER — OFFICE VISIT (OUTPATIENT)
Dept: SURGERY | Facility: CLINIC | Age: 31
End: 2021-11-09

## 2021-11-09 VITALS
SYSTOLIC BLOOD PRESSURE: 130 MMHG | WEIGHT: 273 LBS | HEART RATE: 88 BPM | HEIGHT: 71 IN | DIASTOLIC BLOOD PRESSURE: 70 MMHG | TEMPERATURE: 97 F | BODY MASS INDEX: 38.22 KG/M2

## 2021-11-09 DIAGNOSIS — G89.18 POST-OP PAIN: ICD-10-CM

## 2021-11-09 DIAGNOSIS — Z98.890 POST-OPERATIVE STATE: Primary | ICD-10-CM

## 2021-11-09 PROCEDURE — 99024 POSTOP FOLLOW-UP VISIT: CPT | Performed by: SURGERY

## 2022-02-02 ENCOUNTER — HOSPITAL ENCOUNTER (EMERGENCY)
Facility: HOSPITAL | Age: 32
Discharge: HOME | End: 2022-02-02
Attending: EMERGENCY MEDICINE
Payer: COMMERCIAL

## 2022-02-02 ENCOUNTER — APPOINTMENT (EMERGENCY)
Dept: RADIOLOGY | Facility: HOSPITAL | Age: 32
End: 2022-02-02
Payer: COMMERCIAL

## 2022-02-02 VITALS
HEIGHT: 71 IN | BODY MASS INDEX: 39.26 KG/M2 | OXYGEN SATURATION: 98 % | SYSTOLIC BLOOD PRESSURE: 132 MMHG | DIASTOLIC BLOOD PRESSURE: 85 MMHG | WEIGHT: 280.43 LBS | HEART RATE: 70 BPM | TEMPERATURE: 97.6 F | RESPIRATION RATE: 20 BRPM

## 2022-02-02 DIAGNOSIS — M54.50 ACUTE BILATERAL LOW BACK PAIN, UNSPECIFIED WHETHER SCIATICA PRESENT: Primary | ICD-10-CM

## 2022-02-02 PROCEDURE — 99283 EMERGENCY DEPT VISIT LOW MDM: CPT

## 2022-02-02 PROCEDURE — 99284 EMERGENCY DEPT VISIT MOD MDM: CPT | Performed by: PHYSICIAN ASSISTANT

## 2022-02-02 PROCEDURE — 72100 X-RAY EXAM L-S SPINE 2/3 VWS: CPT

## 2022-02-02 RX ORDER — KETOROLAC TROMETHAMINE 30 MG/ML
15 INJECTION, SOLUTION INTRAMUSCULAR; INTRAVENOUS ONCE
Status: COMPLETED | OUTPATIENT
Start: 2022-02-02 | End: 2022-02-02

## 2022-02-02 RX ORDER — CYCLOBENZAPRINE HCL 10 MG
10 TABLET ORAL 3 TIMES DAILY PRN
Qty: 9 TABLET | Refills: 0 | Status: SHIPPED | OUTPATIENT
Start: 2022-02-02 | End: 2022-02-05

## 2022-02-02 RX ORDER — LIDOCAINE 50 MG/G
1 PATCH TOPICAL ONCE
Status: DISCONTINUED | OUTPATIENT
Start: 2022-02-02 | End: 2022-02-02 | Stop reason: HOSPADM

## 2022-02-02 RX ADMIN — LIDOCAINE 1 PATCH: 50 PATCH CUTANEOUS at 01:54

## 2022-02-02 RX ADMIN — KETOROLAC TROMETHAMINE 15 MG: 30 INJECTION, SOLUTION INTRAMUSCULAR at 01:55

## 2022-02-02 NOTE — Clinical Note
Live Oscarkevin was seen and treated in our emergency department on 2/2/2022  Diagnosis:     Manish    He may return on this date: If you have any questions or concerns, please don't hesitate to call        Shwetha De DO    ______________________________           _______________          _______________  Hospital Representative                              Date                                Time

## 2022-02-02 NOTE — DISCHARGE INSTRUCTIONS
Please return if symptoms worsen or new symptoms develop  Follow up with comprehensive spine  Tylenol and ibuprofen for pain  Can alternate between the two every 4 hours  Take flexeril as prescribed, no driving or operating heavy machinery while taking this medication  Heat to area 20 min up to 4x a day  Can use over the counter lidocaine cream or lidocaine patch for pain

## 2022-02-02 NOTE — ED PROVIDER NOTES
History  Chief Complaint   Patient presents with    Back Pain     pt complains of lower back, neck, and L leg pain that started yesterday after a fall in his bathroom  pt states "i thought it would get better but it hasnt"      33 y/o male p/w b/l lower lumbar pain s/p fall Saturday  Patient states fell in bathroom and landed on buttocks  Patient states he thought he would be able to manage pain at home, has tried naprosyn and advil for pain w/o relief  Pain is improved with lying and sitting  Pain worsened with ambulation, standing, bending, twisting  Lumbar pain associated with pain down L leg on the outer thigh  Patient also have pain in upper back left side and down left arm, he states he caught himself on this side  Denies weakness, numbness, tingling, bowel/bladder incontinence, abdominal pain, fever  History provided by:  Patient   used: No    Back Pain  Location:  Lumbar spine  Quality:  Stabbing  Radiates to:  L knee, L thigh, L foot, L posterior upper leg and L shoulder  Pain severity:  Moderate  Pain is:  Same all the time  Onset quality:  Gradual  Duration:  3 days  Timing:  Constant  Progression:  Unchanged  Chronicity:  New  Context: falling    Relieved by:  Lying down  Worsened by:  Ambulation, bending, movement, twisting and standing  Ineffective treatments:  Ibuprofen  Associated symptoms: leg pain (Left)    Associated symptoms: no abdominal pain, no bladder incontinence, no bowel incontinence, no chest pain, no dysuria, no fever, no headaches, no numbness, no paresthesias, no perianal numbness, no tingling and no weakness            Prior to Admission Medications   Prescriptions Last Dose Informant Patient Reported?  Taking?   acetaminophen (TYLENOL) 500 mg tablet   No No   Sig: Take 1 tablet (500 mg total) by mouth every 6 (six) hours as needed for mild pain, moderate pain or headaches   hydrOXYzine HCL (ATARAX) 25 mg tablet   No No   Sig: Take 1 tablet (25 mg total) by mouth every 6 (six) hours   Patient taking differently: Take 50 mg by mouth daily    omeprazole (PriLOSEC) 20 mg delayed release capsule   Yes No   Sig: TAKE 1 CAPSULE BY MOUTH EVERY DAY FOR STOMACH OR REFLUX      Facility-Administered Medications: None       Past Medical History:   Diagnosis Date    Chronic pain     GERD (gastroesophageal reflux disease)     Hypertension     Manic depressive disorder (HCC)        Past Surgical History:   Procedure Laterality Date    APPENDECTOMY LAPAROSCOPIC N/A 10/29/2021    Procedure: APPENDECTOMY LAPAROSCOPIC;  Surgeon: Durga Oquendo MD;  Location: [facility];  Service: General    NO PAST SURGERIES         Family History   Problem Relation Age of Onset    Hypertension Mother     Diabetes Father      I have reviewed and agree with the history as documented  E-Cigarette/Vaping     E-Cigarette/Vaping Substances     Social History     Tobacco Use    Smoking status: Never Smoker    Smokeless tobacco: Never Used   Substance Use Topics    Alcohol use: No    Drug use: No       Review of Systems   Constitutional: Positive for activity change (painful ROM)  Negative for diaphoresis and fever  HENT: Negative for congestion, rhinorrhea and sore throat  Respiratory: Negative for cough, chest tightness and shortness of breath  Cardiovascular: Negative for chest pain, palpitations and leg swelling  Gastrointestinal: Negative for abdominal pain, bowel incontinence, diarrhea, nausea and vomiting  Genitourinary: Negative for bladder incontinence, dysuria, frequency and urgency  Musculoskeletal: Positive for back pain, gait problem (analgesic gait) and neck pain  Skin: Negative for color change and rash  Neurological: Negative for dizziness, tingling, tremors, weakness, light-headedness, numbness, headaches and paresthesias  All other systems reviewed and are negative  Physical Exam  Physical Exam  Vitals and nursing note reviewed     Constitutional: General: He is not in acute distress  Appearance: Normal appearance  He is well-developed and well-groomed  He is not ill-appearing  HENT:      Head: Normocephalic and atraumatic  Right Ear: External ear normal       Left Ear: External ear normal       Nose: Nose normal  No rhinorrhea  Eyes:      General: No scleral icterus  Extraocular Movements: Extraocular movements intact  Conjunctiva/sclera: Conjunctivae normal    Pulmonary:      Effort: Pulmonary effort is normal       Breath sounds: No stridor  Abdominal:      General: Bowel sounds are normal  There is no distension  Palpations: Abdomen is soft  Tenderness: There is no abdominal tenderness  There is no right CVA tenderness or left CVA tenderness  Musculoskeletal:         General: Tenderness present  No swelling or deformity  Cervical back: Normal range of motion  No swelling, edema, spasms, tenderness or bony tenderness  Pain with movement present  Normal range of motion  Thoracic back: Normal  No swelling, edema, deformity, tenderness or bony tenderness  Normal range of motion  Lumbar back: Tenderness present  No swelling, edema or bony tenderness  Decreased range of motion  Negative right straight leg raise test and negative left straight leg raise test         Back:       Right lower leg: No swelling, tenderness or bony tenderness  No edema  Left lower leg: No swelling, tenderness or bony tenderness  No edema  Legs:       Comments: TTP b/l paraspinal muscles  No bony tenderness cervical or lumbar spine  LLE non-tender to palpation  ROM limited due to pain  L straight leg raise with pain in lumbar spine, no radicular symptoms  Skin:     General: Skin is warm and dry  Findings: No bruising or erythema  Neurological:      General: No focal deficit present  Mental Status: He is alert and oriented to person, place, and time  GCS: GCS eye subscore is 4  GCS verbal subscore is 5  GCS motor subscore is 6  Sensory: Sensation is intact  No sensory deficit  Motor: Motor function is intact  No weakness or abnormal muscle tone  Coordination: Coordination is intact  Gait: Gait abnormal (analgesic)  Comments: 5/5 strength B/L LE flexion, extension at hip, knee, ankle  Sensation intact throughout  Psychiatric:         Mood and Affect: Mood normal          Behavior: Behavior normal  Behavior is cooperative  Vital Signs  ED Triage Vitals   Temperature Pulse Respirations Blood Pressure SpO2   02/02/22 0127 02/02/22 0127 02/02/22 0127 02/02/22 0127 02/02/22 0127   97 6 °F (36 4 °C) 70 20 132/85 98 %      Temp Source Heart Rate Source Patient Position - Orthostatic VS BP Location FiO2 (%)   02/02/22 0127 02/02/22 0127 -- -- --   Oral Monitor         Pain Score       02/02/22 0247       5           Vitals:    02/02/22 0127   BP: 132/85   Pulse: 70         Visual Acuity      ED Medications  Medications   lidocaine (LIDODERM) 5 % patch 1 patch (1 patch Topical Medication Applied 2/2/22 0154)   ketorolac (TORADOL) injection 15 mg (15 mg Intramuscular Given 2/2/22 0155)       Diagnostic Studies  Results Reviewed     None                 XR lumbar spine 2 or 3 views    (Results Pending)              Procedures  Procedures         ED Course  ED Course as of 02/02/22 0251   Wed Feb 02, 2022   0205 XR lumbar spine 2 or 3 views  No obvious bony deformity or fracture   0209 Toradol and lidocaine patch for pain  SBIRT 22yo+      Most Recent Value   SBIRT (24 yo +)    In order to provide better care to our patients, we are screening all of our patients for alcohol and drug use  Would it be okay to ask you these screening questions?  No Filed at: 02/02/2022 0132            MDM  Number of Diagnoses or Management Options  Acute bilateral low back pain, unspecified whether sciatica present: established and improving  Diagnosis management comments: B/l lower lumbar pain, L leg pain, neck and shoulder pain s/p fall Saturday  Pain improved with lying, worsened w/ movement  Denies weakness, numbness, tingling, bowel/bladder incontinence, abdominal pain, fever  No bony tenderness on exam, negative SLR, painful ROM, analgesic gait, TTP b/l lumbar paraspinal muscles  Plan: Lumbar x-ray: w/o acute bony deformity or fracture  Pain control with toradol and lidocaine patch here  Will order muscle relaxant outpatient, patient drove self here  Will order follow up with comprehensive [facility]  Heat to area as needed  Patient comfortable with plan of care and stable for d/c  Prior to discharge, the management plan was discussed in detail with the patient at bedside, we provided both verbal and written instructions  The patient (and any family present) verbalized understanding of the discharge instructions and warnings that would necessitate return to the Emergency Department  All questions were answered and patient was comfortable with the plan of care and discharged to home  The patient verbalized understanding of our discussion and plan of care, and agrees to return to the Emergency Department for concerns and progression of illness  Patient stable at discharge         Amount and/or Complexity of Data Reviewed  Tests in the radiology section of CPT®: ordered and reviewed  Independent visualization of images, tracings, or specimens: yes    Risk of Complications, Morbidity, and/or Mortality  Presenting problems: low  Diagnostic procedures: low  Management options: low    Patient Progress  Patient progress: stable      Disposition  Final diagnoses:   Acute bilateral low back pain, unspecified whether sciatica present     Time reflects when diagnosis was documented in both MDM as applicable and the Disposition within this note     Time User Action Codes Description Comment    2/2/2022  2:11 AM Sandy Cerda Add [A53 84] Acute bilateral low back pain, unspecified whether sciatica present       ED Disposition     ED Disposition Condition Date/Time Comment    Discharge Stable Wed Feb 2, 2022  2:10 AM [facility] discharge to home/self care  Follow-up Information     Follow up With Specialties Details Why Contact Info Additional Information    [facility] Physical Therapy Call today  [phone]          Discharge Medication List as of 2/2/2022  2:45 AM      START taking these medications    Details   cyclobenzaprine (FLEXERIL) 10 mg tablet Take 1 tablet (10 mg total) by mouth 3 (three) times a day as needed for muscle spasms for up to 3 days, Starting Wed 2/2/2022, Until Sat 2/5/2022 at 2359, Normal         CONTINUE these medications which have NOT CHANGED    Details   acetaminophen (TYLENOL) 500 mg tablet Take 1 tablet (500 mg total) by mouth every 6 (six) hours as needed for mild pain, moderate pain or headaches, Starting Mon 4/5/2021, Normal      hydrOXYzine HCL (ATARAX) 25 mg tablet Take 1 tablet (25 mg total) by mouth every 6 (six) hours, Starting Thu 4/15/2021, Print      omeprazole (PriLOSEC) 20 mg delayed release capsule TAKE 1 CAPSULE BY MOUTH EVERY DAY FOR STOMACH OR REFLUX, Historical Med             No discharge procedures on file      PDMP Review       Value Time User    PDMP Reviewed  Yes 10/5/2020  3:55 PM Veena Ponce PA-C          ED Provider  Electronically Signed by CHRIS Suárez, [place]  02/02/22 7333

## 2022-05-31 ENCOUNTER — HOSPITAL ENCOUNTER (EMERGENCY)
Facility: HOSPITAL | Age: 32
Discharge: HOME | End: 2022-05-31
Attending: EMERGENCY MEDICINE
Payer: COMMERCIAL

## 2022-05-31 VITALS
HEART RATE: 88 BPM | SYSTOLIC BLOOD PRESSURE: 161 MMHG | HEIGHT: 70 IN | TEMPERATURE: 98.8 F | RESPIRATION RATE: 18 BRPM | WEIGHT: 265 LBS | DIASTOLIC BLOOD PRESSURE: 93 MMHG | BODY MASS INDEX: 37.94 KG/M2 | OXYGEN SATURATION: 96 %

## 2022-05-31 DIAGNOSIS — F41.9 ANXIETY: Primary | ICD-10-CM

## 2022-05-31 LAB
ATRIAL RATE: 80 BPM
P AXIS: 35 DEGREES
PR INTERVAL: 154 MS
QRS AXIS: 12 DEGREES
QRSD INTERVAL: 78 MS
QT INTERVAL: 386 MS
QTC INTERVAL: 445 MS
T WAVE AXIS: 6 DEGREES
VENTRICULAR RATE: 80 BPM

## 2022-05-31 PROCEDURE — 93010 ELECTROCARDIOGRAM REPORT: CPT | Performed by: INTERNAL MEDICINE

## 2022-05-31 PROCEDURE — 93005 ELECTROCARDIOGRAM TRACING: CPT

## 2022-05-31 PROCEDURE — 99283 EMERGENCY DEPT VISIT LOW MDM: CPT

## 2022-05-31 PROCEDURE — 99284 EMERGENCY DEPT VISIT MOD MDM: CPT | Performed by: PHYSICIAN ASSISTANT

## 2022-05-31 RX ORDER — HYDROXYZINE HYDROCHLORIDE 25 MG/1
25 TABLET, FILM COATED ORAL ONCE
Status: COMPLETED | OUTPATIENT
Start: 2022-05-31 | End: 2022-05-31

## 2022-05-31 RX ADMIN — HYDROXYZINE HYDROCHLORIDE 25 MG: 25 TABLET, FILM COATED ORAL at 02:37

## 2022-05-31 NOTE — ED PROVIDER NOTES
History  Chief Complaint   Patient presents with    Anxiety     Pt was in police custody, started to have a panic attack, had chest pain  Normally takes prozac at 6 and did not take it today  Patient presents for an evaluation of a panic attack  States he has a history of anxiety  States he started having a panic attack when he was placed under arrest by [employer]  Started feeling anxious with intermittent chest pain since  Similar to prior anxiety attacks  Denies any shortness of breath, fevers, chills, cough, abdominal pain, nausea, vomiting  Did not take his anxiety medications tonight  Denies SI/ HI  No other complaints  Prior to Admission Medications   Prescriptions Last Dose Informant Patient Reported?  Taking?   acetaminophen (TYLENOL) 500 mg tablet   No No   Sig: Take 1 tablet (500 mg total) by mouth every 6 (six) hours as needed for mild pain, moderate pain or headaches   cyclobenzaprine (FLEXERIL) 10 mg tablet   No No   Sig: Take 1 tablet (10 mg total) by mouth 3 (three) times a day as needed for muscle spasms for up to 3 days   hydrOXYzine HCL (ATARAX) 25 mg tablet   No No   Sig: Take 1 tablet (25 mg total) by mouth every 6 (six) hours   Patient taking differently: Take 50 mg by mouth daily    omeprazole (PriLOSEC) 20 mg delayed release capsule   Yes No   Sig: TAKE 1 CAPSULE BY MOUTH EVERY DAY FOR STOMACH OR REFLUX      Facility-Administered Medications: None       Past Medical History:   Diagnosis Date    Chronic pain     GERD (gastroesophageal reflux disease)     Hypertension     Manic depressive disorder (HCC)        Past Surgical History:   Procedure Laterality Date    APPENDECTOMY LAPAROSCOPIC N/A 10/29/2021    Procedure: APPENDECTOMY LAPAROSCOPIC;  Surgeon: Carter Pinzon MD;  Location: [facility] OR;  Service: General    NO PAST SURGERIES         Family History   Problem Relation Age of Onset    Hypertension Mother     Diabetes Father      I have reviewed and agree with the history as documented  E-Cigarette/Vaping     E-Cigarette/Vaping Substances     Social History     Tobacco Use    Smoking status: Never Smoker    Smokeless tobacco: Never Used   Substance Use Topics    Alcohol use: No    Drug use: No       Review of Systems   Constitutional: Negative for chills and fever  HENT: Negative for congestion, ear pain and sore throat  Eyes: Negative for pain  Respiratory: Negative for cough and shortness of breath  Cardiovascular: Positive for chest pain  Gastrointestinal: Negative for abdominal pain, nausea and vomiting  Genitourinary: Negative for dysuria  Musculoskeletal: Negative for back pain  Skin: Negative for rash  Neurological: Negative for dizziness, weakness and numbness  Psychiatric/Behavioral: Negative for suicidal ideas  The patient is nervous/anxious  All other systems reviewed and are negative  Physical Exam  Physical Exam  Vitals reviewed  Constitutional:       General: He is not in acute distress  Appearance: He is well-developed  He is not diaphoretic  HENT:      Head: Normocephalic and atraumatic  Right Ear: External ear normal       Left Ear: External ear normal       Nose: Nose normal       Mouth/Throat:      Mouth: Mucous membranes are moist       Pharynx: Oropharynx is clear  Eyes:      Pupils: Pupils are equal, round, and reactive to light  Cardiovascular:      Rate and Rhythm: Normal rate and regular rhythm  Heart sounds: Normal heart sounds  Pulmonary:      Effort: Pulmonary effort is normal       Breath sounds: Normal breath sounds  Abdominal:      General: Bowel sounds are normal       Palpations: Abdomen is soft  Tenderness: There is no abdominal tenderness  Musculoskeletal:         General: Normal range of motion  Cervical back: Normal range of motion and neck supple  Skin:     General: Skin is warm and dry     Neurological:      Mental Status: He is alert and oriented to person, place, and time    Psychiatric:         Mood and Affect: Mood is anxious  Thought Content: Thought content does not include homicidal or suicidal ideation  Thought content does not include homicidal or suicidal plan  Vital Signs  ED Triage Vitals   Temperature Pulse Respirations Blood Pressure SpO2   05/31/22 0233 05/31/22 0233 05/31/22 0233 05/31/22 0233 05/31/22 0233   98 8 °F (37 1 °C) 93 20 (!) 172/111 96 %      Temp Source Heart Rate Source Patient Position - Orthostatic VS BP Location FiO2 (%)   05/31/22 0233 05/31/22 0253 05/31/22 0233 05/31/22 0233 --   Oral Monitor Sitting Right arm       Pain Score       --                  Vitals:    05/31/22 0233 05/31/22 0253   BP: (!) 172/111 161/93   Pulse: 93 88   Patient Position - Orthostatic VS: Sitting Sitting         Visual Acuity      ED Medications  Medications   hydrOXYzine HCL (ATARAX) tablet 25 mg (25 mg Oral Given 5/31/22 0237)       Diagnostic Studies  Results Reviewed     None                 No orders to display              Procedures  Procedures         ED Course  ED Course as of 05/31/22 0257   Tue May 31, 2022   0246 Procedure Note: EKG  Date/Time: 05/31/22 2:46 AM   Performed by: Cherelle Hernandez  Authorized by: Cherelle Hernandez  ECG interpreted by me, the ED Provider: yes   The EKG demonstrates:  Rate 80  Rhythm normal sinus  QTc 445  No ST elevations/depressions                                   SBIRT 22yo+    Flowsheet Row Most Recent Value   SBIRT (23 yo +)    In order to provide better care to our patients, we are screening all of our patients for alcohol and drug use  Would it be okay to ask you these screening questions?  No Filed at: 05/31/2022 0251                    MDM  Number of Diagnoses or Management Options      Disposition  Final diagnoses:   Anxiety     Time reflects when diagnosis was documented in both MDM as applicable and the Disposition within this note     Time User Action Codes Description Comment    5/31/2022  2:46 AM Celina Ansari Add [F41 9] Anxiety       ED Disposition     ED Disposition   Discharge    Condition   Stable    Date/Time   Tue May 31, 2022  2:46 AM    Comment   [facility] discharge to home/self care  Follow-up Information     Follow up With Specialties Details Why Contact Info Additional [address]   [address], [address] [facility] [address]  [address], [address], [address] [place], [place], [address]          Patient's Medications   Discharge Prescriptions    No medications on file       No discharge procedures on file      PDMP Review       Value Time User    PDMP Reviewed  Yes 10/5/2020  3:55 PM Stephen Sow PA-C          ED Provider  Electronically Signed by           Suzanna Edwards PA-C  05/31/22 5196

## 2022-10-29 ENCOUNTER — HOSPITAL ENCOUNTER (EMERGENCY)
Facility: HOSPITAL | Age: 32
Discharge: ANOTHER HOSPITAL | End: 2022-10-31
Attending: EMERGENCY MEDICINE

## 2022-10-29 DIAGNOSIS — R45.851 SUICIDAL IDEATIONS: Primary | ICD-10-CM

## 2022-10-29 DIAGNOSIS — R45.1 AGITATION: ICD-10-CM

## 2022-10-29 RX ORDER — LORAZEPAM 2 MG/ML
INJECTION INTRAMUSCULAR
Status: COMPLETED
Start: 2022-10-29 | End: 2022-10-29

## 2022-10-29 RX ORDER — BENZTROPINE MESYLATE 1 MG/ML
1 INJECTION INTRAMUSCULAR; INTRAVENOUS ONCE
Status: COMPLETED | OUTPATIENT
Start: 2022-10-29 | End: 2022-10-29

## 2022-10-29 RX ORDER — HALOPERIDOL 5 MG/ML
5 INJECTION INTRAMUSCULAR ONCE
Status: COMPLETED | OUTPATIENT
Start: 2022-10-29 | End: 2022-10-29

## 2022-10-29 RX ORDER — LORAZEPAM 2 MG/ML
2 INJECTION INTRAMUSCULAR ONCE
Status: COMPLETED | OUTPATIENT
Start: 2022-10-29 | End: 2022-10-29

## 2022-10-29 RX ORDER — ONDANSETRON 4 MG/1
4 TABLET, ORALLY DISINTEGRATING ORAL ONCE
Status: COMPLETED | OUTPATIENT
Start: 2022-10-29 | End: 2022-10-29

## 2022-10-29 RX ORDER — KETAMINE HYDROCHLORIDE 50 MG/ML
4 INJECTION, SOLUTION, CONCENTRATE INTRAMUSCULAR; INTRAVENOUS ONCE
Status: COMPLETED | OUTPATIENT
Start: 2022-10-29 | End: 2022-10-29

## 2022-10-29 RX ADMIN — LORAZEPAM 2 MG: 2 INJECTION INTRAMUSCULAR; INTRAVENOUS at 20:55

## 2022-10-29 RX ADMIN — KETAMINE HYDROCHLORIDE 480 MG: 50 INJECTION INTRAMUSCULAR; INTRAVENOUS at 20:54

## 2022-10-29 RX ADMIN — HALOPERIDOL LACTATE 5 MG: 5 INJECTION, SOLUTION INTRAMUSCULAR at 20:56

## 2022-10-29 RX ADMIN — BENZTROPINE MESYLATE 1 MG: 1 INJECTION INTRAMUSCULAR; INTRAVENOUS at 20:57

## 2022-10-29 RX ADMIN — LORAZEPAM 2 MG: 2 INJECTION INTRAMUSCULAR at 20:55

## 2022-10-29 RX ADMIN — ONDANSETRON 4 MG: 4 TABLET, ORALLY DISINTEGRATING ORAL at 23:12

## 2022-10-30 ENCOUNTER — APPOINTMENT (EMERGENCY)
Dept: RADIOLOGY | Facility: HOSPITAL | Age: 32
End: 2022-10-30

## 2022-10-30 LAB
AMPHETAMINES SERPL QL SCN: NEGATIVE
ANION GAP SERPL CALCULATED.3IONS-SCNC: 9 MMOL/L (ref 4–13)
BARBITURATES UR QL: NEGATIVE
BASOPHILS # BLD AUTO: 0.05 THOUSANDS/ÂΜL (ref 0–0.1)
BASOPHILS NFR BLD AUTO: 1 % (ref 0–1)
BENZODIAZ UR QL: NEGATIVE
BUN SERPL-MCNC: 14 MG/DL (ref 5–25)
CALCIUM SERPL-MCNC: 9.5 MG/DL (ref 8.3–10.1)
CHLORIDE SERPL-SCNC: 102 MMOL/L (ref 96–108)
CO2 SERPL-SCNC: 27 MMOL/L (ref 21–32)
COCAINE UR QL: NEGATIVE
CREAT SERPL-MCNC: 1.05 MG/DL (ref 0.6–1.3)
EOSINOPHIL # BLD AUTO: 0.03 THOUSAND/ÂΜL (ref 0–0.61)
EOSINOPHIL NFR BLD AUTO: 0 % (ref 0–6)
ERYTHROCYTE [DISTWIDTH] IN BLOOD BY AUTOMATED COUNT: 13.4 % (ref 11.6–15.1)
ETHANOL EXG-MCNC: 0 MG/DL
ETHANOL SERPL-MCNC: <3 MG/DL (ref 0–3)
FLUAV RNA RESP QL NAA+PROBE: NEGATIVE
FLUBV RNA RESP QL NAA+PROBE: NEGATIVE
GFR SERPL CREATININE-BSD FRML MDRD: 93 ML/MIN/1.73SQ M
GLUCOSE SERPL-MCNC: 158 MG/DL (ref 65–140)
HCT VFR BLD AUTO: 46.8 % (ref 36.5–49.3)
HGB BLD-MCNC: 15.6 G/DL (ref 12–17)
IMM GRANULOCYTES # BLD AUTO: 0.02 THOUSAND/UL (ref 0–0.2)
IMM GRANULOCYTES NFR BLD AUTO: 0 % (ref 0–2)
LYMPHOCYTES # BLD AUTO: 2.53 THOUSANDS/ÂΜL (ref 0.6–4.47)
LYMPHOCYTES NFR BLD AUTO: 25 % (ref 14–44)
MCH RBC QN AUTO: 29.8 PG (ref 26.8–34.3)
MCHC RBC AUTO-ENTMCNC: 33.3 G/DL (ref 31.4–37.4)
MCV RBC AUTO: 89 FL (ref 82–98)
METHADONE UR QL: NEGATIVE
MONOCYTES # BLD AUTO: 0.41 THOUSAND/ÂΜL (ref 0.17–1.22)
MONOCYTES NFR BLD AUTO: 4 % (ref 4–12)
NEUTROPHILS # BLD AUTO: 7.08 THOUSANDS/ÂΜL (ref 1.85–7.62)
NEUTS SEG NFR BLD AUTO: 70 % (ref 43–75)
NRBC BLD AUTO-RTO: 0 /100 WBCS
OPIATES UR QL SCN: NEGATIVE
OXYCODONE+OXYMORPHONE UR QL SCN: NEGATIVE
PCP UR QL: NEGATIVE
PLATELET # BLD AUTO: 279 THOUSANDS/UL (ref 149–390)
PMV BLD AUTO: 9.9 FL (ref 8.9–12.7)
POTASSIUM SERPL-SCNC: 3.8 MMOL/L (ref 3.5–5.3)
RBC # BLD AUTO: 5.24 MILLION/UL (ref 3.88–5.62)
RSV RNA RESP QL NAA+PROBE: NEGATIVE
SARS-COV-2 RNA RESP QL NAA+PROBE: NEGATIVE
SODIUM SERPL-SCNC: 138 MMOL/L (ref 135–147)
THC UR QL: NEGATIVE
WBC # BLD AUTO: 10.12 THOUSAND/UL (ref 4.31–10.16)

## 2022-10-30 RX ORDER — HYDROXYZINE HYDROCHLORIDE 25 MG/1
25 TABLET, FILM COATED ORAL ONCE
Status: COMPLETED | OUTPATIENT
Start: 2022-10-30 | End: 2022-10-30

## 2022-10-30 RX ORDER — ACETAMINOPHEN 325 MG/1
650 TABLET ORAL ONCE
Status: COMPLETED | OUTPATIENT
Start: 2022-10-30 | End: 2022-10-30

## 2022-10-30 RX ADMIN — ACETAMINOPHEN 650 MG: 325 TABLET ORAL at 16:36

## 2022-10-30 RX ADMIN — HYDROXYZINE HYDROCHLORIDE 25 MG: 25 TABLET, FILM COATED ORAL at 22:38

## 2022-10-30 NOTE — ED NOTES
Upon moving patient to room 13, patient forcefully threw self on the ground and began slamming head onto the floor and wall  Attempted to reorient patient multiple times by speaking calmly and offering comfort care items such as a blanket and water  Patient non responding  Patient finally moved to the bed and stated he was hearing voices  Patient stated these voices were telling him to hurt himself  Patient remained physically aggressive towards self  Provider arrived to bedside and patient stated he wanted to hurt self  Patient stated he was off his medications for unknown period of time  With provider at bedside, patient began to escalate  Upon attempting to administer medications to patient, patient became physically aggressive towards staff  Patient physically and medically restrained for patient and staff safety at this time  While attempting to restrain patient, patient yelling, "Just kill me  I hope you just kill me  Why do you think I am doing this?  I want you to kill me "      Karl Alex, VARINDER  10/29/22 2054

## 2022-10-30 NOTE — ED NOTES
Pt woke up and spitting up, feeling nauseous   Provider made aware     Latonya Joshua, VARINDER  10/29/22 1756

## 2022-10-30 NOTE — RESTRAINT FACE TO FACE
Restraint Face to Face   [facility] 28 y o  male MRN: [id number]  Unit/Bed#: ED 13 Encounter: [id number]      Physical Evaluation Patient lying on floor, banging head, scratching self, uncooperative  Purpose for Restraints/ Seclusion High risk for self harm, High risk for causing significant disruption of treatment environment , High risk for harm to others and high risk for flight  Patient's reaction to the intervention Angry  Patient's medical condition Stable  Patient's Behavioral condition Unstable  Restraints to be Continued

## 2022-10-30 NOTE — ED CARE HANDOFF
Emergency Department Sign Out Note        Sign out and transfer of care from [facility], CHRIS  See Separate Emergency Department note  The patient, Padmini Oliveros, was evaluated by the previous provider for SI/HI      Workup Completed:  Results for orders placed or performed during the hospital encounter of 10/29/22   FLU/RSV/COVID - if FLU/RSV clinically relevant    Specimen: Nose; Nares   Result Value Ref Range    SARS-CoV-2 Negative Negative    INFLUENZA A PCR Negative Negative    INFLUENZA B PCR Negative Negative    RSV PCR Negative Negative   Rapid drug screen, urine   Result Value Ref Range    Amph/Meth UR Negative Negative    Barbiturate Ur Negative Negative    Benzodiazepine Urine Negative Negative    Cocaine Urine Negative Negative    Methadone Urine Negative Negative    Opiate Urine Negative Negative    PCP Ur Negative Negative    THC Urine Negative Negative    Oxycodone Urine Negative Negative   Basic metabolic panel   Result Value Ref Range    Sodium 138 135 - 147 mmol/L    Potassium 3 8 3 5 - 5 3 mmol/L    Chloride 102 96 - 108 mmol/L    CO2 27 21 - 32 mmol/L    ANION GAP 9 4 - 13 mmol/L    BUN 14 5 - 25 mg/dL    Creatinine 1 05 0 60 - 1 30 mg/dL    Glucose 158 (H) 65 - 140 mg/dL    Calcium 9 5 8 3 - 10 1 mg/dL    eGFR 93 ml/min/1 73sq m   CBC and differential   Result Value Ref Range    WBC 10 12 4 31 - 10 16 Thousand/uL    RBC 5 24 3 88 - 5 62 Million/uL    Hemoglobin 15 6 12 0 - 17 0 g/dL    Hematocrit 46 8 36 5 - 49 3 %    MCV 89 82 - 98 fL    MCH 29 8 26 8 - 34 3 pg    MCHC 33 3 31 4 - 37 4 g/dL    RDW 13 4 11 6 - 15 1 %    MPV 9 9 8 9 - 12 7 fL    Platelets 524 054 - 080 Thousands/uL    nRBC 0 /100 WBCs    Neutrophils Relative 70 43 - 75 %    Immat GRANS % 0 0 - 2 %    Lymphocytes Relative 25 14 - 44 %    Monocytes Relative 4 4 - 12 %    Eosinophils Relative 0 0 - 6 %    Basophils Relative 1 0 - 1 %    Neutrophils Absolute 7 08 1 85 - 7 62 Thousands/µL    Immature Grans Absolute 0 02 0 00 - 0 20 Thousand/uL    Lymphocytes Absolute 2 53 0 60 - 4 47 Thousands/µL    Monocytes Absolute 0 41 0 17 - 1 22 Thousand/µL    Eosinophils Absolute 0 03 0 00 - 0 61 Thousand/µL    Basophils Absolute 0 05 0 00 - 0 10 Thousands/µL   Ethanol   Result Value Ref Range    Ethanol Lvl <3 0 - 3 mg/dL   POCT alcohol breath test   Result Value Ref Range    EXTBreath Alcohol 0 000          ED Course / Workup Pending (followup): Awaiting placement  201 signed  Cant leave  Patient complained of mild headache and back pain  Tylenol given  Patient sleeping comfortably on re-evaluation  ED Course as of 10/30/22 1942   Omaira Daly Oct 30, 2022   1549 Sign out from [facility]  Medically cleared  201 signed, cant leave  Psych consult placed  Awaiting placement   1636 Patient complaining of slight headache and back pain  Tylenol ordered     Procedures  MDM    Patient sleeping comfortably with no acute concerns under my care in ER  Disposition  Final diagnoses:   Suicidal ideations   Agitation     Time reflects when diagnosis was documented in both MDM as applicable and the Disposition within this note     Time User Action Codes Description Comment    10/30/2022  6:47 AM Rinda Barley Add [W81 104] Suicidal ideations     10/30/2022  6:47 AM Rinda Barley Add [R45 1] Agitation       ED Disposition     None      MD Documentation    Flowsheet Row Most Recent Value   Sending MD Sena      Follow-up Information    None       Patient's Medications   Discharge Prescriptions    No medications on file     No discharge procedures on file         ED Provider  Electronically Signed by     Yvonne Bautista PA-C  10/30/22 1956

## 2022-10-30 NOTE — ED NOTES
Unable to obtain BAT or UDS on patient  Will attempt to obtain when patient is awake  Provider notified        Fernando Wong RN  10/30/22 9951

## 2022-10-30 NOTE — ED NOTES
Upon moving patient to room 13, patient threw himself to the ground and began sobbing  Security at bedside  RN and security attempted to reorient patient at this time with multiple failed attempts        Karl Alex RN  10/29/22 2043

## 2022-10-30 NOTE — ED NOTES
RN took over care for patient at this time  Report received from [facility], [address] [facility]  Patient remains on 1:1 for safety precautions  1:1 at bedside  Patient awake and laying quietly in bed, cooperative at this time  Q1 hour continual observations continue at this time, see computer charting for continual observation charting  Will continue to monitor patient        Juju Hicks RN  10/30/22 0522

## 2022-10-30 NOTE — ED CARE HANDOFF
Emergency Department Sign Out Note        Sign out and transfer of care from [facility]  See Separate Emergency Department note  The patient, Leia Navarro, was evaluated by the previous provider for suicidal and homicidal ideations  Workup Completed:  Labs Reviewed   BASIC METABOLIC PANEL - Abnormal       Result Value Ref Range Status    Sodium 138  135 - 147 mmol/L Final    Potassium 3 8  3 5 - 5 3 mmol/L Final    Chloride 102  96 - 108 mmol/L Final    CO2 27  21 - 32 mmol/L Final    ANION GAP 9  4 - 13 mmol/L Final    BUN 14  5 - 25 mg/dL Final    Creatinine 1 05  0 60 - 1 30 mg/dL Final    Comment: Standardized to IDMS reference method    Glucose 158 (*) 65 - 140 mg/dL Final    Comment: If the patient is fasting, the ADA then defines impaired fasting glucose as > 100 mg/dL and diabetes as > or equal to 123 mg/dL  Specimen collection should occur prior to Sulfasalazine administration due to the potential for falsely depressed results  Specimen collection should occur prior to Sulfapyridine administration due to the potential for falsely elevated results      Calcium 9 5  8 3 - 10 1 mg/dL Final    eGFR 93  ml/min/1 73sq m Final    Narrative:     [facility] guidelines for Chronic Kidney Disease (CKD):   •  Stage 1 with normal or high GFR (GFR > 90 mL/min/1 73 square meters)  •  Stage 2 Mild CKD (GFR = 60-89 mL/min/1 73 square meters)  •  Stage 3A Moderate CKD (GFR = 45-59 mL/min/1 73 square meters)  •  Stage 3B Moderate CKD (GFR = 30-44 mL/min/1 73 square meters)  •  Stage 4 Severe CKD (GFR = 15-29 mL/min/1 73 square meters)  •  Stage 5 End Stage CKD (GFR <15 mL/min/1 73 square meters)  Note: GFR calculation is accurate only with a steady state creatinine   COVID19, INFLUENZA A/B, RSV PCR, [facility] - Normal    SARS-CoV-2 Negative  Negative Final    Comment:      INFLUENZA A PCR Negative  Negative Final    Comment:      INFLUENZA B PCR Negative  Negative Final    Comment: RSV PCR Negative  Negative Final    Comment:      Narrative:     FOR PEDIATRIC PATIENTS - copy/paste COVID Guidelines URL to browser: [email or web address]  [email or web address]    SARS-CoV-2 assay is a Nucleic Acid Amplification assay intended for the  qualitative detection of nucleic acid from SARS-CoV-2 in nasopharyngeal  swabs  Results are for the presumptive identification of SARS-CoV-2 RNA  Positive results are indicative of infection with SARS-CoV-2, the virus  causing COVID-19, but do not rule out bacterial infection or co-infection  with other viruses  Laboratories within the [place] and its  territories are required to report all positive results to the appropriate  public health authorities  Negative results do not preclude SARS-CoV-2  infection and should not be used as the sole basis for treatment or other  patient management decisions  Negative results must be combined with  clinical observations, patient history, and epidemiological information  This test has not been FDA cleared or approved  This test has been authorized by FDA under an Emergency Use Authorization  (EUA)  This test is only authorized for the duration of time the  declaration that circumstances exist justifying the authorization of the  emergency use of an in vitro diagnostic tests for detection of SARS-CoV-2  virus and/or diagnosis of COVID-19 infection under section 564(b)(1) of  the Act, 21 U  S C  498YJS-4(P)(2), unless the authorization is terminated  or revoked sooner  The test has been validated but independent review by FDA  and CLIA is pending  Test performed using [employer] GeneXpert: This RT-PCR assay targets N2,  a region unique to SARS-CoV-2  A conserved region in the E-gene was chosen  for pan-Sarbecovirus detection which includes SARS-CoV-2  According to CMS-2020-01-R, this platform meets the definition of high-throughput technology     RAPID DRUG SCREEN, URINE - Normal Amph/Meth UR Negative  Negative Final    Barbiturate Ur Negative  Negative Final    Benzodiazepine Urine Negative  Negative Final    Cocaine Urine Negative  Negative Final    Methadone Urine Negative  Negative Final    Opiate Urine Negative  Negative Final    PCP Ur Negative  Negative Final    THC Urine Negative  Negative Final    Oxycodone Urine Negative  Negative Final    Narrative:     FOR MEDICAL PURPOSES ONLY  IF CONFIRMATION NEEDED PLEASE CONTACT THE LAB WITHIN 5 DAYS      Drug Screen Cutoff Levels:  AMPHETAMINE/METHAMPHETAMINES  1000 ng/mL  BARBITURATES     200 ng/mL  BENZODIAZEPINES     200 ng/mL  COCAINE      300 ng/mL  METHADONE      300 ng/mL  OPIATES      300 ng/mL  PHENCYCLIDINE     25 ng/mL  THC       50 ng/mL  OXYCODONE      100 ng/mL   MEDICAL ALCOHOL - Normal    Ethanol Lvl <3  0 - 3 mg/dL Final   CBC AND DIFFERENTIAL    WBC 10 12  4 31 - 10 16 Thousand/uL Final    RBC 5 24  3 88 - 5 62 Million/uL Final    Hemoglobin 15 6  12 0 - 17 0 g/dL Final    Hematocrit 46 8  36 5 - 49 3 % Final    MCV 89  82 - 98 fL Final    MCH 29 8  26 8 - 34 3 pg Final    MCHC 33 3  31 4 - 37 4 g/dL Final    RDW 13 4  11 6 - 15 1 % Final    MPV 9 9  8 9 - 12 7 fL Final    Platelets 758  538 - 390 Thousands/uL Final    nRBC 0  /100 WBCs Final    Neutrophils Relative 70  43 - 75 % Final    Immat GRANS % 0  0 - 2 % Final    Lymphocytes Relative 25  14 - 44 % Final    Monocytes Relative 4  4 - 12 % Final    Eosinophils Relative 0  0 - 6 % Final    Basophils Relative 1  0 - 1 % Final    Neutrophils Absolute 7 08  1 85 - 7 62 Thousands/µL Final    Immature Grans Absolute 0 02  0 00 - 0 20 Thousand/uL Final    Lymphocytes Absolute 2 53  0 60 - 4 47 Thousands/µL Final    Monocytes Absolute 0 41  0 17 - 1 22 Thousand/µL Final    Eosinophils Absolute 0 03  0 00 - 0 61 Thousand/µL Final    Basophils Absolute 0 05  0 00 - 0 10 Thousands/µL Final   POCT ALCOHOL BREATH TEST         ED Course / Workup Pending (followup):  -Patient placed in restraints overnight by previous provider due to aggressive and self harming behavior                                  ED Course as of 10/30/22 1536   Sun Oct 30, 2022   1331 Pt has been increasingly compliant, no further violent outbursts or aggression over the past few hours  Will reduce to two point restraints, evaluate response and consider full removal pending    1352 As patient tolerating removal of restraints will obtain UDS, alcohol testing, crisis eval  Will place psych consult as timing approaches 24 hours since presentation  [id number] [facility] RN [facility] requiring several labs prior to authorizing/paying for patient's psychiatric admission  Requesting UDS, alcohol, CBC, Chem7, COVID19 testing, CXR, EKG  [phone] Pt signed out to [employer]     Procedures  MDM  Number of Diagnoses or Management Options  Agitation  Suicidal ideations  Diagnosis management comments: Stable throughout time of my care  Removed from restraints as outlined in ED course  Screening labs ordered per [facility] recommendations  Awaiting placement  Amount and/or Complexity of Data Reviewed  Clinical lab tests: reviewed    Patient Progress  Patient progress: stable          Disposition  Final diagnoses:   Suicidal ideations   Agitation     Time reflects when diagnosis was documented in both MDM as applicable and the Disposition within this note     Time User Action Codes Description Comment    10/30/2022  6:47 AM Ajith Gonzales Add [R45 851] Suicidal ideations     10/30/2022  6:47 AM Ajith Gonzales Add [R45 1] Agitation       ED Disposition     None      Follow-up Information    None       Patient's Medications   Discharge Prescriptions    No medications on file     No discharge procedures on file         ED Provider  Electronically Signed by     Bee Jaquez PA-C  10/30/22 1539

## 2022-10-30 NOTE — ED NOTES
RN assumed care of patient at this time  Patient observed sleeping in bed  Pt in no apparent distress        Arsalan Hernandez RN  10/30/22 9981
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
## 2022-10-30 NOTE — ED NOTES
Spoke with Mr Sam Soriano who shared that he has been off of his psychiatric medications for a couple of weeks, and is having suicidal thoughts  He reported that he has been hospitalized several times before, and as a , he goes to [facility]  He reports being in [place] 6 or so months ago  He states he feels he needs hospitalized again at this time to help him get stabilized again and keep him safe until he his SI stops  Called [place] and spoke with  who stated he is capitated for [employer] and cannot go to [place] unless [facility] is full and refers out to them  Called [facility] and spoke with Carmelita Porter at [phone]  Ext [phone]  He provided a list of labs they need completed and faxed to them along with the 2-1 and chart  He also stated that they do not have an available bed today, but they will review the referral for Mr Sam Soriano tomorrow  The labs needed are: Blood alcohol level, uds, cbc, chem7, covid, chest xray, ekg  Provided list of needed labs to VARINDER An who provided them to [address] [place] [address]
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
## 2022-10-30 NOTE — ED NOTES
Patient found laying on the stretcher, hitting his head off the wall repeatedly  Patient was asked to stop hitting his head and was moved away from the wall  Charge RN and provider made aware  Patient ambulatory with this RN into the bubble        Ascencion Amin RN  10/29/22 2123

## 2022-10-31 VITALS
DIASTOLIC BLOOD PRESSURE: 77 MMHG | TEMPERATURE: 98.4 F | RESPIRATION RATE: 16 BRPM | OXYGEN SATURATION: 99 % | BODY MASS INDEX: 38.02 KG/M2 | SYSTOLIC BLOOD PRESSURE: 130 MMHG | HEART RATE: 82 BPM | HEIGHT: 70 IN

## 2022-10-31 LAB
ATRIAL RATE: 67 BPM
ATRIAL RATE: 89 BPM
P AXIS: 33 DEGREES
P AXIS: 40 DEGREES
PR INTERVAL: 162 MS
PR INTERVAL: 164 MS
QRS AXIS: 0 DEGREES
QRS AXIS: 58 DEGREES
QRSD INTERVAL: 70 MS
QRSD INTERVAL: 72 MS
QT INTERVAL: 364 MS
QT INTERVAL: 396 MS
QTC INTERVAL: 418 MS
QTC INTERVAL: 442 MS
T WAVE AXIS: -2 DEGREES
T WAVE AXIS: 3 DEGREES
VENTRICULAR RATE: 67 BPM
VENTRICULAR RATE: 89 BPM

## 2022-10-31 RX ORDER — IBUPROFEN 600 MG/1
600 TABLET ORAL ONCE
Status: COMPLETED | OUTPATIENT
Start: 2022-10-31 | End: 2022-10-31

## 2022-10-31 RX ORDER — OLANZAPINE 5 MG/1
5 TABLET ORAL DAILY
Status: DISCONTINUED | OUTPATIENT
Start: 2022-10-31 | End: 2022-10-31 | Stop reason: HOSPADM

## 2022-10-31 RX ADMIN — IBUPROFEN 600 MG: 600 TABLET, FILM COATED ORAL at 17:55

## 2022-10-31 RX ADMIN — OLANZAPINE 5 MG: 5 TABLET, FILM COATED ORAL at 15:02

## 2022-10-31 NOTE — ED NOTES
Informed patient of acceptance at the [facility]- Will inform of transport time as it is made available- Patient offers no further questions at this time      Mohsen Atkinson  Crisis Intervention Specialist II  10/31/22

## 2022-10-31 NOTE — ED NOTES
RN took over care for patient at this time  Patient remains on 1:1 at bedside  Patient awake and laying quietly in bed, cooperative at this time        Kat Zimmerman RN  10/31/22 6528

## 2022-10-31 NOTE — ED NOTES
Andriy Fisher at [employer] will be around [phone] depending on traffic coming from [employer]      Erik Hill  Crisis Intervention Specialist II  10/31/22

## 2022-10-31 NOTE — ED NOTES
Per Sarah Fontenot at [facility]- Needed two things filled out on 201- Still waiting transport time- Updated 201 faxed      Mohsen Atkinson  Crisis Intervention Specialist II  10/31/22

## 2022-10-31 NOTE — ED NOTES
Received phone call from Dr Temo Jose at [facility] on whether the patient was in good enough health to be transported and if he had a home address for discharge- Dr Temo Jose reported that we would hear back from [place] robbin Zimmerman  Crisis Intervention Specialist II  10/31/22

## 2022-10-31 NOTE — ED NOTES
Pt sleeping at this time  No apparent distress present  Respirations are equal and non-labored  VS and BH assessments deferred at this time  1:1 continued        Lyna Sandifer, RN  10/31/22 8082
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
## 2022-10-31 NOTE — ED NOTES
Spoke to [address]  is requesting all notes and vitals on patient  Clinicals sent for review

## 2022-10-31 NOTE — ED NOTES
Patient is accepted at [facility]  Patient is accepted by Dr Bianca Vargas per Remedios Lema      Transportation is arranged by the [facility] is scheduled for TBD      TYRA Mckeon  II  10/31/22

## 2022-10-31 NOTE — ED NOTES
CTS given [place] folder with the patient's paperwork and all of the patient's belongings        Lisa Byers RN  10/31/22 6852

## 2022-10-31 NOTE — ED NOTES
Spoke with Edmundo at [facility]  Patient needs an EKG and Chest Xray before he can be considered    Both faxed to [facility] for review

## 2022-10-31 NOTE — ED CARE HANDOFF
Emergency Department Sign Out Note        Sign out and transfer of care from [facility]  See Separate Emergency Department note  The patient, Fannie Scott, was evaluated by the previous provider for suicidal and homicidal ideations  Workup Completed:  Labs Reviewed   BASIC METABOLIC PANEL - Abnormal       Result Value Ref Range Status    Sodium 138  135 - 147 mmol/L Final    Potassium 3 8  3 5 - 5 3 mmol/L Final    Chloride 102  96 - 108 mmol/L Final    CO2 27  21 - 32 mmol/L Final    ANION GAP 9  4 - 13 mmol/L Final    BUN 14  5 - 25 mg/dL Final    Creatinine 1 05  0 60 - 1 30 mg/dL Final    Comment: Standardized to IDMS reference method    Glucose 158 (*) 65 - 140 mg/dL Final    Comment: If the patient is fasting, the ADA then defines impaired fasting glucose as > 100 mg/dL and diabetes as > or equal to 123 mg/dL  Specimen collection should occur prior to Sulfasalazine administration due to the potential for falsely depressed results  Specimen collection should occur prior to Sulfapyridine administration due to the potential for falsely elevated results      Calcium 9 5  8 3 - 10 1 mg/dL Final    eGFR 93  ml/min/1 73sq m Final    Narrative:     [facility] guidelines for Chronic Kid[facility] Disease (CKD):   •  Stage 1 with normal or high GFR (GFR > 90 mL/min/1 73 square meters)  •  Stage 2 Mild CKD (GFR = 60-89 mL/min/1 73 square meters)  •  Stage 3A Moderate CKD (GFR = 45-59 mL/min/1 73 square meters)  •  Stage 3B Moderate CKD (GFR = 30-44 mL/min/1 73 square meters)  •  Stage 4 Severe CKD (GFR = 15-29 mL/min/1 73 square meters)  •  Stage 5 End Stage CKD (GFR <15 mL/min/1 73 square meters)  Note: GFR calculation is accurate only with a steady state creatinine   COVID19, INFLUENZA A/B, RSV PCR, [facility] - Normal    SARS-CoV-2 Negative  Negative Final    Comment:      INFLUENZA A PCR Negative  Negative Final    Comment:      INFLUENZA B PCR Negative  Negative Final    Comment:      RSV PCR Negative  Negative Final    Comment:      Narrative:     FOR PEDIATRIC PATIENTS - copy/paste COVID Guidelines URL to browser: [email or web address]    SARS-CoV-2 assay is a Nucleic Acid Amplification assay intended for the  qualitative detection of nucleic acid from SARS-CoV-2 in nasopharyngeal  swabs  Results are for the presumptive identification of SARS-CoV-2 RNA  Positive results are indicative of infection with SARS-CoV-2, the virus  causing COVID-19, but do not rule out bacterial infection or co-infection  with other viruses  Laboratories within the [place] and its  territories are required to report all positive results to the appropriate  public health authorities  Negative results do not preclude SARS-CoV-2  infection and should not be used as the sole basis for treatment or other  patient management decisions  Negative results must be combined with  clinical observations, patient history, and epidemiological information  This test has not been FDA cleared or approved  This test has been authorized by FDA under an Emergency Use Authorization  (EUA)  This test is only authorized for the duration of time the  declaration that circumstances exist justifying the authorization of the  emergency use of an in vitro diagnostic tests for detection of SARS-CoV-2  virus and/or diagnosis of COVID-19 infection under section 564(b)(1) of  the Act, 21 U  S C  701LCM-1(K)(1), unless the authorization is terminated  or revoked sooner  The test has been validated but independent review by FDA  and CLIA is pending  Test performed using [employer] GeneXpert: This RT-PCR assay targets N2,  a region unique to SARS-CoV-2  A conserved region in the E-gene was chosen  for pan-Sarbecovirus detection which includes SARS-CoV-2  According to CMS-2020-01-R, this platform meets the definition of high-throughput technology     RAPID DRUG SCREEN, URINE - Normal Amph/Meth UR Negative  Negative Final    Barbiturate Ur Negative  Negative Final    Benzodiazepine Urine Negative  Negative Final    Cocaine Urine Negative  Negative Final    Methadone Urine Negative  Negative Final    Opiate Urine Negative  Negative Final    PCP Ur Negative  Negative Final    THC Urine Negative  Negative Final    Oxycodone Urine Negative  Negative Final    Narrative:     FOR MEDICAL PURPOSES ONLY  IF CONFIRMATION NEEDED PLEASE CONTACT THE LAB WITHIN 5 DAYS      Drug Screen Cutoff Levels:  AMPHETAMINE/METHAMPHETAMINES  1000 ng/mL  BARBITURATES     200 ng/mL  BENZODIAZEPINES     200 ng/mL  COCAINE      300 ng/mL  METHADONE      300 ng/mL  OPIATES      300 ng/mL  PHENCYCLIDINE     25 ng/mL  THC       50 ng/mL  OXYCODONE      100 ng/mL   MEDICAL ALCOHOL - Normal    Ethanol Lvl <3  0 - 3 mg/dL Final   POCT ALCOHOL BREATH TEST - Normal    EXTBreath Alcohol 0 000   Final   CBC AND DIFFERENTIAL    WBC 10 12  4 31 - 10 16 Thousand/uL Final    RBC 5 24  3 88 - 5 62 Million/uL Final    Hemoglobin 15 6  12 0 - 17 0 g/dL Final    Hematocrit 46 8  36 5 - 49 3 % Final    MCV 89  82 - 98 fL Final    MCH 29 8  26 8 - 34 3 pg Final    MCHC 33 3  31 4 - 37 4 g/dL Final    RDW 13 4  11 6 - 15 1 % Final    MPV 9 9  8 9 - 12 7 fL Final    Platelets 351  654 - 390 Thousands/uL Final    nRBC 0  /100 WBCs Final    Neutrophils Relative 70  43 - 75 % Final    Immat GRANS % 0  0 - 2 % Final    Lymphocytes Relative 25  14 - 44 % Final    Monocytes Relative 4  4 - 12 % Final    Eosinophils Relative 0  0 - 6 % Final    Basophils Relative 1  0 - 1 % Final    Neutrophils Absolute 7 08  1 85 - 7 62 Thousands/µL Final    Immature Grans Absolute 0 02  0 00 - 0 20 Thousand/uL Final    Lymphocytes Absolute 2 53  0 60 - 4 47 Thousands/µL Final    Monocytes Absolute 0 41  0 17 - 1 22 Thousand/µL Final    Eosinophils Absolute 0 03  0 00 - 0 61 Thousand/µL Final    Basophils Absolute 0 05  0 00 - 0 10 Thousands/µL Final         ED Course / Workup Pending (followup):  -Accepted to [address] prior to time of my sign on  -Awaiting transportation, scheduled for 6:30 PM                                    Procedures  MDM  Number of Diagnoses or Management Options  Agitation  Suicidal ideations  Diagnosis management comments: Stable throughout time of my care today through time of transfer  Amount and/or Complexity of Data Reviewed  Clinical lab tests: reviewed    Patient Progress  Patient progress: stable          Disposition  Final diagnoses:   Suicidal ideations   Agitation     Time reflects when diagnosis was documented in both MDM as applicable and the Disposition within this note     Time User Action Codes Description Comment    10/30/2022  6:47 AM Patricia Yeager Add [R45 851] Suicidal ideations     10/30/2022  6:47 AM Patricia Yeager Add [R45 1] Agitation       ED Disposition     ED Disposition   Transfer to [address] [place] [address]   --    Date/Time   Mon Oct 31, 2022  4:00 PM    Comment   Tommie Dickson should be transferred out to behavioral health and has been medically cleared             MD Documentation    [address] Most Recent Value   Patient Condition The patient has been stabilized such that within reasonable medical probability, no material deterioration of the patient condition or the condition of the unborn child(carter) is likely to result from the transfer   Reason for Transfer Level of Care needed not available at this facility   Benefits of Transfer Specialized equipment and/or services available at the receiving facility (Include comment)________________________  [Psychiatry]   Risks of Transfer Potential for delay in receiving treatment, Potential deterioration of medical condition, Increased discomfort during transfer, Possible worsening of condition or death during transfer   Accepting Physician Dr Marino Mays Name, [address], Brynn Angelucci PA    (Name & Tel number) Homero Mojica [phone]   Transported by (Company and Unit #) [facility] setting up   Sending MD Dr Magdiel Landrum   Provider Certification General risk, such as traffic hazards, adverse weather conditions, rough terrain or turbulence, possible failure of equipment (including vehicle or aircraft), or consequences of actions of persons outside the control of the transport personnel, Unanticipated needs of medical equipment and personnel during transport, Risk of worsening condition, The possibility of a transport vehicle being unavailable, The patient is stable for psychiatric transfer because they are medically stable, and is protected from harming him/herself or others during transport      RN Documentation    Flowsheet Row Most [facility] Name, [address], Nelda PERKINS    (Name & Tel number) Homero Mojica [phone]   Transported by [employer] and Unit #) [facility] setting up      Follow-up Information    None       Discharge Medication List as of 10/31/2022  6:46 PM      CONTINUE these medications which have NOT CHANGED    Details   acetaminophen (TYLENOL) 500 mg tablet Take 1 tablet (500 mg total) by mouth every 6 (six) hours as needed for mild pain, moderate pain or headaches, Starting Mon 4/5/2021, Normal      cyclobenzaprine (FLEXERIL) 10 mg tablet Take 1 tablet (10 mg total) by mouth 3 (three) times a day as needed for muscle spasms for up to 3 days, Starting Wed 2/2/2022, Until Sat 2/5/2022 at 2359, Normal      hydrOXYzine HCL (ATARAX) 25 mg tablet Take 1 tablet (25 mg total) by mouth every 6 (six) hours, Starting Thu 4/15/2021, Print      omeprazole (PriLOSEC) 20 mg delayed release capsule TAKE 1 CAPSULE BY MOUTH EVERY DAY FOR STOMACH OR REFLUX, Historical Med           No discharge procedures on file         ED Provider  Electronically Signed by     Flor Cortez PA-C  10/31/22 9959

## 2022-10-31 NOTE — ED NOTES
Assumed care of pt at this time  Pt sleeping, respirations equal and non-labored  No apparent distress present  1:1 continued       Barb Peña, VARINDER  10/30/22 2731

## 2022-10-31 NOTE — ED NOTES
Assumed care of patient at this time  Pt is cooperative   Pt showered at this time and changed scrubs at this time      Devon Segal RN  10/31/22 4663

## 2022-10-31 NOTE — ED NOTES
Patient ate breakfast and then returned to resting in bed  Remains cooperative at this time       Osvaldo Pastor RN  10/31/22 6311

## 2022-10-31 NOTE — ED NOTES
[address] [facility] [address] fax number (to fax chart, [phone] and requested lab results Monday monring) is [phone]  Please call either right before or right after fax   Use phone number [phone] ext [phone]

## 2022-10-31 NOTE — ED NOTES
Pt sleeping at this time  No apparent distress present  Respirations are equal and non-labored  VS and BH assessments deferred at this time  1:1 silvana Garnett RN  10/31/22 0010

## 2022-10-31 NOTE — ED CARE HANDOFF
Emergency Department Sign Out Note        Sign out and transfer of care from [facility]  See Separate Emergency Department note  The patient, Avinash Hopkins, was evaluated by the previous provider for suicidal and homicidal ideations  Workup Completed:  POCT alcohol breath test  Ethanol  BMP  CBC  UDS  Flu/covid/rsv  CXR  Given Haldol, Cogentin, Ativan and Ketamine for agitation  Given Zofran, Tylenol and Atarax for other complaints  Seen by Crisis  201 signed  ED Course / Workup Pending (followup):  0600 - Per sign out, waiting for psychiatry consult and placement  No events overnight  [address] recommending another EKG  EKG obtained by nursing staff  80 - Psychiatry consult completed by Dr Bing Arce  Per Dr Bing Quinn I have completed the psychiatry consult on Avinash Hopkins  Inpatient psychiatric treatment is indicated for provision of precautions, further diagnostic evaluation and treatment stabilization  The patient is in agreement with this plan and is appropriate to sign 201 voluntary paperwork  Otherwise 302 would be indicated  Continual observation suicide precautions are indicated  In the meantime recommend starting Zyprexa initially at 5 mg p o  daily for psychosis and as mood stabilizer  This can be further titrated to effect if indicated as tolerated  The patient is in agreement with this plan  May also consider additional Zyprexa 5-10 mg p o  or IM q 6 hours p r n  agitation/psychosis  Total Zyprexa should not exceed 30 mg per 24 hours  Re-consult Psychiatry as needed  1520 - Patient signed out to Jono Rose PA-C awaiting placement  Patient stable  ED Course as of 10/31/22 1524   Mon Oct 31, 2022   1401 Psychiatry consult completed by Dr Bing Arce  Per Dr Bing Quinn I have completed the psychiatry consult on Avinash Hopkins   Inpatient psychiatric treatment is indicated for provision of precautions, further diagnostic evaluation and treatment stabilization  The patient is in agreement with this plan and is appropriate to sign 201 voluntary paperwork  Otherwise 302 would be indicated  Continual observation suicide precautions are indicated  In the meantime recommend starting Zyprexa initially at 5 mg p o  daily for psychosis and as mood stabilizer  This can be further titrated to effect if indicated as tolerated  The patient is in agreement with this plan  May also consider additional Zyprexa 5-10 mg p o  or IM q 6 hours p r n  agitation/psychosis  Total Zyprexa should not exceed 30 mg per 24 hours  Re-consult Psychiatry as needed       ECG 12 Lead Documentation Only    Date/Time: 10/31/2022 10:43 AM  Performed by: Daniel Hyman PA-C  Authorized by: Daniel Hyman PA-C     Indications / Diagnosis:  Requested by facility  ECG reviewed by me, the ED Provider: yes (Also reviewed by Dr Haydee Infante)    Patient location:  ED  Previous ECG:     Previous ECG:  Compared to current    Similarity:  No change  Interpretation:     Interpretation: abnormal    Rate:     ECG rate:  67    ECG rate assessment: normal    Rhythm:     Rhythm: sinus rhythm    Ectopy:     Ectopy: none    QRS:     QRS intervals:  Normal  ST segments:     ST segments:  Normal  T waves:     T waves: inverted      Inverted:  III, aVR and V1      MDM  Number of Diagnoses or Management Options  Agitation: new and requires workup  Suicidal ideations: new and requires workup  Diagnosis management comments:          Amount and/or Complexity of Data Reviewed  Clinical lab tests: reviewed  Review and summarize past medical records: yes  Discuss the patient with other providers: yes  Independent visualization of images, tracings, or specimens: yes    Patient Progress  Patient progress: stable          Disposition  Final diagnoses:   Suicidal ideations   Agitation     Time reflects when diagnosis was documented in both MDM as applicable and the Disposition within this note     Time User Action Codes Description Comment    10/30/2022  6:47 AM Tyson Osorio [G53 923] Suicidal ideations     10/30/2022  6:47 AM Tyson Osorio [R45 1] Agitation       ED Disposition     None      MD Documentation    Flowsheet Row Most Recent Value   Sending MD Hay      Follow-up Information    None       Patient's Medications   Discharge Prescriptions    No medications on file     No discharge procedures on file         ED Provider  Electronically Signed by     Yonatan Dai PA-C  10/31/22 5420

## 2022-10-31 NOTE — EMTALA/ACUTE CARE TRANSFER
[facility]  [address] [facility] [address]  Dept: [phone]      EMTALA TRANSFER CONSENT    NAME Alejandra Bright                                         1990                              MRN [id number]    I have been informed of my rights regarding examination, treatment, and transfer   by Dr Miranda att  providers found    Benefits: Specialized equipment and/or services available at the receiving facility (Include comment)________________________ (Psychiatry)    Risks: Potential for delay in receiving treatment, Potential deterioration of medical condition, Increased discomfort during transfer, Possible worsening of condition or death during transfer      Consent for Transfer:  I acknowledge that my medical condition has been evaluated and explained to me by the emergency department physician or other qualified medical person and/or my attending physician, who has recommended that I be transferred to the service of  Accepting Physician: Dr Anselmo Kurtz at [address] [facility] Name, [address] : [facility]  The above potential benefits of such transfer, the potential risks associated with such transfer, and the probable risks of not being transferred have been explained to me, and I fully understand them  The doctor has explained that, in my case, the benefits of transfer outweigh the risks  I agree to be transferred  I authorize the performance of emergency medical procedures and treatments upon me in both transit and upon arrival at the receiving facility  Additionally, I authorize the release of any and all medical records to the receiving facility and request they be transported with me, if possible  I understand that the safest mode of transportation during a medical emergency is an ambulance and that the Hospital advocates the use of this mode of transport   Risks of traveling to the receiving facility by car, including absence of medical control, life sustaining equipment, such as oxygen, and medical personnel has been explained to me and I fully understand them  (ZHANNA CORRECT BOX BELOW)  [  ]  I consent to the stated transfer and to be transported by ambulance/helicopter  [  ]  I consent to the stated transfer, but refuse transportation by ambulance and accept full responsibility for my transportation by car  I understand the risks of non-ambulance transfers and I exonerate the Hospital and its staff from any deterioration in my condition that results from this refusal     X___________________________________________    DATE  10/31/22  TIME________  Signature of patient or legally responsible individual signing on patient behalf           RELATIONSHIP TO PATIENT_________________________          Provider Certification    NAME Jacinto Ricketts                                        [facility] 1990                              MRN [id number]    A medical screening exam was performed on the above named patient  Based on the examination:    Condition Necessitating Transfer The primary encounter diagnosis was Suicidal ideations  A diagnosis of Agitation was also pertinent to this visit      Patient Condition: The patient has been stabilized such that within reasonable medical probability, no material deterioration of the patient condition or the condition of the unborn child(carter) is likely to result from the transfer    Reason for Transfer: Level of Care needed not available at this facility    Transfer Requirements: Heriberto PERKINS   · Space available and qualified personnel available for treatment as acknowledged by Eliezer Martines [phone]  · Agreed to accept transfer and to provide appropriate medical treatment as acknowledged by       Dr Serena Pereira  · Appropriate medical records of the examination and treatment of the patient are provided at the time of transfer   [address] _______  · Transfer will be performed by qualified personnel from [facility] setting up  and appropriate transfer equipment as required, including the use of necessary and appropriate life support measures  Provider Certification: I have examined the patient and explained the following risks and benefits of being transferred/refusing transfer to the patient/family:  General risk, such as traffic hazards, adverse weather conditions, rough terrain or turbulence, possible failure of equipment (including vehicle or aircraft), or consequences of actions of persons outside the control of the transport personnel, Unanticipated needs of medical equipment and personnel during transport, Risk of worsening condition, The possibility of a transport vehicle being unavailable, The patient is stable for psychiatric transfer because they are medically stable, and is protected from harming him/herself or others during transport      Based on these reasonable risks and benefits to the patient and/or the unborn child(carter), and based upon the information available at the time of the patient’s examination, I certify that the medical benefits reasonably to be expected from the provision of appropriate medical treatments at another medical facility outweigh the increasing risks, if any, to the individual’s medical condition, and in the case of labor to the unborn child, from effecting the transfer      X____________________________________________ DATE 10/31/22        TIME_______      ORIGINAL - SEND TO MEDICAL RECORDS   COPY - SEND WITH PATIENT DURING TRANSFER

## 2023-08-17 ENCOUNTER — HOSPITAL ENCOUNTER (EMERGENCY)
Facility: HOSPITAL | Age: 33
Discharge: HOME | End: 2023-08-17
Attending: EMERGENCY MEDICINE
Payer: COMMERCIAL

## 2023-08-17 VITALS
DIASTOLIC BLOOD PRESSURE: 58 MMHG | OXYGEN SATURATION: 99 % | WEIGHT: 245.15 LBS | BODY MASS INDEX: 35.1 KG/M2 | HEIGHT: 70 IN | SYSTOLIC BLOOD PRESSURE: 118 MMHG | RESPIRATION RATE: 18 BRPM | TEMPERATURE: 98.3 F | HEART RATE: 72 BPM

## 2023-08-17 DIAGNOSIS — L03.011 PARONYCHIA OF FINGER OF RIGHT HAND: Primary | ICD-10-CM

## 2023-08-17 PROCEDURE — 26010 DRAINAGE OF FINGER ABSCESS: CPT | Performed by: EMERGENCY MEDICINE

## 2023-08-17 PROCEDURE — 99282 EMERGENCY DEPT VISIT SF MDM: CPT

## 2023-08-17 PROCEDURE — 99284 EMERGENCY DEPT VISIT MOD MDM: CPT | Performed by: EMERGENCY MEDICINE

## 2023-08-17 RX ORDER — CEPHALEXIN 500 MG/1
500 CAPSULE ORAL EVERY 6 HOURS SCHEDULED
Qty: 20 CAPSULE | Refills: 0 | Status: SHIPPED | OUTPATIENT
Start: 2023-08-17 | End: 2023-08-22

## 2023-08-17 RX ORDER — LIDOCAINE HYDROCHLORIDE 10 MG/ML
5 INJECTION, SOLUTION EPIDURAL; INFILTRATION; INTRACAUDAL; PERINEURAL ONCE
Status: COMPLETED | OUTPATIENT
Start: 2023-08-17 | End: 2023-08-17

## 2023-08-17 RX ORDER — CEPHALEXIN 250 MG/1
500 CAPSULE ORAL ONCE
Status: COMPLETED | OUTPATIENT
Start: 2023-08-17 | End: 2023-08-17

## 2023-08-17 RX ADMIN — LIDOCAINE HYDROCHLORIDE 5 ML: 10 INJECTION, SOLUTION EPIDURAL; INFILTRATION; INTRACAUDAL at 22:28

## 2023-08-17 RX ADMIN — CEPHALEXIN 500 MG: 250 CAPSULE ORAL at 23:07

## 2023-08-18 ENCOUNTER — APPOINTMENT (EMERGENCY)
Dept: RADIOLOGY | Facility: HOSPITAL | Age: 33
End: 2023-08-18
Payer: COMMERCIAL

## 2023-08-18 ENCOUNTER — HOSPITAL ENCOUNTER (EMERGENCY)
Facility: HOSPITAL | Age: 33
Discharge: HOME | End: 2023-08-18
Attending: EMERGENCY MEDICINE
Payer: COMMERCIAL

## 2023-08-18 VITALS
TEMPERATURE: 98.5 F | RESPIRATION RATE: 18 BRPM | BODY MASS INDEX: 35.15 KG/M2 | HEART RATE: 72 BPM | SYSTOLIC BLOOD PRESSURE: 152 MMHG | DIASTOLIC BLOOD PRESSURE: 91 MMHG | OXYGEN SATURATION: 98 % | WEIGHT: 245 LBS

## 2023-08-18 DIAGNOSIS — M25.561 RIGHT KNEE PAIN: Primary | ICD-10-CM

## 2023-08-18 PROCEDURE — 99284 EMERGENCY DEPT VISIT MOD MDM: CPT | Performed by: EMERGENCY MEDICINE

## 2023-08-18 PROCEDURE — 99283 EMERGENCY DEPT VISIT LOW MDM: CPT

## 2023-08-18 PROCEDURE — 73564 X-RAY EXAM KNEE 4 OR MORE: CPT

## 2023-08-18 PROCEDURE — 96372 THER/PROPH/DIAG INJ SC/IM: CPT

## 2023-08-18 RX ORDER — KETOROLAC TROMETHAMINE 30 MG/ML
15 INJECTION, SOLUTION INTRAMUSCULAR; INTRAVENOUS ONCE
Status: COMPLETED | OUTPATIENT
Start: 2023-08-18 | End: 2023-08-18

## 2023-08-18 RX ADMIN — KETOROLAC TROMETHAMINE 15 MG: 30 INJECTION, SOLUTION INTRAMUSCULAR; INTRAVENOUS at 20:32

## 2023-08-18 NOTE — DISCHARGE INSTRUCTIONS
Take the keflex as directed to help resolve finger infection. Follow up with primary care provider for further management. Return to ED for new or worsening symptoms.

## 2023-08-19 NOTE — DISCHARGE INSTRUCTIONS
You were seen in the ED for right knee pain. Return to the ED for any worsening symptoms or new symptoms. Follow up with your primary care doctor as soon as possible. Take ibuprofen and tylenol for your pain.

## 2023-08-19 NOTE — ED ATTENDING ATTESTATION
8/18/2023  IGarrick MD, saw and evaluated the patient. I have discussed the patient with the resident/non-physician practitioner and agree with the resident's/non-physician practitioner's findings, Plan of Care, and MDM as documented in the resident's/non-physician practitioner's note, except where noted. All available labs and Radiology studies were reviewed. I was present for key portions of any procedure(s) performed by the resident/non-physician practitioner and I was immediately available to provide assistance. At this point I agree with the current assessment done in the Emergency Department. I have conducted an independent evaluation of this patient a history and physical is as follows:  Patient is 27 yo male, chronic back and knee pain, comes with recurrent right knee pain, pain is worse with walking and moving the knee, non-radiating, patient also reports numbness on the right foot/ankle area which he also has had in the past, did have recent low back injection for his chronic pain, no fever, chills, no bowel or bladder incontinence. On exam, patient is conscious, alert, vital signs are stable, afebrile, no acute distress, tenderness right medial knee, no warmth, no swelling, decreased active ROM due to pain, intact passive range of movement, patient able to lift the leg, distal/sensory exam intact. X-ray right knee was done that was independently reviewed, no significant acute abnormality was noted.  Impression: Nonspecific right knee pain, no injuries, possible radicular pain, knee sprain, no acute limb weakness suspected, no red flags for back pain, pain is mostly localized to the right medial knee, we will give knee immobilizer, chills, advised follow-up with his specialist at [place],    ED Course         Critical Care Time  Procedures

## 2023-08-19 NOTE — ED PROVIDER NOTES
History  Chief Complaint   Patient presents with   • Finger Pain     Pt reporting right ring finger pain for a couple days, unknown injury, denies biting nails, states he hit it today and it has been draining since. 55-year-old male presents ED for evaluation of right fourth digit pain. Patient states that for the past several days he has had pain and swelling in fingernail region. Today he was walking his dog and his dog pulled him in a different direction and then patient and caused pressure on his fourth digit fingernail. After this, the patient noticed pus coming from side of fingernail where previously painful and swollen. Patient concerned of infected fingernail. Denies fever, chills, nausea, vomiting, abdominal pain, pain with urination, blood in urine, change in urine appearance, chest pain, shortness of breath. Prior to Admission Medications   Prescriptions Last Dose Informant Patient Reported?  Taking?   acetaminophen (TYLENOL) 500 mg tablet   No No   Sig: Take 1 tablet (500 mg total) by mouth every 6 (six) hours as needed for mild pain, moderate pain or headaches   cyclobenzaprine (FLEXERIL) 10 mg tablet   No No   Sig: Take 1 tablet (10 mg total) by mouth 3 (three) times a day as needed for muscle spasms for up to 3 days   hydrOXYzine HCL (ATARAX) 25 mg tablet   No No   Sig: Take 1 tablet (25 mg total) by mouth every 6 (six) hours   Patient not taking: Reported on 8/18/2023   omeprazole (PriLOSEC) 20 mg delayed release capsule   Yes No   Sig: TAKE 1 CAPSULE BY MOUTH EVERY DAY FOR STOMACH OR REFLUX      Facility-Administered Medications: None       Past Medical History:   Diagnosis Date   • Chronic pain    • GERD (gastroesophageal reflux disease)    • Hypertension    • Manic depressive disorder [facility])        Past Surgical History:   Procedure Laterality Date   • APPENDECTOMY LAPAROSCOPIC N/A 10/29/2021    Procedure: APPENDECTOMY LAPAROSCOPIC;  Surgeon: Arlin Montelongo MD;  Location: [facility];  Service: General   • NO PAST SURGERIES         Family History   Problem Relation Age of Onset   • Hypertension Mother    • Diabetes Father      I have reviewed and agree with the history as documented. E-Cigarette/Vaping     E-Cigarette/Vaping Substances     Social History     Tobacco Use   • Smoking status: Never   • Smokeless tobacco: Never   Substance Use Topics   • Alcohol use: No   • Drug use: No       Review of Systems    Physical Exam  Physical Exam  Vitals and nursing note reviewed. Constitutional:       General: He is not in acute distress. Appearance: He is well-developed. HENT:      Head: Normocephalic and atraumatic. Eyes:      Conjunctiva/sclera: Conjunctivae normal.   Cardiovascular:      Rate and Rhythm: Normal rate and regular rhythm. Heart sounds: No murmur heard. Pulmonary:      Effort: Pulmonary effort is normal. No respiratory distress. Breath sounds: Normal breath sounds. Abdominal:      Palpations: Abdomen is soft. Tenderness: There is no abdominal tenderness. Musculoskeletal:         General: No swelling. Cervical back: Neck supple. Comments: Visual inspection of patient's right hand demonstrates abnormal appearance of fourth digit fingernail. There is drainage from side of nail. No subungual hematoma. No sign of fracture, dislocation of metacarpal bones. No bleeding. Tender to palpation of fingernail. See attached image. Skin:     General: Skin is warm and dry. Capillary Refill: Capillary refill takes less than 2 seconds. Neurological:      Mental Status: He is alert.    Psychiatric:         Mood and Affect: Mood normal.               Vital Signs  ED Triage Vitals [08/17/23 2100]   Temperature Pulse Respirations Blood Pressure SpO2   98.3 °F (36.8 °C) 72 18 118/58 99 %      Temp Source Heart Rate Source Patient Position - Orthostatic VS BP Location FiO2 (%)   Oral Monitor Sitting Right arm --      Pain Score       3 Vitals:    08/17/23 2100   BP: 118/58   Pulse: 72   Patient Position - Orthostatic VS: Sitting            Visual Acuity      ED Medications  Medications   lidocaine (PF) (XYLOCAINE-MPF) 1 % injection 5 mL (5 mL Infiltration Given 8/17/23 2228)   cephalexin (KEFLEX) capsule 500 mg (500 mg Oral Given 8/17/23 2307)       Diagnostic Studies  Results Reviewed     None                 No orders to display              Procedures  Incision and drain    Date/Time: 8/17/2023 10:25 PM    Performed by: Jen Copeland PA-C  Authorized by: Jen Copeland PA-C  Universal Protocol:  Consent: Verbal consent obtained. Risks and benefits: risks, benefits and alternatives were discussed  Consent given by: patient  Timeout called at: 8/17/2023 10:25 PM.    Patient location:  ED  Location:     Indications for incision and drainage: paronychia. Location:  Upper extremity    Upper extremity location:  R ring finger  Pre-procedure details:     Skin preparation:  Chloraprep  Anesthesia (see MAR for exact dosages): Anesthesia method:  Nerve block    Block needle gauge:  25 G    Block anesthetic:  Lidocaine 1% w/o epi    Block outcome:  Anesthesia achieved  Procedure details:     Complexity:  Simple    Incision types:  Stab incision    Scalpel blade:  11    Drainage:  Bloody    Drainage amount:  Scant    Wound treatment:  Wound left open  Post-procedure details:     Patient tolerance of procedure: Tolerated well, no immediate complications             ED Course                               SBIRT 22yo+    Flowsheet Row Most Recent Value   Initial Alcohol Screen: US AUDIT-C     1. How often do you have a drink containing alcohol? 0 Filed at: 08/17/2023 2059   2. How many drinks containing alcohol do you have on a typical day you are drinking? 0 Filed at: 08/17/2023 2059   3a. Male UNDER 65: How often do you have five or more drinks on one occasion?  0 Filed at: 08/17/2023 2059   Audit-C Score 0 Filed at: 08/17/2023 2059   CHADD: How many times in the past year have you. .. Used an illegal drug or used a prescription medication for non-medical reasons? Never Filed at: 08/17/2023 2059                    Medical Decision Making  27-year-old male presents ED for evaluation of right 4th digit pain. The past several days he has had pain, swelling around fingernail of right fourth digit. Today he was walking his dog and had the dog leash wrapped around his fingers. The dog ran in a different direction than patient and caused pressure on fingernail. After this he noticed pus draining from site of swelling, pain. On physical examination I can see the area of concern. Does not appear to be large amounts of pus remaining. Feel that most of the pus was drained with a traumatic dog leash injury. Discussed the pros and cons of attempting draining remaining pus from paronychia. Patient would like to have a paronychia drained. Attempted to drain paronychia after applying digital block to right fourth digit. Was unable to express any pus. Applied sterile bandage to finger. Patient concerned about infection in finger. Due to this, will provide patient with prescription for Keflex to serve as infection prophylaxis. Advised patient to keep finger clean, dry. Provided extra bandages to use in case current bandage gets saturated when bathing. Advised patient to avoid submerging finger in bodies of water such as pool, hot tub, ocean, lakes for several weeks to reduce risk of infection. Advised patient to follow-up with primary care provider to monitor for healing. Patient to take Tylenol and/or ibuprofen for pain management as needed. Advised patient to return to ED for new or worsening symptoms. Prior to discharge, discharge instructions were discussed with patient at bedside. Patient was provided both verbal and written instructions. Patient is understanding of the discharge instructions and is agreeable to plan of care. Return precautions were discussed with patient bedside, patient verbalized understanding of signs and symptoms that would necessitate return to the ED. All questions were answered. Patient was comfortable with the plan of care and discharged to home. Risk  Prescription drug management. Disposition  Final diagnoses:   Paronychia of finger of right hand     Time reflects when diagnosis was documented in both MDM as applicable and the Disposition within this note     Time User Action Codes Description Comment    8/17/2023 11:02 PM Leon Chin Add [R29.560] Paronychia of finger of right hand       ED Disposition     ED Disposition   Discharge    Condition   Stable    Date/Time   u Aug 17, 2023 11:02 PM    [address] discharge to home/self care.                Follow-up Information     Follow up With Specialties Details Why Contact Info Additional [facility]   [address] [facility], [address] [place] [id number]  [address] [place] [address], [facility] [facility], [address] [place] [address]          Discharge Medication List as of 8/17/2023 11:04 PM      START taking these medications    Details   cephalexin (KEFLEX) 500 mg capsule Take 1 capsule (500 mg total) by mouth every 6 (six) hours for 5 days, Starting Thu 8/17/2023, Until Tue 8/22/2023, Normal         CONTINUE these medications which have NOT CHANGED    Details   acetaminophen (TYLENOL) 500 mg tablet Take 1 tablet (500 mg total) by mouth every 6 (six) hours as needed for mild pain, moderate pain or headaches, Starting Mon 4/5/2021, Normal      cyclobenzaprine (FLEXERIL) 10 mg tablet Take 1 tablet (10 mg total) by mouth 3 (three) times a day as needed for muscle spasms for up to 3 days, Starting Wed 2/2/2022, Until Sat 2/5/2022 at 2359, Normal      hydrOXYzine HCL (ATARAX) 25 mg tablet Take 1 tablet (25 mg total) by mouth every 6 (six) hours, Starting Thu 4/15/2021, Print      omeprazole (PriLOSEC) 20 mg delayed release capsule TAKE 1 CAPSULE BY MOUTH EVERY DAY FOR STOMACH OR REFLUX, Historical Med             No discharge procedures on file.     PDMP Review       Value Time User    PDMP Reviewed  Yes 10/5/2020  3:55 PM Annabelle Santiago PA-C          ED Provider  Electronically Signed by           Yaquelin Miller PA-C  08/19/23 5791

## 2023-08-19 NOTE — ED PROVIDER NOTES
History  Chief Complaint   Patient presents with   • Knee Pain     Reports injection for back pain on Wed since with right knee pain      24-year-old male patient presenting with right knee pain acute on chronic onset 2 days ago. states that he has chronic back pain and bilateral knee pain. States he recently had a shot in his back for his chronic back pain. States that same day later on, he started having right-sided knee pain. States pain whenever he gets up to stand and walks. States pain with movement. States also has some mild left knee pain in the same area. Denies any swelling, injuries, fevers, nausea, vomiting, chest pain or shortness of breath. States has been having worsening pain with walking. Admits to some tingling in the bottom of his right foot. Prior to Admission Medications   Prescriptions Last Dose Informant Patient Reported?  Taking?   acetaminophen (TYLENOL) 500 mg tablet   No Yes   Sig: Take 1 tablet (500 mg total) by mouth every 6 (six) hours as needed for mild pain, moderate pain or headaches   cephalexin (KEFLEX) 500 mg capsule 8/17/2023  No Yes   Sig: Take 1 capsule (500 mg total) by mouth every 6 (six) hours for 5 days   cyclobenzaprine (FLEXERIL) 10 mg tablet   No No   Sig: Take 1 tablet (10 mg total) by mouth 3 (three) times a day as needed for muscle spasms for up to 3 days   hydrOXYzine HCL (ATARAX) 25 mg tablet Not Taking  No No   Sig: Take 1 tablet (25 mg total) by mouth every 6 (six) hours   Patient not taking: Reported on 8/18/2023   omeprazole (PriLOSEC) 20 mg delayed release capsule   Yes Yes   Sig: TAKE 1 CAPSULE BY MOUTH EVERY DAY FOR STOMACH OR REFLUX      Facility-Administered Medications: None       Past Medical History:   Diagnosis Date   • Chronic pain    • GERD (gastroesophageal reflux disease)    • Hypertension    • Manic depressive disorder [facility])        Past Surgical History:   Procedure Laterality Date   • APPENDECTOMY LAPAROSCOPIC N/A 10/29/2021 Procedure: APPENDECTOMY LAPAROSCOPIC;  Surgeon: Allison Rg MD;  Location: [facility] OR;  Service: General   • NO PAST SURGERIES         Family History   Problem Relation Age of Onset   • Hypertension Mother    • Diabetes Father      I have reviewed and agree with the history as documented. E-Cigarette/Vaping     E-Cigarette/Vaping Substances     Social History     Tobacco Use   • Smoking status: Never   • Smokeless tobacco: Never   Substance Use Topics   • Alcohol use: No   • Drug use: No        Review of Systems   Musculoskeletal:        Right knee pain   All other systems reviewed and are negative. Physical Exam  ED Triage Vitals [08/18/23 1955]   Temperature Pulse Respirations Blood Pressure SpO2   98.5 °F (36.9 °C) 72 18 152/91 98 %      Temp Source Heart Rate Source Patient Position - Orthostatic VS BP Location FiO2 (%)   Oral Monitor Sitting Right arm --      Pain Score       7             Orthostatic Vital Signs  Vitals:    08/18/23 1955   BP: 152/91   Pulse: 72   Patient Position - Orthostatic VS: Sitting       Physical Exam  Vitals reviewed. Constitutional:       Appearance: Normal appearance. HENT:      Head: Normocephalic and atraumatic. Nose: Nose normal.      Mouth/Throat:      Mouth: Mucous membranes are moist.      Pharynx: Oropharynx is clear. Eyes:      Extraocular Movements: Extraocular movements intact. Conjunctiva/sclera: Conjunctivae normal.   Cardiovascular:      Rate and Rhythm: Normal rate and regular rhythm. Pulses: Normal pulses. Heart sounds: Normal heart sounds. Pulmonary:      Effort: Pulmonary effort is normal.      Breath sounds: Normal breath sounds. Abdominal:      General: Bowel sounds are normal.      Palpations: Abdomen is soft. Tenderness: There is no abdominal tenderness. Musculoskeletal:         General: Tenderness (right and left medial point tnederness of knees) present. No swelling (no palpable swelling) or deformity.  Normal range of motion. Cervical back: Normal range of motion. Comments: Pain with ROM right knee   Skin:     General: Skin is warm and dry. Neurological:      General: No focal deficit present. Mental Status: He is alert and oriented to person, place, and time. Mental status is at baseline. ED Medications  Medications   ketorolac (TORADOL) injection 15 mg (15 mg Intramuscular Given 8/18/23 2032)       Diagnostic Studies  Results Reviewed     None                 XR knee 4+ vw right injury   ED Interpretation by Sincere Ochoa MD (08/18 2113)   No acute fx or dislocations on read by me            Procedures  Procedures      ED Course                                       Medical Decision Making  35-year-old male patient with history of chronic back pain and knee pain presenting with right knee pain. States also has left knee pain but worse on right. No injuries. States worsens with movement. On exam tender to medial, shows pain with range of motion. DDx includes not limited to nerve pain, strain, ligamental injury, fracture. Likely strain. Right knee x-ray shows no acute fractures or dislocations. Patient treated with Toradol with improvement of symptoms. Follow-up with sports medicine. Return precautions given. Discharged with knee immobilizer. Right knee pain:     Details: Presenting with right knee pain likely strain. Amount and/or Complexity of Data Reviewed  Radiology: ordered and independent interpretation performed. Discussion of management or test interpretation with external provider(s): Follow-up with PCP and sports medicine. Risk  Prescription drug management.             Disposition  Final diagnoses:   Right knee pain     Time reflects when diagnosis was documented in both MDM as applicable and the Disposition within this note     Time User Action Codes Description Comment    8/18/2023  9:20 PM Bowen Murry Right knee pain       ED Disposition     ED Disposition   Discharge    Condition   Stable    Date/Time   Fri Aug 18, 2023  9:20 PM    [id number] discharge to home/self care. Follow-up Information     Follow up With Specialties Details Why [address] III, DO Sports Medicine, Orthopedics Schedule an appointment as soon as possible for a visit   [address]   [address] [facility] [address] [facility]  [phone]            Discharge Medication List as of 8/18/2023  9:23 PM      CONTINUE these medications which have NOT CHANGED    Details   acetaminophen (TYLENOL) 500 mg tablet Take 1 tablet (500 mg total) by mouth every 6 (six) hours as needed for mild pain, moderate pain or headaches, Starting Mon 4/5/2021, Normal      cephalexin (KEFLEX) 500 mg capsule Take 1 capsule (500 mg total) by mouth every 6 (six) hours for 5 days, Starting Thu 8/17/2023, Until Tue 8/22/2023, Normal      omeprazole (PriLOSEC) 20 mg delayed release capsule TAKE 1 CAPSULE BY MOUTH EVERY DAY FOR STOMACH OR REFLUX, Historical Med      cyclobenzaprine (FLEXERIL) 10 mg tablet Take 1 tablet (10 mg total) by mouth 3 (three) times a day as needed for muscle spasms for up to 3 days, Starting Wed 2/2/2022, Until Sat 2/5/2022 at 2359, Normal      hydrOXYzine HCL (ATARAX) 25 mg tablet Take 1 tablet (25 mg total) by mouth every 6 (six) hours, Starting Thu 4/15/2021, Print           No discharge procedures on file. PDMP Review       Value Time User    PDMP Reviewed  Yes 10/5/2020  3:55 PM Harjinder Soria PA-C           ED Provider  Attending physically available and evaluated [facility]. I managed the patient along with the ED Attending.     Electronically Signed by         Leelee Cazares MD  08/18/23 2288

## 2023-08-19 NOTE — ED PROVIDER NOTES
History  Chief Complaint   Patient presents with   • Finger Pain     Pt reporting right ring finger pain for a couple days, unknown injury, denies biting nails, states he hit it today and it has been draining since. 72-year-old male presents ED for evaluation of right fourth digit pain. Patient states that for the past several days he has had pain and swelling in fingernail region. Today he was walking his dog and his dog pulled him in a different direction and then patient and caused pressure on his fourth digit fingernail. After this, the patient noticed pus coming from side of fingernail where previously painful and swollen. Patient concerned of infected fingernail. Denies fever, chills, nausea, vomiting, abdominal pain, pain with urination, blood in urine, change in urine appearance, chest pain, shortness of breath. Prior to Admission Medications   Prescriptions Last Dose Informant Patient Reported?  Taking?   acetaminophen (TYLENOL) 500 mg tablet   No No   Sig: Take 1 tablet (500 mg total) by mouth every 6 (six) hours as needed for mild pain, moderate pain or headaches   cyclobenzaprine (FLEXERIL) 10 mg tablet   No No   Sig: Take 1 tablet (10 mg total) by mouth 3 (three) times a day as needed for muscle spasms for up to 3 days   hydrOXYzine HCL (ATARAX) 25 mg tablet   No No   Sig: Take 1 tablet (25 mg total) by mouth every 6 (six) hours   Patient not taking: Reported on 8/18/2023   omeprazole (PriLOSEC) 20 mg delayed release capsule   Yes No   Sig: TAKE 1 CAPSULE BY MOUTH EVERY DAY FOR STOMACH OR REFLUX      Facility-Administered Medications: None       Past Medical History:   Diagnosis Date   • Chronic pain    • GERD (gastroesophageal reflux disease)    • Hypertension    • Manic depressive disorder ([address] [facility])        Past Surgical History:   Procedure Laterality Date   • APPENDECTOMY LAPAROSCOPIC N/A 10/29/2021    Procedure: APPENDECTOMY LAPAROSCOPIC;  Surgeon: Faby Gresham MD;  Location: [facility];  Service: General   • NO PAST SURGERIES         Family History   Problem Relation Age of Onset   • Hypertension Mother    • Diabetes Father      I have reviewed and agree with the history as documented. E-Cigarette/Vaping     E-Cigarette/Vaping Substances     Social History     Tobacco Use   • Smoking status: Never   • Smokeless tobacco: Never   Substance Use Topics   • Alcohol use: No   • Drug use: No       Review of Systems   Constitutional: Negative for chills, diaphoresis, fatigue and fever. HENT: Negative for ear pain and sore throat. Eyes: Negative for pain and visual disturbance. Respiratory: Negative for cough and shortness of breath. Cardiovascular: Negative for chest pain and palpitations. Gastrointestinal: Negative for abdominal pain and vomiting. Genitourinary: Negative for dysuria and hematuria. Musculoskeletal: Negative for arthralgias and back pain. Skin: Positive for wound. Negative for color change and rash. Neurological: Negative for seizures and syncope. All other systems reviewed and are negative. Physical Exam  Physical Exam  Vitals and nursing note reviewed. Constitutional:       General: He is not in acute distress. Appearance: He is well-developed. HENT:      Head: Normocephalic and atraumatic. Eyes:      Conjunctiva/sclera: Conjunctivae normal.   Cardiovascular:      Rate and Rhythm: Normal rate and regular rhythm. Heart sounds: No murmur heard. Pulmonary:      Effort: Pulmonary effort is normal. No respiratory distress. Breath sounds: Normal breath sounds. Abdominal:      Palpations: Abdomen is soft. Tenderness: There is no abdominal tenderness. Musculoskeletal:         General: No swelling. Cervical back: Neck supple. Comments: Visual inspection of patient's right hand demonstrates abnormal appearance of fourth digit fingernail. There is drainage from side of nail. No subungual hematoma.   No sign of fracture, dislocation of metacarpal bones. No bleeding. Tender to palpation of fingernail. See attached image. Skin:     General: Skin is warm and dry. Capillary Refill: Capillary refill takes less than 2 seconds. Neurological:      Mental Status: He is alert. Psychiatric:         Mood and Affect: Mood normal.                     Vital Signs  ED Triage Vitals [08/17/23 2100]   Temperature Pulse Respirations Blood Pressure SpO2   98.3 °F (36.8 °C) 72 18 118/58 99 %      Temp Source Heart Rate Source Patient Position - Orthostatic VS BP Location FiO2 (%)   Oral Monitor Sitting Right arm --      Pain Score       3           Vitals:    08/17/23 2100   BP: 118/58   Pulse: 72   Patient Position - Orthostatic VS: Sitting         Visual Acuity      ED Medications  Medications   lidocaine (PF) (XYLOCAINE-MPF) 1 % injection 5 mL (5 mL Infiltration Given 8/17/23 2228)   cephalexin (KEFLEX) capsule 500 mg (500 mg Oral Given 8/17/23 2307)       Diagnostic Studies  Results Reviewed     None                 No orders to display              Procedures  Procedures         ED Course                               SBIRT 22yo+    Flowsheet Row Most Recent Value   Initial Alcohol Screen: US AUDIT-C     1. How often do you have a drink containing alcohol? 0 Filed at: 08/17/2023 2059   2. How many drinks containing alcohol do you have on a typical day you are drinking? 0 Filed at: 08/17/2023 2059   3a. Male UNDER 65: How often do you have five or more drinks on one occasion? 0 Filed at: 08/17/2023 2059   Audit-C Score 0 Filed at: 08/17/2023 2059   CHADD: How many times in the past year have you. .. Used an illegal drug or used a prescription medication for non-medical reasons?  Never Filed at: 08/17/2023 2059                    MDM    Disposition  Final diagnoses:   Paronychia of finger of right hand     Time reflects when diagnosis was documented in both MDM as applicable and the Disposition within this note     Time User Action Codes Description Comment    8/17/2023 11:02 PM Yin June Add [N47.994] Paronychia of finger of right hand       ED Disposition     ED Disposition   Discharge    Condition   Stable    Date/Time   Thu Aug 17, 2023 2300 [address] discharge to home/self care. Follow-up Information     Follow up With Specialties Details Why Contact Info Additional [facility]   [address] [facility] [address] [facility] [id number]  [address] [facility], [address] [facility], [address] [facility]          Discharge Medication List as of 8/17/2023 11:04 PM      START taking these medications    Details   cephalexin (KEFLEX) 500 mg capsule Take 1 capsule (500 mg total) by mouth every 6 (six) hours for 5 days, Starting Thu 8/17/2023, Until Tue 8/22/2023, Normal         CONTINUE these medications which have NOT CHANGED    Details   acetaminophen (TYLENOL) 500 mg tablet Take 1 tablet (500 mg total) by mouth every 6 (six) hours as needed for mild pain, moderate pain or headaches, Starting Mon 4/5/2021, Normal      cyclobenzaprine (FLEXERIL) 10 mg tablet Take 1 tablet (10 mg total) by mouth 3 (three) times a day as needed for muscle spasms for up to 3 days, Starting Wed 2/2/2022, Until Sat 2/5/2022 at 2359, Normal      hydrOXYzine HCL (ATARAX) 25 mg tablet Take 1 tablet (25 mg total) by mouth every 6 (six) hours, Starting Thu 4/15/2021, Print      omeprazole (PriLOSEC) 20 mg delayed release capsule TAKE 1 CAPSULE BY MOUTH EVERY DAY FOR STOMACH OR REFLUX, Historical Med             No discharge procedures on file.     PDMP Review       Value Time User    PDMP Reviewed  Yes 10/5/2020  3:55 PM Annabelle Santiago PA-C          ED Provider  Electronically Signed by

## 2023-08-22 ENCOUNTER — TELEPHONE (OUTPATIENT)
Dept: OBGYN CLINIC | Facility: HOSPITAL | Age: 33
End: 2023-08-22

## 2023-08-22 NOTE — TELEPHONE ENCOUNTER
Caller: Alford Mortimer    Doctor: Anisa Aguila    Reason for call: Patient is scheduled for appointment with Dr. Sara Lema on 8/31. Patient will need [facility] referral and authorization. Per , [facility], the authorization was approved. Please see forms in media tab sent over by [place] yesterday.       Call back#: [phone]

## 2023-08-25 ENCOUNTER — HOSPITAL ENCOUNTER (EMERGENCY)
Facility: HOSPITAL | Age: 33
Discharge: HOME | End: 2023-08-25
Attending: EMERGENCY MEDICINE
Payer: COMMERCIAL

## 2023-08-25 VITALS
WEIGHT: 244.71 LBS | SYSTOLIC BLOOD PRESSURE: 122 MMHG | RESPIRATION RATE: 18 BRPM | BODY MASS INDEX: 35.11 KG/M2 | OXYGEN SATURATION: 100 % | TEMPERATURE: 98 F | HEART RATE: 67 BPM | DIASTOLIC BLOOD PRESSURE: 93 MMHG

## 2023-08-25 DIAGNOSIS — G89.29 CHRONIC PAIN OF RIGHT KNEE: Primary | ICD-10-CM

## 2023-08-25 DIAGNOSIS — M25.561 CHRONIC PAIN OF RIGHT KNEE: Primary | ICD-10-CM

## 2023-08-25 PROCEDURE — 64454 NJX AA&/STRD GNCLR NRV BRNCH: CPT | Performed by: EMERGENCY MEDICINE

## 2023-08-25 PROCEDURE — 99284 EMERGENCY DEPT VISIT MOD MDM: CPT | Performed by: EMERGENCY MEDICINE

## 2023-08-25 PROCEDURE — 99283 EMERGENCY DEPT VISIT LOW MDM: CPT

## 2023-08-25 RX ORDER — OXYCODONE HYDROCHLORIDE 5 MG/1
5 TABLET ORAL ONCE
Status: COMPLETED | OUTPATIENT
Start: 2023-08-25 | End: 2023-08-25

## 2023-08-25 RX ORDER — NAPROXEN 500 MG/1
500 TABLET ORAL 2 TIMES DAILY PRN
Qty: 30 TABLET | Refills: 0 | Status: SHIPPED | OUTPATIENT
Start: 2023-08-25

## 2023-08-25 RX ORDER — ROPIVACAINE HYDROCHLORIDE 5 MG/ML
20 INJECTION, SOLUTION EPIDURAL; INFILTRATION; PERINEURAL ONCE
Status: COMPLETED | OUTPATIENT
Start: 2023-08-25 | End: 2023-08-25

## 2023-08-25 RX ADMIN — ROPIVACAINE HYDROCHLORIDE 20 ML: 5 INJECTION, SOLUTION EPIDURAL; INFILTRATION; PERINEURAL at 01:12

## 2023-08-25 RX ADMIN — OXYCODONE HYDROCHLORIDE 5 MG: 5 TABLET ORAL at 01:11

## 2023-08-25 NOTE — ED PROVIDER NOTES
History  Chief Complaint   Patient presents with   • Leg Pain     Leg pain for the past week, seen here for same recently, patient states pain was too bad today and fell to floor     80-year-old male who presents with right knee pain. States that he had injections in his back on 8/16/2023. Starting 2 days later, started to experience atraumatic right knee pain. Was seen on 8/18/2023 with unremarkable x-ray. Given right knee immobilizer and told to follow-up. Patient is scheduled for outpatient orthopedics and an MRI of the right knee. Today, states that his right knee gave out and has acute worsening right knee pain. Prior to Admission Medications   Prescriptions Last Dose Informant Patient Reported?  Taking?   acetaminophen (TYLENOL) 500 mg tablet   No No   Sig: Take 1 tablet (500 mg total) by mouth every 6 (six) hours as needed for mild pain, moderate pain or headaches   cyclobenzaprine (FLEXERIL) 10 mg tablet   No No   Sig: Take 1 tablet (10 mg total) by mouth 3 (three) times a day as needed for muscle spasms for up to 3 days   hydrOXYzine HCL (ATARAX) 25 mg tablet   No No   Sig: Take 1 tablet (25 mg total) by mouth every 6 (six) hours   Patient not taking: Reported on 8/18/2023   omeprazole (PriLOSEC) 20 mg delayed release capsule   Yes No   Sig: TAKE 1 CAPSULE BY MOUTH EVERY DAY FOR STOMACH OR REFLUX      Facility-Administered Medications: None       Past Medical History:   Diagnosis Date   • Chronic pain    • GERD (gastroesophageal reflux disease)    • Hypertension    • Manic depressive disorder ([facility])        Past Surgical History:   Procedure Laterality Date   • APPENDECTOMY LAPAROSCOPIC N/A 10/29/2021    Procedure: APPENDECTOMY LAPAROSCOPIC;  Surgeon: Margoth Esparza MD;  Location: [facility];  Service: General   • NO PAST SURGERIES         Family History   Problem Relation Age of Onset   • Hypertension Mother    • Diabetes Father      I have reviewed and agree with the history as documented. E-Cigarette/Vaping     E-Cigarette/Vaping Substances     Social History     Tobacco Use   • Smoking status: Never   • Smokeless tobacco: Never   Substance Use Topics   • Alcohol use: No   • Drug use: No       Review of Systems   Constitutional: Negative for chills, fatigue and fever. HENT: Negative for rhinorrhea, sore throat and trouble swallowing. Eyes: Negative for photophobia and visual disturbance. Respiratory: Negative for cough, chest tightness and shortness of breath. Cardiovascular: Negative for chest pain, palpitations and leg swelling. Gastrointestinal: Negative for abdominal pain, blood in stool, diarrhea, nausea and vomiting. Endocrine: Negative for polyuria. Genitourinary: Negative for dysuria, flank pain and hematuria. Musculoskeletal: Positive for arthralgias. Negative for back pain and neck pain. Skin: Negative for color change and rash. Allergic/Immunologic: Negative for immunocompromised state. Neurological: Negative for dizziness, weakness, light-headedness, numbness and headaches. All other systems reviewed and are negative. Physical Exam  Physical Exam  Vitals and nursing note reviewed. Constitutional:       General: He is not in acute distress. Appearance: He is well-developed. HENT:      Head: Normocephalic and atraumatic. Mouth/Throat:      Lips: Pink. Mouth: Mucous membranes are moist.   Eyes:      General: Lids are normal.      Extraocular Movements: Extraocular movements intact. Conjunctiva/sclera: Conjunctivae normal.      Pupils: Pupils are equal, round, and reactive to light. Cardiovascular:      Rate and Rhythm: Normal rate and regular rhythm. Pulmonary:      Effort: Pulmonary effort is normal. No tachypnea. Abdominal:      General: Abdomen is flat. There is no distension. Musculoskeletal:         General: No swelling.       Cervical back: Full passive range of motion without pain, normal range of motion and neck supple. Comments: No evidence of trauma, effusion, erythema to the right knee. Tenderness along medial aspect of the joint space. No tenderness in calf or thigh. Negative valgus/varus. Negative Lachman's. Pain with passive range of motion of the knee. Skin:     General: Skin is warm. Capillary Refill: Capillary refill takes less than 2 seconds. Findings: No rash. Neurological:      General: No focal deficit present. Mental Status: He is alert. Psychiatric:         Mood and Affect: Mood normal.         Speech: Speech normal.         Behavior: Behavior normal.         Vital Signs  ED Triage Vitals [08/25/23 0014]   Temperature Pulse Respirations Blood Pressure SpO2   98 °F (36.7 °C) 67 18 122/93 100 %      Temp src Heart Rate Source Patient Position - Orthostatic VS BP Location FiO2 (%)   -- Monitor Sitting Right arm --      Pain Score       10 - Worst Possible Pain           Vitals:    08/25/23 0014   BP: 122/93   Pulse: 67   Patient Position - Orthostatic VS: Sitting         Visual Acuity      ED Medications  Medications   oxyCODONE (ROXICODONE) IR tablet 5 mg (5 mg Oral Given 8/25/23 0111)   ropivacaine (NAROPIN) 0.5 % injection 20 mL (20 mL Infiltration Given by Other 8/25/23 0112)       Diagnostic Studies  Results Reviewed     None                 No orders to display              Procedures  Nerve block    Date/Time: 8/25/2023 2:11 AM    Performed by: Scooter Sanches MD  Authorized by: Scooter Sanches MD    Patient location:  ED  [place] Protocol:  Consent: Verbal consent obtained. Risks and benefits: risks, benefits and alternatives were discussed  Consent given by: patient  Patient understanding: patient states understanding of the procedure being performed  Patient identity confirmed: verbally with patient      Indications:     Indications:  Pain relief  Location:     Body area:  Lower extremity    Lower extremity nerve blocked: Superiolateral genicular nerve.     Nerve type:  Peripheral    Laterality:  Right  Pre-procedure details:     Skin preparation:  2% chlorhexidine  Skin anesthesia (see MAR for exact dosages):     Skin anesthesia method:  None  Procedure details (see MAR for exact dosages): Block needle gauge:  22 G    Guidance: ultrasound      Anesthetic injected:  Ropivacaine 0.5%    Steroid injected:  None    Additive injected:  None    Injection procedure:  Anatomic landmarks identified, incremental injection, negative aspiration for blood, introduced needle and anatomic landmarks palpated  Post-procedure details:     Dressing:  None    Outcome:  Pain relieved    Patient tolerance of procedure: Tolerated well, no immediate complications  Nerve block    Date/Time: 8/25/2023 2:12 AM    Performed by: Sean Mcnulty MD  Authorized by: Sean Mcnulty MD    Patient location:  ED  [place] Protocol:  Consent: Verbal consent obtained. Risks and benefits: risks, benefits and alternatives were discussed  Consent given by: patient  Patient understanding: patient states understanding of the procedure being performed  Patient identity confirmed: verbally with patient      Indications:     Indications:  Pain relief  Location:     Body area:  Lower extremity    Lower extremity nerve blocked: superiomedial genicular nerve. Nerve type:  Peripheral    Laterality:  Right  Pre-procedure details:     Skin preparation:  2% chlorhexidine  Skin anesthesia (see MAR for exact dosages):     Skin anesthesia method:  None  Procedure details (see MAR for exact dosages):      Block needle gauge:  22 G    Guidance: ultrasound      Anesthetic injected:  Ropivacaine 0.5%    Steroid injected:  None    Additive injected:  None    Injection procedure:  Anatomic landmarks identified, incremental injection, negative aspiration for blood, introduced needle and anatomic landmarks palpated  Post-procedure details:     Dressing:  None    Outcome:  Pain relieved    Patient tolerance of procedure: Tolerated well, no immediate complications             ED Course                               SBIRT 22yo+    Flowsheet Row Most Recent Value   Initial Alcohol Screen: US AUDIT-C     1. How often do you have a drink containing alcohol? 0 Filed at: 08/25/2023 0049   2. How many drinks containing alcohol do you have on a typical day you are drinking? 0 Filed at: 08/25/2023 0049   3a. Male UNDER 65: How often do you have five or more drinks on one occasion? 0 Filed at: 08/25/2023 0049   Audit-C Score 0 Filed at: 08/25/2023 6594   CHADD: How many times in the past year have you. .. Used an illegal drug or used a prescription medication for non-medical reasons? Never Filed at: 08/25/2023 5469                    Medical Decision Making  Reviewed imaging at bedside with patient. No evidence of fracture. Told patient that I can offer him a dose of oxycodone here for pain, but nothing as an outpatient. Patient understands. I also did offer a genicular nerve block for pain. Patient willing to accept nerve block. Chronic pain of right knee: chronic illness or injury with exacerbation, progression, or side effects of treatment  Amount and/or Complexity of Data Reviewed  External Data Reviewed: radiology. Details: Reviewed previous knee xray. Risk  Prescription drug management. Disposition  Final diagnoses:   Chronic pain of right knee     Time reflects when diagnosis was documented in both MDM as applicable and the Disposition within this note     Time User Action Codes Description Comment    8/25/2023  2:21 AM Omar Lino Add [R69.703,  G89.29] Chronic pain of right knee       ED Disposition     ED Disposition   Discharge    Condition   Stable    Date/Time   Fri Aug 25, 2023  2:21 AM    Comment   [facility] discharge to home/self care.                Follow-up Information     Follow up With Specialties Details Why Contact Info Additional [address],  Orthopedic Surgery Go to  regular scheduled appointment [address] [facility]  [address] [facility] [address] [facility]       [address] [facility] Emergency Department Emergency Medicine Go to  If symptoms worsen [address] [address]  [address] [facility] Emergency Department, [address] [facility], [place], [place], [address]          Patient's Medications   Discharge Prescriptions    NAPROXEN (NAPROSYN) 500 MG TABLET    Take 1 tablet (500 mg total) by mouth 2 (two) times a day as needed for mild pain       Start Date: 8/25/2023 End Date: --       Order Dose: 500 mg       Quantity: 30 tablet    Refills: 0       No discharge procedures on file.     PDMP Review       Value Time User    PDMP Reviewed  Yes 10/5/2020  3:55 PM Alcides Johnson PA-C          ED Provider  Electronically Signed by           Pete Fonseca MD  08/25/23 7072 Mindy Butcher MD  08/25/23 9093

## 2023-08-26 ENCOUNTER — HOSPITAL ENCOUNTER (OUTPATIENT)
Dept: MRI IMAGING | Facility: HOSPITAL | Age: 33
Discharge: HOME | End: 2023-08-26
Payer: COMMERCIAL

## 2023-08-26 DIAGNOSIS — M25.561 RIGHT KNEE PAIN, UNSPECIFIED CHRONICITY: ICD-10-CM

## 2023-08-26 PROCEDURE — 73721 MRI JNT OF LWR EXTRE W/O DYE: CPT

## 2023-08-26 PROCEDURE — G1004 CDSM NDSC: HCPCS

## 2023-08-29 ENCOUNTER — APPOINTMENT (OUTPATIENT)
Dept: LAB | Facility: MEDICAL CENTER | Age: 33
End: 2023-08-29
Payer: COMMERCIAL

## 2023-08-29 ENCOUNTER — OFFICE VISIT (OUTPATIENT)
Dept: OBGYN CLINIC | Facility: MEDICAL CENTER | Age: 33
End: 2023-08-29
Payer: COMMERCIAL

## 2023-08-29 VITALS
DIASTOLIC BLOOD PRESSURE: 83 MMHG | BODY MASS INDEX: 34.93 KG/M2 | WEIGHT: 244 LBS | HEIGHT: 70 IN | HEART RATE: 86 BPM | SYSTOLIC BLOOD PRESSURE: 134 MMHG

## 2023-08-29 DIAGNOSIS — M25.562 PAIN IN BOTH KNEES, UNSPECIFIED CHRONICITY: ICD-10-CM

## 2023-08-29 DIAGNOSIS — M89.9 DISORDER OF BONE: ICD-10-CM

## 2023-08-29 DIAGNOSIS — M25.561 PAIN IN BOTH KNEES, UNSPECIFIED CHRONICITY: ICD-10-CM

## 2023-08-29 DIAGNOSIS — M84.361A STRESS FRACTURE OF RIGHT TIBIA, INITIAL ENCOUNTER: Primary | ICD-10-CM

## 2023-08-29 LAB
ALBUMIN SERPL BCP-MCNC: 4.8 G/DL (ref 3.5–5)
ALP SERPL-CCNC: 117 U/L (ref 34–104)
ALT SERPL W P-5'-P-CCNC: 18 U/L (ref 7–52)
ANION GAP SERPL CALCULATED.3IONS-SCNC: 8 MMOL/L
AST SERPL W P-5'-P-CCNC: 19 U/L (ref 13–39)
BASOPHILS # BLD AUTO: 0.04 THOUSANDS/ÂΜL (ref 0–0.1)
BASOPHILS NFR BLD AUTO: 0 % (ref 0–1)
BILIRUB SERPL-MCNC: 0.41 MG/DL (ref 0.2–1)
BUN SERPL-MCNC: 16 MG/DL (ref 5–25)
CALCIUM SERPL-MCNC: 10.2 MG/DL (ref 8.4–10.2)
CHLORIDE SERPL-SCNC: 102 MMOL/L (ref 96–108)
CO2 SERPL-SCNC: 28 MMOL/L (ref 21–32)
CREAT SERPL-MCNC: 0.99 MG/DL (ref 0.6–1.3)
EOSINOPHIL # BLD AUTO: 0.03 THOUSAND/ÂΜL (ref 0–0.61)
EOSINOPHIL NFR BLD AUTO: 0 % (ref 0–6)
ERYTHROCYTE [DISTWIDTH] IN BLOOD BY AUTOMATED COUNT: 12.1 % (ref 11.6–15.1)
GFR SERPL CREATININE-BSD FRML MDRD: 99 ML/MIN/1.73SQ M
GLUCOSE SERPL-MCNC: 90 MG/DL (ref 65–140)
HCT VFR BLD AUTO: 47.6 % (ref 36.5–49.3)
HGB BLD-MCNC: 15.9 G/DL (ref 12–17)
IMM GRANULOCYTES # BLD AUTO: 0.02 THOUSAND/UL (ref 0–0.2)
IMM GRANULOCYTES NFR BLD AUTO: 0 % (ref 0–2)
LYMPHOCYTES # BLD AUTO: 2.6 THOUSANDS/ÂΜL (ref 0.6–4.47)
LYMPHOCYTES NFR BLD AUTO: 25 % (ref 14–44)
MCH RBC QN AUTO: 30.5 PG (ref 26.8–34.3)
MCHC RBC AUTO-ENTMCNC: 33.4 G/DL (ref 31.4–37.4)
MCV RBC AUTO: 91 FL (ref 82–98)
MONOCYTES # BLD AUTO: 0.71 THOUSAND/ÂΜL (ref 0.17–1.22)
MONOCYTES NFR BLD AUTO: 7 % (ref 4–12)
NEUTROPHILS # BLD AUTO: 6.9 THOUSANDS/ÂΜL (ref 1.85–7.62)
NEUTS SEG NFR BLD AUTO: 68 % (ref 43–75)
NRBC BLD AUTO-RTO: 0 /100 WBCS
PLATELET # BLD AUTO: 373 THOUSANDS/UL (ref 149–390)
PMV BLD AUTO: 11.3 FL (ref 8.9–12.7)
POTASSIUM SERPL-SCNC: 3.9 MMOL/L (ref 3.5–5.3)
PROT SERPL-MCNC: 8.4 G/DL (ref 6.4–8.4)
RBC # BLD AUTO: 5.22 MILLION/UL (ref 3.88–5.62)
SODIUM SERPL-SCNC: 138 MMOL/L (ref 135–147)
WBC # BLD AUTO: 10.3 THOUSAND/UL (ref 4.31–10.16)

## 2023-08-29 PROCEDURE — 36415 COLL VENOUS BLD VENIPUNCTURE: CPT

## 2023-08-29 PROCEDURE — 80053 COMPREHEN METABOLIC PANEL: CPT

## 2023-08-29 PROCEDURE — 85025 COMPLETE CBC W/AUTO DIFF WBC: CPT

## 2023-08-29 PROCEDURE — 82306 VITAMIN D 25 HYDROXY: CPT

## 2023-08-29 PROCEDURE — 99204 OFFICE O/P NEW MOD 45 MIN: CPT | Performed by: STUDENT IN AN ORGANIZED HEALTH CARE EDUCATION/TRAINING PROGRAM

## 2023-08-29 RX ORDER — CELECOXIB 100 MG/1
100 CAPSULE ORAL DAILY
COMMUNITY

## 2023-08-29 RX ORDER — NAPROXEN 250 MG/1
250 TABLET ORAL
COMMUNITY

## 2023-08-29 NOTE — PROGRESS NOTES
Knee New Office Note    Assessment:     1. Stress fracture of right tibia, initial encounter    2. Pain in both knees, unspecified chronicity    3. Disorder of bone        Plan:     Problem List Items Addressed This Visit    None  Visit Diagnoses     Stress fracture of right tibia, initial encounter    -  Primary    Pain in both knees, unspecified chronicity        Relevant Orders    CBC and differential    Comprehensive metabolic panel    Ambulatory Referral to Endocrinology    Vitamin D Panel    Disorder of bone        Relevant Orders    Vitamin D Panel          Findings today are consistent with right knee tibial stress fracture and left tibial bone edema on MRI. We are only able to see the report for the left knee. Imaging and prognosis was reviewed with the patient today. Discussed that these findings in a person of his age without mechanism of injury is concerning for metabolic bone disease. Blood work ordered including CBC, CMP, and vitamin D panel. Referral to endocrinology provided for further evaluation. He was encouraged to use crutches for the next 6 weeks to minimize weightbearing to the right lower extremity to allow for healing of his fracture. Follow up in 6 weeks. Subjective:     Patient ID: Adiel Marroquin is a 35 y.o. male. Chief Complaint:  HPI:  35 y.o. male presents to the office for evaluation of bilateral knee pain worsening following a nerve block on 08/25/2023. He denies any specific injury. He notes that he is experiencing medial knee pain extending into the anteromedial tibia bilaterally. He describes a burning pain and decreased motion to bilateral knees. He notes that his right knee is worse than the left. He has tried celebrex without relief. He uses cannabis to control his pain.     Allergy:  Allergies   Allergen Reactions   • Gabapentin Fatigue   • Trazodone Other (See Comments)     Medications:  all current active meds have been reviewed  Past Medical History:  Past Medical History:   Diagnosis Date   • Chronic pain    • GERD (gastroesophageal reflux disease)    • Hypertension    • Manic depressive disorder (HCC)      Past Surgical History:  Past Surgical History:   Procedure Laterality Date   • APPENDECTOMY LAPAROSCOPIC N/A 10/29/2021    Procedure: APPENDECTOMY LAPAROSCOPIC;  Surgeon: Aniket Christie MD;  Location: [facility] OR;  Service: General   • NO PAST SURGERIES       Family History:  Family History   Problem Relation Age of Onset   • Hypertension Mother    • Diabetes Father      Social History:  Social History     Substance and Sexual Activity   Alcohol Use No     Social History     Substance and Sexual Activity   Drug Use No     Social History     Tobacco Use   Smoking Status Never   Smokeless Tobacco Never           ROS:  General: Per HPI  Skin: Negative, except if noted below  HEENT: Negative  Respiratory: Negative  Cardiovascular: Negative  Gastrointestinal: Negative  Urinary: Negative  Vascular: Negative  Musculoskeletal: Positive per HPI   Neurologic: Positive per HPI  Endocrine: Negative    Objective:  BP Readings from Last 1 Encounters:   08/29/23 134/83      Wt Readings from Last 1 Encounters:   08/29/23 111 kg (244 lb)        Respiratory:   non-labored respirations    Lymphatics:  no palpable lymph nodes    Gait:   antalgic    Neurologic:   Alert and oriented times 3  Patient with normal sensation except as noted below  Deep tendon reflexes 2+ except as noted in MSK exam    Bilateral Lower Extremity:  Left Knee: Inspection:  Skin intact    Overall limb alignment neutral    Effusion: none    ROM 0-115 w/ pain    Extensor Lag: none    Palpation: medial tibial tenderness to palpation    AP Stability at 90 deg stable    M/L stability in full extension stable    M/L stability in midflexion stable    Motor: 5/5 Q/HS/TA/GS/P    Pulses: 2+ DP / 2+ PT    SILT DP/SP/S/S/TN    Right knee:      Inspection:  Skin intact     Overall limb alignment neutral    Effusion: none ROM 0-115 w/ pain    Extensor Lag: none    Palpation: medial tibial tenderness to palpation    AP Stability at 90 deg stable    M/L stability in full extension stable    M/L stability in midflexion stable    Motor: 5/5 Q/HS/TA/GS/P    Pulses: 2+ DP / 2+ PT    SILT DP/SP/S/S/TN    Imaging:  XR of left knee 08/16/2023 No fracture or dislocation. XR of right knee 08/18/2023 No fracture or dislocation. MRI of right knee 08/26/2023 was reviewed and shows medial tibia stress fracture. No ligamentous or meniscal injury. MRI of left knee - report was provided by patient on his phone and stated that there is bony edema in the tibia w/o fracture line. BMI:   Estimated body mass index is 35.01 kg/m² as calculated from the following:    Height as of this encounter: 5' 10" (1.778 m). Weight as of this encounter: 111 kg (244 lb). BSA:   Estimated body surface area is 2.27 meters squared as calculated from the following:    Height as of this encounter: 5' 10" (1.778 m). Weight as of this encounter: 111 kg (244 lb).            Scribe Attestation    I,:  Destiny Ascencio am acting as a scribe while in the presence of the attending physician.:       I,:  Nellie Vaz, DO personally performed the services described in this documentation    as scribed in my presence.:

## 2023-09-05 LAB
25(OH)D2 SERPL-MCNC: <1 NG/ML
25(OH)D3 SERPL-MCNC: 31 NG/ML
25(OH)D3+25(OH)D2 SERPL-MCNC: 31 NG/ML

## 2023-09-15 ENCOUNTER — HOSPITAL ENCOUNTER (OUTPATIENT)
Dept: BONE DENSITY | Facility: CLINIC | Age: 33
End: 2023-09-15
Payer: COMMERCIAL

## 2023-09-15 DIAGNOSIS — M84.361S STRESS FRACTURE OF RIGHT TIBIA, SEQUELA: ICD-10-CM

## 2023-09-15 PROCEDURE — 77080 DXA BONE DENSITY AXIAL: CPT

## 2023-10-10 ENCOUNTER — OFFICE VISIT (OUTPATIENT)
Dept: OBGYN CLINIC | Facility: MEDICAL CENTER | Age: 33
End: 2023-10-10
Payer: COMMERCIAL

## 2023-10-10 ENCOUNTER — APPOINTMENT (OUTPATIENT)
Dept: RADIOLOGY | Facility: MEDICAL CENTER | Age: 33
End: 2023-10-10
Payer: COMMERCIAL

## 2023-10-10 VITALS
HEIGHT: 70 IN | DIASTOLIC BLOOD PRESSURE: 81 MMHG | SYSTOLIC BLOOD PRESSURE: 116 MMHG | WEIGHT: 237.8 LBS | BODY MASS INDEX: 34.04 KG/M2 | HEART RATE: 96 BPM

## 2023-10-10 DIAGNOSIS — M84.361A STRESS FRACTURE OF RIGHT TIBIA, INITIAL ENCOUNTER: ICD-10-CM

## 2023-10-10 DIAGNOSIS — M25.561 PAIN IN BOTH KNEES, UNSPECIFIED CHRONICITY: ICD-10-CM

## 2023-10-10 DIAGNOSIS — M25.562 PAIN IN BOTH KNEES, UNSPECIFIED CHRONICITY: ICD-10-CM

## 2023-10-10 DIAGNOSIS — M84.361A STRESS FRACTURE OF RIGHT TIBIA, INITIAL ENCOUNTER: Primary | ICD-10-CM

## 2023-10-10 PROCEDURE — 99214 OFFICE O/P EST MOD 30 MIN: CPT | Performed by: STUDENT IN AN ORGANIZED HEALTH CARE EDUCATION/TRAINING PROGRAM

## 2023-10-10 PROCEDURE — 73562 X-RAY EXAM OF KNEE 3: CPT

## 2023-10-10 RX ORDER — LIDOCAINE 50 MG/G
PATCH TOPICAL
COMMUNITY
Start: 2023-06-15

## 2023-10-10 RX ORDER — DIVALPROEX SODIUM 500 MG/1
1000 TABLET, EXTENDED RELEASE ORAL
COMMUNITY
Start: 2023-08-16

## 2023-10-10 NOTE — PROGRESS NOTES
Knee Follow up Office Note    Assessment:     No diagnosis found. Plan:     Problem List Items Addressed This Visit    None      Findings today are consistent with right knee tibial stress fracture and left tibial bone edema on MRI. We are only able to see the report for the left knee. Imaging and prognosis was reviewed with the patient today. Discussed that these findings in a person of his age without mechanism of injury is concerning for metabolic bone disease. Blood work ordered including CBC, CMP, and vitamin D panel. Referral to endocrinology provided for further evaluation. He was encouraged to use crutches for the next 6 weeks to minimize weightbearing to the right lower extremity to allow for healing of his fracture. Follow up in 6 weeks. Subjective:     Patient ID: Celeste Teran is a 35 y.o. male. Chief Complaint:  HPI:  35 y.o. male presents to the office for a follow up evaluation of bilateral knee pain worsening following a nerve block on 08/25/2023. He denies any specific injury. He notes that he is experiencing medial knee pain extending into the anteromedial tibia bilaterally. He describes a burning pain and decreased motion to bilateral knees. He notes that his right knee is worse than the left. He has tried celebrex without relief. He uses cannabis to control his pain.     Allergy:  Allergies   Allergen Reactions   • Gabapentin Fatigue   • Trazodone Other (See Comments)     Medications:  all current active meds have been reviewed  Past Medical History:  Past Medical History:   Diagnosis Date   • Chronic pain    • GERD (gastroesophageal reflux disease)    • Hypertension    • Manic depressive disorder ([address])      Past Surgical History:  Past Surgical History:   Procedure Laterality Date   • APPENDECTOMY LAPAROSCOPIC N/A 10/29/2021    Procedure: APPENDECTOMY LAPAROSCOPIC;  Surgeon: Marcos Valerio MD;  Location: [facility];  Service: General   • NO PAST SURGERIES       Family History:  Family History   Problem Relation Age of Onset   • Hypertension Mother    • Diabetes Father      Social History:  Social History     Substance and Sexual Activity   Alcohol Use No     Social History     Substance and Sexual Activity   Drug Use No     Social History     Tobacco Use   Smoking Status Never   Smokeless Tobacco Never           ROS:  General: Per HPI  Skin: Negative, except if noted below  HEENT: Negative  Respiratory: Negative  Cardiovascular: Negative  Gastrointestinal: Negative  Urinary: Negative  Vascular: Negative  Musculoskeletal: Positive per HPI   Neurologic: Positive per HPI  Endocrine: Negative    Objective:  BP Readings from Last 1 Encounters:   10/10/23 116/81      Wt Readings from Last 1 Encounters:   10/10/23 108 kg (237 lb 12.8 oz)        Respiratory:   non-labored respirations    Lymphatics:  no palpable lymph nodes    Gait:   antalgic    Neurologic:   Alert and oriented times 3  Patient with normal sensation except as noted below  Deep tendon reflexes 2+ except as noted in MSK exam    Bilateral Lower Extremity:  Left Knee: Inspection:  Skin intact    Overall limb alignment neutral    Effusion: none    ROM 0-115 w/ pain    Extensor Lag: none    Palpation: medial tibial tenderness to palpation    AP Stability at 90 deg stable    M/L stability in full extension stable    M/L stability in midflexion stable    Motor: 5/5 Q/HS/TA/GS/P    Pulses: 2+ DP / 2+ PT    SILT DP/SP/S/S/TN    Right knee: Inspection:  Skin intact     Overall limb alignment neutral    Effusion: none    ROM 0-115 w/ pain    Extensor Lag: none    Palpation: medial tibial tenderness to palpation    AP Stability at 90 deg stable    M/L stability in full extension stable    M/L stability in midflexion stable    Motor: 5/5 Q/HS/TA/GS/P    Pulses: 2+ DP / 2+ PT    SILT DP/SP/S/S/TN    Imaging:  XR of left knee 08/16/2023 No fracture or dislocation. XR of right knee 08/18/2023 No fracture or dislocation. MRI of right knee 08/26/2023 was reviewed and shows medial tibia stress fracture. No ligamentous or meniscal injury. MRI of left knee - report was provided by patient on his phone and stated that there is bony edema in the tibia w/o fracture line. BMI:   Estimated body mass index is 34.12 kg/m² as calculated from the following:    Height as of this encounter: 5' 10" (1.778 m). Weight as of this encounter: 108 kg (237 lb 12.8 oz). BSA:   Estimated body surface area is 2.25 meters squared as calculated from the following:    Height as of this encounter: 5' 10" (1.778 m). Weight as of this encounter: 108 kg (237 lb 12.8 oz).            Scribe Attestation    I,:   am acting as a scribe while in the presence of the attending physician.:       I,:   personally performed the services described in this documentation    as scribed in my presence.:

## 2023-10-10 NOTE — PROGRESS NOTES
Knee New Office Note    Assessment:     1. Pain in both knees, unspecified chronicity    2. Stress fracture of right tibia, initial encounter        Plan:     Problem List Items Addressed This Visit    None  Visit Diagnoses       Pain in both knees, unspecified chronicity    -  Primary    Relevant Orders    XR knee 3 vw left non injury    XR knee 3 vw right non injury    Stress fracture of right tibia, initial encounter        Relevant Orders    XR knee 3 vw right non injury            Findings today are consistent with right knee tibial stress fracture and left tibial bone edema on MRI. Imaging and prognosis was reviewed with the patient today. His pain is much improved from previous visit and has good healing of right tibial fracture on XR. He was encouraged to utilize a cane as needed. He should continue minimizing activity and protecting his knees as needed. He may start gradual return to activities as tolerated. Follow up as needed. Subjective:     Patient ID: Benny Maria is a 35 y.o. male. Chief Complaint:  HPI:  35 y.o. male presents to the office for follow up of right knee tibial stress fracture and left tibial bone edema on MRI. Since last visit he has been noticing gradual improvements. He continues to have left greater than right medial knee pain with weightbearing activities including ambulation. He has been using a cane to assist him at times. He denies any radiation, numbness, or tingling.     Allergy:  Allergies   Allergen Reactions    Gabapentin Fatigue    Trazodone Other (See Comments)     Medications:  all current active meds have been reviewed  Past Medical History:  Past Medical History:   Diagnosis Date    Chronic pain     GERD (gastroesophageal reflux disease)     Hypertension     Manic depressive disorder ([address])      Past Surgical History:  Past Surgical History:   Procedure Laterality Date    APPENDECTOMY LAPAROSCOPIC N/A 10/29/2021    Procedure: APPENDECTOMY LAPAROSCOPIC;  Surgeon: Mallory Pritchard MD;  Location: [facility] OR;  Service: General    NO PAST SURGERIES       Family History:  Family History   Problem Relation Age of Onset    Hypertension Mother     Diabetes Father      Social History:  Social History     Substance and Sexual Activity   Alcohol Use No     Social History     Substance and Sexual Activity   Drug Use No     Social History     Tobacco Use   Smoking Status Never   Smokeless Tobacco Never           ROS:  General: Per HPI  Skin: Negative, except if noted below  HEENT: Negative  Respiratory: Negative  Cardiovascular: Negative  Gastrointestinal: Negative  Urinary: Negative  Vascular: Negative  Musculoskeletal: Positive per HPI   Neurologic: Positive per HPI  Endocrine: Negative    Objective:  BP Readings from Last 1 Encounters:   10/10/23 116/81      Wt Readings from Last 1 Encounters:   10/10/23 108 kg (237 lb 12.8 oz)        Respiratory:   non-labored respirations    Lymphatics:  no palpable lymph nodes    Gait:   antalgic    Neurologic:   Alert and oriented times 3  Patient with normal sensation except as noted below  Deep tendon reflexes 2+ except as noted in MSK exam    Bilateral Lower Extremity:  Left Knee: Inspection:  Skin intact    Overall limb alignment neutral    Effusion: none    ROM full without pain    Extensor Lag: none    Palpation: medial Joint line tenderness to palpation    AP Stability at 90 deg stable    M/L stability in full extension stable    M/L stability in midflexion stable    Motor: 5/5 Q/HS/TA/GS/P    Pulses: 2+ DP / 2+ PT    SILT DP/SP/S/S/TN    Right knee: Inspection:  Skin intact    Overall limb alignment neutral    Effusion: none    ROM full without pain    Extensor Lag: none    Palpation: no medial Joint line tenderness to palpation    AP Stability at 90 deg stable    M/L stability in full extension stable    M/L stability in midflexion stable    Motor: 5/5 Q/HS/TA/GS/P    Pulses: 2+ DP / 2+ PT    SILT DP/SP/S/S/TN    Imaging:   My interpretation XR AP scanogram/AP bilateral knee/lateral/morejon/sunrise bilateral knees: healing medial tibial plateau fractures    BMI:   Estimated body mass index is 34.12 kg/m² as calculated from the following:    Height as of this encounter: 5' 10" (1.778 m). Weight as of this encounter: 108 kg (237 lb 12.8 oz). BSA:   Estimated body surface area is 2.25 meters squared as calculated from the following:    Height as of this encounter: 5' 10" (1.778 m). Weight as of this encounter: 108 kg (237 lb 12.8 oz).            Scribe Attestation      I,:  Kristen Barrios am acting as a scribe while in the presence of the attending physician.:       I,:  Randolph Guy, DO personally performed the services described in this documentation    as scribed in my presence.:

## 2023-12-04 ENCOUNTER — HOSPITAL ENCOUNTER (OUTPATIENT)
Dept: MRI IMAGING | Facility: HOSPITAL | Age: 33
Discharge: HOME | End: 2023-12-04
Payer: COMMERCIAL

## 2023-12-04 DIAGNOSIS — M53.3 SACRAL PAIN: ICD-10-CM

## 2023-12-04 DIAGNOSIS — M54.50 LUMBAR PAIN: ICD-10-CM

## 2023-12-04 PROCEDURE — 72195 MRI PELVIS W/O DYE: CPT

## 2023-12-04 PROCEDURE — G1004 CDSM NDSC: HCPCS

## 2023-12-04 PROCEDURE — 72148 MRI LUMBAR SPINE W/O DYE: CPT

## 2024-04-03 ENCOUNTER — TRANSCRIBE ORDERS (OUTPATIENT)
Dept: GASTROENTEROLOGY | Facility: CLINIC | Age: 34
End: 2024-04-03

## 2024-05-21 ENCOUNTER — ANESTHESIA (OUTPATIENT)
Dept: ANESTHESIOLOGY | Facility: HOSPITAL | Age: 34
End: 2024-05-21

## 2024-05-21 ENCOUNTER — ANESTHESIA EVENT (OUTPATIENT)
Dept: ANESTHESIOLOGY | Facility: HOSPITAL | Age: 34
End: 2024-05-21

## 2024-05-21 ENCOUNTER — OFFICE VISIT (OUTPATIENT)
Dept: GASTROENTEROLOGY | Facility: MEDICAL CENTER | Age: 34
End: 2024-05-21
Payer: COMMERCIAL

## 2024-05-21 ENCOUNTER — PREP FOR PROCEDURE (OUTPATIENT)
Dept: GASTROENTEROLOGY | Facility: MEDICAL CENTER | Age: 34
End: 2024-05-21

## 2024-05-21 ENCOUNTER — TELEPHONE (OUTPATIENT)
Dept: GASTROENTEROLOGY | Facility: MEDICAL CENTER | Age: 34
End: 2024-05-21

## 2024-05-21 VITALS
HEART RATE: 76 BPM | TEMPERATURE: 96.8 F | DIASTOLIC BLOOD PRESSURE: 87 MMHG | WEIGHT: 241.6 LBS | BODY MASS INDEX: 34.67 KG/M2 | SYSTOLIC BLOOD PRESSURE: 132 MMHG

## 2024-05-21 DIAGNOSIS — R13.10 DYSPHAGIA, UNSPECIFIED TYPE: Primary | ICD-10-CM

## 2024-05-21 DIAGNOSIS — R11.0 NAUSEA: ICD-10-CM

## 2024-05-21 DIAGNOSIS — K21.9 GASTROESOPHAGEAL REFLUX DISEASE WITHOUT ESOPHAGITIS: ICD-10-CM

## 2024-05-21 PROCEDURE — 99204 OFFICE O/P NEW MOD 45 MIN: CPT | Performed by: NURSE PRACTITIONER

## 2024-05-21 NOTE — H&P (VIEW-ONLY)
[facility] - Outpatient Consultation  Manish Johnston 33 y.o. male MRN: [id number]  Encounter: [id number]          ASSESSMENT AND PLAN:    Manish Johnston is a 33 y.o. male who presents with complaint of dysphagia, nausea and GERD.    1.  Dysphagia  2.  Nausea  3.  GERD    Started approximately 2 years ago with intermittent bouts of oropharyngeal and upper esophageal dysphagia with solids and liquids.  No coughing or choking.  Occasionally brings food back up but does also occasionally advance with liquids.  Cannot pinpoint specific food triggers.  Does have chronic heartburn/reflux for several years.  Reports last EGD was 2 to 3 years ago and was normal per patient.  He does get intermittent midsternal chest pain when eating at times.  Cardiac workup in the past has been negative.  No weight loss.  Also has intermittent nausea on an empty stomach.  It is better as the day progresses.  No vomiting.  No skin rash or frequent eye infections.  Does drink 1 cup of caffeine per day.  Is taking meloxicam on a daily basis.  Rare spicy foods.  Rare alcohol use.  He has been on omeprazole 20 mg daily for years.  Recently increased the dose to 40 mg daily for the past 4 to 5 months with little help.  Will rule out H. pylori, celiac disease, esophageal stricture, esophageal dysmotility, PUD, gastritis/esophagitis.    -Stool for H. pylori (off PPI for 2 weeks),celiac panel, CBC, CMP and lipase  -Continue omeprazole 40 mg daily  -Antireflux diet  -Barium swallow to assess for esophageal motility and strictures  -EGD  -Follow-up in office after testing    4.  Change in bowel habits    BMs are brown and formed alternating with soft stools for many years.  Denies any melena or hematochezia.  Cannot pinpoint specific food triggers.  Reports he does not have a high-fiber diet nor does he drink many liquids during the day.  No family history of any colon cancers or polyps.  Never had a colonoscopy.   Will recommend fiber supplement daily and increase water intake.  If symptoms persist will consider further testing.    -Start fiber supplement daily  -Increase water intake to 64 ounces daily      I obtained informed consent from the patient. The risks/benefits/alternatives of the procedure were discussed with the patient. Risks included, but not limited to, infection, bleeding, perforation, injury to organs in the abdomen, missed lesion and incomplete procedure were discussed. Patient was agreeable and electronic signature was obtained.       ______________________________________________________________________    HPI:    Manish Johnston is a 33 y.o. male who presents with complaint of dysphagia, nausea and GERD..  He has a past medical history of hypertension, PAULA, GERD, manic depressive disorder.    Started approximately 2 years ago with intermittent bouts of oropharyngeal and upper esophageal dysphagia with solids and liquids.  No coughing or choking.  Occasionally brings food back up but does also occasionally advance with liquids.  Cannot pinpoint specific food triggers.  Does have chronic heartburn/reflux for several years.  Reports last EGD was 2 to 3 years ago and was normal per patient.  He does get intermittent midsternal chest pain when eating at times.  Cardiac workup in the past has been negative.  No weight loss.  Also has intermittent nausea on an empty stomach.  It is better as the day progresses.  No vomiting.  No skin rash or frequent eye infections.  Does drink 1 cup of caffeine per day.  Is taking meloxicam on a daily basis.  Rare spicy foods.  Rare alcohol use.    BMs are brown and formed alternating with soft stools for many years.  Denies any melena or hematochezia.  Cannot pinpoint specific food triggers.  Reports he does not have a high-fiber diet nor does he drink many liquids during the day.  No family history of any colon cancers or polyps.  Never had a colonoscopy.    MRI pelvis  without contrast 5/23 no significant abnormalities.  Labs 11/23-CMP normal      REVIEW OF SYSTEMS:    CONSTITUTIONAL: Denies any fever, chills, rigors, and weight loss.  HEENT: No earache or tinnitus. Denies hearing loss or visual disturbances.  CARDIOVASCULAR: No chest pain or palpitations.   RESPIRATORY: Denies any cough, hemoptysis, shortness of breath or dyspnea on exertion.  GASTROINTESTINAL: As noted in the History of Present Illness.   GENITOURINARY: No problems with urination. Denies any hematuria or dysuria.  NEUROLOGIC: No dizziness or vertigo, denies headaches.   MUSCULOSKELETAL: Denies any muscle or joint pain.   SKIN: Denies skin rashes or itching.   ENDOCRINE: Denies excessive thirst. Denies intolerance to heat or cold.  PSYCHOSOCIAL: Denies depression or anxiety. Denies any recent memory loss.       Historical Information   Past Medical History:   Diagnosis Date   • Chronic pain    • GERD (gastroesophageal reflux disease)    • Hypertension    • Manic depressive disorder (HCC)      Past Surgical History:   Procedure Laterality Date   • APPENDECTOMY LAPAROSCOPIC N/A 10/29/2021    Procedure: APPENDECTOMY LAPAROSCOPIC;  Surgeon: Glenis Molina MD;  Location: [facility];  Service: General   • NO PAST SURGERIES       Social History   Social History     Substance and Sexual Activity   Alcohol Use No     Social History     Substance and Sexual Activity   Drug Use No     Social History     Tobacco Use   Smoking Status Never   Smokeless Tobacco Never     Family History   Problem Relation Age of Onset   • Hypertension Mother    • Diabetes Father        Meds/Allergies       Current Outpatient Medications:   •  Diclofenac Sodium (VOLTAREN) 1 %  •  divalproex sodium (DEPAKOTE ER) 500 mg 24 hr tablet  •  lidocaine (LIDODERM) 5 %  •  omeprazole (PriLOSEC) 20 mg delayed release capsule  •  acetaminophen (TYLENOL) 500 mg tablet  •  celecoxib (CeleBREX) 100 mg capsule  •  cyclobenzaprine (FLEXERIL) 10 mg tablet  •   hydrOXYzine HCL (ATARAX) 25 mg tablet  •  naproxen (NAPROSYN) 250 mg tablet  •  naproxen (Naprosyn) 500 mg tablet    Allergies   Allergen Reactions   • Gabapentin Fatigue   • Trazodone Other (See Comments)           Objective     Blood pressure 132/87, pulse 76, temperature (!) 96.8 °F (36 °C), weight 110 kg (241 lb 9.6 oz). Body mass index is 34.67 kg/m².        PHYSICAL EXAM:      General Appearance:   Alert, cooperative, no distress   HEENT:   Normocephalic, atraumatic, anicteric.     Neck:  Supple, symmetrical, trachea midline   Lungs:   Clear to auscultation bilaterally; no rales, rhonchi or wheezing; respirations unlabored    Heart::   Regular rate and rhythm; no murmur, rub, or gallop.   Abdomen:   Soft, non-tender, non-distended; normal bowel sounds; no masses, no organomegaly    Genitalia:   Deferred    Rectal:   Deferred    Extremities:  No cyanosis, clubbing or edema    Pulses:  2+ and symmetric    Skin:  No jaundice, rashes, or lesions    Lymph nodes:  No palpable cervical lymphadenopathy        Lab Results:   No visits with results within 1 Day(s) from this visit.   Latest known visit with results is:   Appointment on 08/29/2023   Component Date Value   • WBC 08/29/2023 10.30 (H)    • RBC 08/29/2023 5.22    • Hemoglobin 08/29/2023 15.9    • Hematocrit 08/29/2023 47.6    • MCV 08/29/2023 91    • MCH 08/29/2023 30.5    • MCHC 08/29/2023 33.4    • RDW 08/29/2023 12.1    • MPV 08/29/2023 11.3    • Platelets 08/29/2023 373    • nRBC 08/29/2023 0    • Segmented % 08/29/2023 68    • Immature Grans % 08/29/2023 0    • Lymphocytes % 08/29/2023 25    • Monocytes % 08/29/2023 7    • Eosinophils Relative 08/29/2023 0    • Basophils Relative 08/29/2023 0    • Absolute Neutrophils 08/29/2023 6.90    • Absolute Immature Grans 08/29/2023 0.02    • Absolute Lymphocytes 08/29/2023 2.60    • Absolute Monocytes 08/29/2023 0.71    • Eosinophils Absolute 08/29/2023 0.03    • Basophils Absolute 08/29/2023 0.04    • Sodium  "08/29/2023 138    • Potassium 08/29/2023 3.9    • Chloride 08/29/2023 102    • CO2 08/29/2023 28    • ANION GAP 08/29/2023 8    • BUN 08/29/2023 16    • Creatinine 08/29/2023 0.99    • Glucose 08/29/2023 90    • Calcium 08/29/2023 10.2    • AST 08/29/2023 19    • ALT 08/29/2023 18    • Alkaline Phosphatase 08/29/2023 117 (H)    • Total Protein 08/29/2023 8.4    • Albumin 08/29/2023 4.8    • Total Bilirubin 08/29/2023 0.41    • eGFR 08/29/2023 99    • 25-HYDROXY VIT D 08/29/2023 31    • 25-Hydroxy D2 08/29/2023 <1.0    • 25-HYDROXY VIT D3 08/29/2023 31        Lab Results   Component Value Date    WBC 10.30 (H) 08/29/2023    HGB 15.9 08/29/2023    HCT 47.6 08/29/2023    MCV 91 08/29/2023     08/29/2023       Lab Results   Component Value Date    SODIUM 137 11/24/2023    K 3.9 11/24/2023     11/24/2023    CO2 26 11/24/2023    AGAP 6 11/24/2023    BUN 14 11/24/2023    CREATININE 1.01 11/24/2023    GLUC 113 (H) 11/24/2023    GLUF 85 11/06/2018    CALCIUM 9.7 11/24/2023    AST 17 11/24/2023    ALT 19 11/24/2023    ALKPHOS 75 11/24/2023    TP 7.7 11/24/2023    TBILI 0.4 11/24/2023    EGFR 101 11/24/2023       Lab Results   Component Value Date    CRP 3.8 (H) 01/16/2019       Lab Results   Component Value Date    TQI9VJCIGXCT 0.907 04/05/2021    TSH 1.11 06/03/2022       No results found for: \"IRON\", \"TIBC\", \"FERRITIN\"    Radiology Results:   No results found.  "

## 2024-05-21 NOTE — TELEPHONE ENCOUNTER
Procedure: EGD  Date: 05/28/2024  Physician performing: Dr. Fried  Location of procedure:  [facility]  Instructions given to patient: Yes  Diabetic: No  Clearances: N/A

## 2024-05-21 NOTE — PROGRESS NOTES
[facility] - Outpatient Consultation  Manish Johnston 33 y.o. male MRN: [id number]  Encounter: [id number]          ASSESSMENT AND PLAN:    Manish Johnston is a 33 y.o. male who presents with complaint of dysphagia, nausea and GERD.    1.  Dysphagia  2.  Nausea  3.  GERD    Started approximately 2 years ago with intermittent bouts of oropharyngeal and upper esophageal dysphagia with solids and liquids.  No coughing or choking.  Occasionally brings food back up but does also occasionally advance with liquids.  Cannot pinpoint specific food triggers.  Does have chronic heartburn/reflux for several years.  Reports last EGD was 2 to 3 years ago and was normal per patient.  He does get intermittent midsternal chest pain when eating at times.  Cardiac workup in the past has been negative.  No weight loss.  Also has intermittent nausea on an empty stomach.  It is better as the day progresses.  No vomiting.  No skin rash or frequent eye infections.  Does drink 1 cup of caffeine per day.  Is taking meloxicam on a daily basis.  Rare spicy foods.  Rare alcohol use.  He has been on omeprazole 20 mg daily for years.  Recently increased the dose to 40 mg daily for the past 4 to 5 months with little help.  Will rule out H. pylori, celiac disease, esophageal stricture, esophageal dysmotility, PUD, gastritis/esophagitis.    -Stool for H. pylori (off PPI for 2 weeks),celiac panel, CBC, CMP and lipase  -Continue omeprazole 40 mg daily  -Antireflux diet  -Barium swallow to assess for esophageal motility and strictures  -EGD  -Follow-up in office after testing    4.  Change in bowel habits    BMs are brown and formed alternating with soft stools for many years.  Denies any melena or hematochezia.  Cannot pinpoint specific food triggers.  Reports he does not have a high-fiber diet nor does he drink many liquids during the day.  No family history of any colon cancers or polyps.  Never had a colonoscopy.   Will recommend fiber supplement daily and increase water intake.  If symptoms persist will consider further testing.    -Start fiber supplement daily  -Increase water intake to 64 ounces daily      I obtained informed consent from the patient. The risks/benefits/alternatives of the procedure were discussed with the patient. Risks included, but not limited to, infection, bleeding, perforation, injury to organs in the abdomen, missed lesion and incomplete procedure were discussed. Patient was agreeable and electronic signature was obtained.       ______________________________________________________________________    HPI:    Manish Johnston is a 33 y.o. male who presents with complaint of dysphagia, nausea and GERD..  He has a past medical history of hypertension, PAULA, GERD, manic depressive disorder.    Started approximately 2 years ago with intermittent bouts of oropharyngeal and upper esophageal dysphagia with solids and liquids.  No coughing or choking.  Occasionally brings food back up but does also occasionally advance with liquids.  Cannot pinpoint specific food triggers.  Does have chronic heartburn/reflux for several years.  Reports last EGD was 2 to 3 years ago and was normal per patient.  He does get intermittent midsternal chest pain when eating at times.  Cardiac workup in the past has been negative.  No weight loss.  Also has intermittent nausea on an empty stomach.  It is better as the day progresses.  No vomiting.  No skin rash or frequent eye infections.  Does drink 1 cup of caffeine per day.  Is taking meloxicam on a daily basis.  Rare spicy foods.  Rare alcohol use.    BMs are brown and formed alternating with soft stools for many years.  Denies any melena or hematochezia.  Cannot pinpoint specific food triggers.  Reports he does not have a high-fiber diet nor does he drink many liquids during the day.  No family history of any colon cancers or polyps.  Never had a colonoscopy.    MRI pelvis  without contrast 5/23 no significant abnormalities.  Labs 11/23-CMP normal      REVIEW OF SYSTEMS:    CONSTITUTIONAL: Denies any fever, chills, rigors, and weight loss.  HEENT: No earache or tinnitus. Denies hearing loss or visual disturbances.  CARDIOVASCULAR: No chest pain or palpitations.   RESPIRATORY: Denies any cough, hemoptysis, shortness of breath or dyspnea on exertion.  GASTROINTESTINAL: As noted in the History of Present Illness.   GENITOURINARY: No problems with urination. Denies any hematuria or dysuria.  NEUROLOGIC: No dizziness or vertigo, denies headaches.   MUSCULOSKELETAL: Denies any muscle or joint pain.   SKIN: Denies skin rashes or itching.   ENDOCRINE: Denies excessive thirst. Denies intolerance to heat or cold.  PSYCHOSOCIAL: Denies depression or anxiety. Denies any recent memory loss.       Historical Information   Past Medical History:   Diagnosis Date   • Chronic pain    • GERD (gastroesophageal reflux disease)    • Hypertension    • Manic depressive disorder (HCC)      Past Surgical History:   Procedure Laterality Date   • APPENDECTOMY LAPAROSCOPIC N/A 10/29/2021    Procedure: APPENDECTOMY LAPAROSCOPIC;  Surgeon: Glenis Molina MD;  Location: [facility];  Service: General   • NO PAST SURGERIES       Social History   Social History     Substance and Sexual Activity   Alcohol Use No     Social History     Substance and Sexual Activity   Drug Use No     Social History     Tobacco Use   Smoking Status Never   Smokeless Tobacco Never     Family History   Problem Relation Age of Onset   • Hypertension Mother    • Diabetes Father        Meds/Allergies       Current Outpatient Medications:   •  Diclofenac Sodium (VOLTAREN) 1 %  •  divalproex sodium (DEPAKOTE ER) 500 mg 24 hr tablet  •  lidocaine (LIDODERM) 5 %  •  omeprazole (PriLOSEC) 20 mg delayed release capsule  •  acetaminophen (TYLENOL) 500 mg tablet  •  celecoxib (CeleBREX) 100 mg capsule  •  cyclobenzaprine (FLEXERIL) 10 mg tablet  •   hydrOXYzine HCL (ATARAX) 25 mg tablet  •  naproxen (NAPROSYN) 250 mg tablet  •  naproxen (Naprosyn) 500 mg tablet    Allergies   Allergen Reactions   • Gabapentin Fatigue   • Trazodone Other (See Comments)           Objective     Blood pressure 132/87, pulse 76, temperature (!) 96.8 °F (36 °C), weight 110 kg (241 lb 9.6 oz). Body mass index is 34.67 kg/m².        PHYSICAL EXAM:      General Appearance:   Alert, cooperative, no distress   HEENT:   Normocephalic, atraumatic, anicteric.     Neck:  Supple, symmetrical, trachea midline   Lungs:   Clear to auscultation bilaterally; no rales, rhonchi or wheezing; respirations unlabored    Heart::   Regular rate and rhythm; no murmur, rub, or gallop.   Abdomen:   Soft, non-tender, non-distended; normal bowel sounds; no masses, no organomegaly    Genitalia:   Deferred    Rectal:   Deferred    Extremities:  No cyanosis, clubbing or edema    Pulses:  2+ and symmetric    Skin:  No jaundice, rashes, or lesions    Lymph nodes:  No palpable cervical lymphadenopathy        Lab Results:   No visits with results within 1 Day(s) from this visit.   Latest known visit with results is:   Appointment on 08/29/2023   Component Date Value   • WBC 08/29/2023 10.30 (H)    • RBC 08/29/2023 5.22    • Hemoglobin 08/29/2023 15.9    • Hematocrit 08/29/2023 47.6    • MCV 08/29/2023 91    • MCH 08/29/2023 30.5    • MCHC 08/29/2023 33.4    • RDW 08/29/2023 12.1    • MPV 08/29/2023 11.3    • Platelets 08/29/2023 373    • nRBC 08/29/2023 0    • Segmented % 08/29/2023 68    • Immature Grans % 08/29/2023 0    • Lymphocytes % 08/29/2023 25    • Monocytes % 08/29/2023 7    • Eosinophils Relative 08/29/2023 0    • Basophils Relative 08/29/2023 0    • Absolute Neutrophils 08/29/2023 6.90    • Absolute Immature Grans 08/29/2023 0.02    • Absolute Lymphocytes 08/29/2023 2.60    • Absolute Monocytes 08/29/2023 0.71    • Eosinophils Absolute 08/29/2023 0.03    • Basophils Absolute 08/29/2023 0.04    • Sodium  "08/29/2023 138    • Potassium 08/29/2023 3.9    • Chloride 08/29/2023 102    • CO2 08/29/2023 28    • ANION GAP 08/29/2023 8    • BUN 08/29/2023 16    • Creatinine 08/29/2023 0.99    • Glucose 08/29/2023 90    • Calcium 08/29/2023 10.2    • AST 08/29/2023 19    • ALT 08/29/2023 18    • Alkaline Phosphatase 08/29/2023 117 (H)    • Total Protein 08/29/2023 8.4    • Albumin 08/29/2023 4.8    • Total Bilirubin 08/29/2023 0.41    • eGFR 08/29/2023 99    • 25-HYDROXY VIT D 08/29/2023 31    • 25-Hydroxy D2 08/29/2023 <1.0    • 25-HYDROXY VIT D3 08/29/2023 31        Lab Results   Component Value Date    WBC 10.30 (H) 08/29/2023    HGB 15.9 08/29/2023    HCT 47.6 08/29/2023    MCV 91 08/29/2023     08/29/2023       Lab Results   Component Value Date    SODIUM 137 11/24/2023    K 3.9 11/24/2023     11/24/2023    CO2 26 11/24/2023    AGAP 6 11/24/2023    BUN 14 11/24/2023    CREATININE 1.01 11/24/2023    GLUC 113 (H) 11/24/2023    GLUF 85 11/06/2018    CALCIUM 9.7 11/24/2023    AST 17 11/24/2023    ALT 19 11/24/2023    ALKPHOS 75 11/24/2023    TP 7.7 11/24/2023    TBILI 0.4 11/24/2023    EGFR 101 11/24/2023       Lab Results   Component Value Date    CRP 3.8 (H) 01/16/2019       Lab Results   Component Value Date    [id number] 0.907 04/05/2021    TSH 1.11 06/03/2022       No results found for: \"IRON\", \"TIBC\", \"FERRITIN\"    Radiology Results:   No results found.  "

## 2024-05-21 NOTE — PATIENT INSTRUCTIONS
GERD (Gastroesophageal Reflux Disease)   AMBULATORY CARE:   Gastroesophageal reflux disease (GERD)  is reflux that happens more than 2 times a week for a few weeks. Reflux means acid and food in your stomach back up into your esophagus. GERD can cause other health problems over time if it is not treated.       Common causes of GERD:  GERD often happens because the lower muscle (sphincter) of the esophagus does not close properly. The sphincter normally opens to let food into the stomach. It then closes to keep food and stomach acid in the stomach. If the sphincter does not close properly, stomach acid and food back up (reflux) into the esophagus. The following may increase your risk for GERD:  Certain foods such as spicy foods, chocolate, foods that contain caffeine, peppermint, and fried foods    Hiatal hernia    Certain medicines such as calcium channel blockers (used to treat high blood pressure), allergy medicines, sedatives, or antidepressants    Pregnancy, obesity, or scleroderma    Lying down after a meal    Drinking alcohol or smoking cigarettes    Signs and symptoms:   Heartburn (burning pain in your chest)    Pain after meals that spreads to your neck, jaw, or shoulder    Pain that gets better when you change positions    Bitter or acid taste in your mouth    A dry cough    Trouble swallowing or pain with swallowing    Hoarseness or a sore throat    Burping or hiccups    Feeling full soon after you start eating    Call your local emergency number (911 in the [place]) if:   You have severe chest pain and sudden trouble breathing.      Seek care immediately if:   You have trouble breathing after you vomit.    You have trouble swallowing, or pain with swallowing.    Your bowel movements are black, bloody, or tarry-looking.    Your vomit looks like coffee grounds or has blood in it.    Call your doctor or gastroenterologist if:   You feel full and cannot burp or vomit.    You vomit large amounts, or you vomit  often.    You are losing weight without trying.    Your symptoms get worse or do not improve with treatment.    You have questions or concerns about your condition or care.    Treatment for GERD:   Medicines  are used to decrease stomach acid. Medicine may also be used to help your lower esophageal sphincter and stomach contract (tighten) more.    Surgery  is done to wrap the upper part of the stomach around the esophageal sphincter. This will strengthen the sphincter and prevent reflux.    Manage GERD:       Do not have foods or drinks that may increase heartburn.  These include chocolate, peppermint, fried or fatty foods, drinks that contain caffeine, or carbonated drinks (soda). Other foods include spicy foods, onions, tomatoes, and tomato-based foods. Do not have foods or drinks that can irritate your esophagus, such as citrus fruits, juices, and alcohol.    Do not eat large meals.  When you eat a lot of food at one time, your stomach needs more acid to digest it. Eat 6 small meals each day instead of 3 large meals, and eat slowly. Do not eat meals 2 to 3 hours before bedtime.    Elevate the head of your bed.  Place 6-inch blocks under the head of your bed frame. You may also use more than one pillow under your head and shoulders while you sleep.    Maintain a healthy weight.  If you are overweight, weight loss may help relieve symptoms of GERD.    Do not smoke.  Smoking weakens the lower esophageal sphincter and increases the risk of GERD. Ask your healthcare provider for information if you currently smoke and need help to quit. E-cigarettes or smokeless tobacco still contain nicotine. Talk to your healthcare provider before you use these products.    Do not put pressure on your abdomen.  Pressure pushes acid up into your esophagus. Do not wear clothing that is tight around your waist. Do not bend over. Bend at the knees if you need to pick something up.  Follow up with your doctor or gastroenterologist as  directed:  Write down your questions so you remember to ask them during your visits.  © Copyright [employer] 2023 Information is for End User's use only and may not be sold, redistributed or otherwise used for commercial purposes.  The above information is an  only. It is not intended as medical advice for individual conditions or treatments. Talk to your doctor, nurse or pharmacist before following any medical regimen to see if it is safe and effective for you.  Moderate Sedation   AMBULATORY CARE:   What you need to know about moderate sedation:  Moderate sedation, or conscious sedation, is medicine used during procedures to help you feel relaxed and calm. You will be awake and able to follow directions without anxiety or pain. You will remember little to none of the procedure. Moderate sedation can be used for procedures such as a colonoscopy, wound repair, cataract removal, or dental work. The medicine is given as a pill, shot, inhaled solution, or injection through an IV.  How to prepare for moderate sedation:  Your healthcare provider will talk to you about how to prepare for moderate sedation. You may be told not to eat or drink anything for 8 hours before moderate sedation. You may be able to drink clear liquids up until 2 hours before moderate sedation. Tell healthcare providers if you have any allergies, heart problems, or breathing problems. Arrange for someone to drive you home and stay with you for 24 hours. You may feel sleepy and need help doing things at home. Another person may need to call 911 if you cannot be woken.  What will happen during moderate sedation:  Your healthcare provider will give you enough medicine to keep you relaxed and calm. Your healthcare provider will monitor your blood pressure, heart rate, and breathing. You will be on a heart monitor and a pulse oximeter. A heart monitor is a safety device that stays on continuously to record your heart's electrical activity.  A pulse oximeter is a device that measures the amount of oxygen in your blood. You may get oxygen through a mask placed over your nose and mouth or through small tubes placed in your nostrils.  What will happen after moderate sedation:  Healthcare providers will monitor you until you are awake. You may need extra oxygen if your blood oxygen level is lower than it should be. Ask your healthcare provider before you take off the mask or oxygen tubing. You may be able to go home when you are alert and can stand up. This may take 1 to 2 hours after you have received moderate sedation. You may feel tired, weak, or unsteady on your feet after you get sedation. You may also have trouble concentrating or short-term memory loss. These symptoms should go away in 24 hours or less.  Risks of moderate sedation:   You may get a headache or nausea from the medicine. You may have problems with your short-term memory. Your skin may itch or your eyes may water. You may not get enough sedation, or it may wear off quickly. You may feel restless during the procedure or as you wake up.    Too much medicine can cause deep sedation. Your healthcare provider may have trouble waking you, and you may need medicine to help you wake up. Your breathing may not be regular, or it may stop. You may need a ventilator to help you breathe. Your risk for problems with sedation is higher if you have heart or lung disease, a head injury, or drink alcohol.    Call 911 or have someone else call for any of the following:   You have sudden trouble breathing.    You cannot be woken.    Seek care immediately if:   You have a severe headache or dizziness.    Your heart is beating faster than usual.    Contact your healthcare provider if:   You have a fever.    You have nausea or are vomiting for more than 8 hours after the procedure.    Your skin is itchy, swollen, or you have a rash.    You have questions or concerns about your condition or care.    Self-care:    Have someone stay with you for 24 hours.  This person can drive you to errands and help you do things around the house. This person can also watch for problems.    Rest and do quiet activities for 24 hours.  Do not exercise, ride a bike, or play sports. Stand up slowly to prevent dizziness and falls. Take short walks around the house with another person. Slowly return to your usual activities the next day.    Do not drive or use dangerous machines or tools for 24 hours.  You may injure yourself or others. Examples include a lawnmower, saw, or drill. Do not return to work for 24 hours if you use dangerous machines or tools for work.    Do not make important decisions for 24 hours.  For example, do not sign important papers or invest money.    Drink liquids as directed.  Liquids help flush the sedation medicine out of your body. Ask how much liquid to drink each day and which liquids are best for you.    Eat small, frequent meals to prevent nausea and vomiting.  Start with clear liquids such as juice or broth. If you do not vomit after clear liquids, you can eat your usual foods.    Do not drink alcohol or take medicines that make you drowsy.  This includes medicines that help you sleep and anxiety medicines. Ask your healthcare provider if it is safe for you to take pain medicine.    Follow up with your healthcare provider as directed:  Write down your questions so you remember to ask them during your visits.  © Copyright [employer] 2023 Information is for End User's use only and may not be sold, redistributed or otherwise used for commercial purposes.  The above information is an  only. It is not intended as medical advice for individual conditions or treatments. Talk to your doctor, nurse or pharmacist before following any medical regimen to see if it is safe and effective for you.  Upper Endoscopy   AMBULATORY CARE:   What you need to know about an upper endoscopy:  An upper endoscopy is also called  an upper gastrointestinal (GI) endoscopy, or an esophagogastroduodenoscopy (EGD). A scope (thin, flexible tube with a light and camera) is used to examine the walls of your upper digestive tract. The upper digestive tract includes the esophagus, stomach, and duodenum (first part of the small intestine). An upper endoscopy is used to look for problems, such as bleeding, polyps, ulcers, or infection.        How to prepare for an upper endoscopy:  Your healthcare provider will talk to you about how to prepare for your procedure. You may be told not eat or drink anything except water for 6 to 12 hours before the procedure. Your provider will tell you which medicines to take or not take on the day of your procedure. Arrange to have someone drive you home.  What will happen during an upper endoscopy:   You will be given medicine through your IV to help you relax and make you drowsy. You will also be given medicine to numb your throat. You may need to wear a plastic mouthpiece to help hold your mouth open and protect your teeth and tongue. Your provider will gently insert the endoscope through your mouth and down into your throat. You may be asked to swallow to help move the scope. You may feel pressure in your throat, but you should not feel pain. The endoscope does not restrict your breathing.    Your provider will watch the scope on a monitor and take pictures with the scope. Your provider may gently inject air to make it easier to see your digestive tract clearly. Your provider may take tissue samples and send them to a lab for tests. Foreign objects, tumors, or polyps that may be blocking your digestive tract may be removed. Your provider may also insert tools with the scope to treat bleeding or place a stent (tube).    What to expect after an upper endoscopy:  You may feel bloated, gassy, or have some abdominal discomfort. Your throat may be sore for 24 to 36 hours after the procedure. You may burp or pass gas from  air that is still inside your body after your procedure. You may need to take short walks to help move the gas out. Eat small meals, if you feel bloated. Do not drive or make important decisions until the day after your procedure.  Risks of an upper endoscopy:  Your esophagus, stomach, or duodenum may be punctured or torn during the procedure. This is because of increased pressure as the scope and air are passing through. You may bleed more than expected or get an infection. You may have a slow or irregular heartbeat, or low blood pressure. This can cause sweating and fainting. Fluid may enter your lungs and you may have trouble breathing. These problems can be life-threatening.  Call 911 if:   You have sudden chest pain or trouble breathing.      Seek care immediately if:   You feel dizzy or faint.    You have trouble swallowing.    You have severe throat pain.    Your bowel movements are very dark or black.    Your abdomen is hard and firm and you have severe pain.    You vomit blood.    Contact your healthcare provider if:   You feel full or bloated and cannot burp or pass gas.    You have not had a bowel movement for 3 days after your procedure.    You have neck pain.    You have a fever or chills.    You have nausea or are vomiting.    You have a rash or hives.    You have questions or concerns about your endoscopy.    Relieve a sore throat:  Suck on throat lozenges or crushed ice. Gargle with a small amount of warm salt water. Mix 1 teaspoon of salt and 1 cup of warm water to make salt water.  Relieve gas and discomfort from bloating:  Lie on your right side with a heating pad on your abdomen. Take short walks to help pass gas. Eat small meals until bloating is relieved.  Rest after your procedure:  You have been given medicine to relax you. Do not  drive or make important decisions until the day after your procedure. Return to your normal activity as directed. You can usually return to work the day after your  procedure.  Follow up with your healthcare provider as directed:  Write down your questions so you remember to ask them during your visits.  © Copyright [employer] 2023 Information is for End User's use only and may not be sold, redistributed or otherwise used for commercial purposes.  The above information is an  only. It is not intended as medical advice for individual conditions or treatments. Talk to your doctor, nurse or pharmacist before following any medical regimen to see if it is safe and effective for you.    Stop Omeprazole for 2 weeks before getting stool for H pylori. Can use Pepcid 20 mg 2 times per day in place of Omeprazole before H pylori stool test

## 2024-05-24 RX ORDER — SODIUM CHLORIDE 9 MG/ML
125 INJECTION, SOLUTION INTRAVENOUS CONTINUOUS
Status: CANCELLED | OUTPATIENT
Start: 2024-05-24

## 2024-05-26 ENCOUNTER — APPOINTMENT (EMERGENCY)
Dept: RADIOLOGY | Facility: HOSPITAL | Age: 34
End: 2024-05-26
Payer: COMMERCIAL

## 2024-05-26 ENCOUNTER — HOSPITAL ENCOUNTER (EMERGENCY)
Facility: HOSPITAL | Age: 34
Discharge: HOME | End: 2024-05-27
Attending: EMERGENCY MEDICINE
Payer: COMMERCIAL

## 2024-05-26 DIAGNOSIS — R07.89 ATYPICAL CHEST PAIN: Primary | ICD-10-CM

## 2024-05-26 DIAGNOSIS — K21.9 GERD (GASTROESOPHAGEAL REFLUX DISEASE): ICD-10-CM

## 2024-05-26 LAB
ALBUMIN SERPL BCP-MCNC: 4.8 G/DL (ref 3.5–5)
ALP SERPL-CCNC: 88 U/L (ref 34–104)
ALT SERPL W P-5'-P-CCNC: 17 U/L (ref 7–52)
ANION GAP SERPL CALCULATED.3IONS-SCNC: 11 MMOL/L (ref 4–13)
AST SERPL W P-5'-P-CCNC: 18 U/L (ref 13–39)
ATRIAL RATE: 65 BPM
BASOPHILS # BLD AUTO: 0.06 THOUSANDS/ÂΜL (ref 0–0.1)
BASOPHILS NFR BLD AUTO: 1 % (ref 0–1)
BILIRUB SERPL-MCNC: 0.64 MG/DL (ref 0.2–1)
BNP SERPL-MCNC: 17 PG/ML (ref 0–100)
BUN SERPL-MCNC: 12 MG/DL (ref 5–25)
CALCIUM SERPL-MCNC: 9.9 MG/DL (ref 8.4–10.2)
CARDIAC TROPONIN I PNL SERPL HS: <2 NG/L
CHLORIDE SERPL-SCNC: 103 MMOL/L (ref 96–108)
CO2 SERPL-SCNC: 26 MMOL/L (ref 21–32)
CREAT SERPL-MCNC: 1.07 MG/DL (ref 0.6–1.3)
EOSINOPHIL # BLD AUTO: 0.04 THOUSAND/ÂΜL (ref 0–0.61)
EOSINOPHIL NFR BLD AUTO: 0 % (ref 0–6)
ERYTHROCYTE [DISTWIDTH] IN BLOOD BY AUTOMATED COUNT: 12.2 % (ref 11.6–15.1)
GFR SERPL CREATININE-BSD FRML MDRD: 90 ML/MIN/1.73SQ M
GLUCOSE SERPL-MCNC: 104 MG/DL (ref 65–140)
HCT VFR BLD AUTO: 44.2 % (ref 36.5–49.3)
HGB BLD-MCNC: 14.9 G/DL (ref 12–17)
IMM GRANULOCYTES # BLD AUTO: 0.02 THOUSAND/UL (ref 0–0.2)
IMM GRANULOCYTES NFR BLD AUTO: 0 % (ref 0–2)
LYMPHOCYTES # BLD AUTO: 4.27 THOUSANDS/ÂΜL (ref 0.6–4.47)
LYMPHOCYTES NFR BLD AUTO: 40 % (ref 14–44)
MAGNESIUM SERPL-MCNC: 2.1 MG/DL (ref 1.9–2.7)
MCH RBC QN AUTO: 29.4 PG (ref 26.8–34.3)
MCHC RBC AUTO-ENTMCNC: 33.7 G/DL (ref 31.4–37.4)
MCV RBC AUTO: 87 FL (ref 82–98)
MONOCYTES # BLD AUTO: 0.76 THOUSAND/ÂΜL (ref 0.17–1.22)
MONOCYTES NFR BLD AUTO: 7 % (ref 4–12)
NEUTROPHILS # BLD AUTO: 5.58 THOUSANDS/ÂΜL (ref 1.85–7.62)
NEUTS SEG NFR BLD AUTO: 52 % (ref 43–75)
NRBC BLD AUTO-RTO: 0 /100 WBCS
P AXIS: 40 DEGREES
PLATELET # BLD AUTO: 312 THOUSANDS/UL (ref 149–390)
PMV BLD AUTO: 10.7 FL (ref 8.9–12.7)
POTASSIUM SERPL-SCNC: 3.5 MMOL/L (ref 3.5–5.3)
PR INTERVAL: 154 MS
PROT SERPL-MCNC: 8.1 G/DL (ref 6.4–8.4)
QRS AXIS: 23 DEGREES
QRSD INTERVAL: 78 MS
QT INTERVAL: 386 MS
QTC INTERVAL: 401 MS
RBC # BLD AUTO: 5.06 MILLION/UL (ref 3.88–5.62)
SODIUM SERPL-SCNC: 140 MMOL/L (ref 135–147)
T WAVE AXIS: 11 DEGREES
VENTRICULAR RATE: 65 BPM
WBC # BLD AUTO: 10.73 THOUSAND/UL (ref 4.31–10.16)

## 2024-05-26 PROCEDURE — 83735 ASSAY OF MAGNESIUM: CPT | Performed by: EMERGENCY MEDICINE

## 2024-05-26 PROCEDURE — 96375 TX/PRO/DX INJ NEW DRUG ADDON: CPT

## 2024-05-26 PROCEDURE — 93010 ELECTROCARDIOGRAM REPORT: CPT | Performed by: STUDENT IN AN ORGANIZED HEALTH CARE EDUCATION/TRAINING PROGRAM

## 2024-05-26 PROCEDURE — 85025 COMPLETE CBC W/AUTO DIFF WBC: CPT | Performed by: EMERGENCY MEDICINE

## 2024-05-26 PROCEDURE — 71046 X-RAY EXAM CHEST 2 VIEWS: CPT

## 2024-05-26 PROCEDURE — 96361 HYDRATE IV INFUSION ADD-ON: CPT

## 2024-05-26 PROCEDURE — 96374 THER/PROPH/DIAG INJ IV PUSH: CPT

## 2024-05-26 PROCEDURE — 80053 COMPREHEN METABOLIC PANEL: CPT | Performed by: EMERGENCY MEDICINE

## 2024-05-26 PROCEDURE — 99285 EMERGENCY DEPT VISIT HI MDM: CPT | Performed by: EMERGENCY MEDICINE

## 2024-05-26 PROCEDURE — 36415 COLL VENOUS BLD VENIPUNCTURE: CPT | Performed by: EMERGENCY MEDICINE

## 2024-05-26 PROCEDURE — 83880 ASSAY OF NATRIURETIC PEPTIDE: CPT | Performed by: EMERGENCY MEDICINE

## 2024-05-26 PROCEDURE — 99285 EMERGENCY DEPT VISIT HI MDM: CPT

## 2024-05-26 PROCEDURE — 93005 ELECTROCARDIOGRAM TRACING: CPT

## 2024-05-26 PROCEDURE — 84484 ASSAY OF TROPONIN QUANT: CPT | Performed by: EMERGENCY MEDICINE

## 2024-05-26 RX ORDER — SUCRALFATE ORAL 1 G/10ML
1 SUSPENSION ORAL ONCE
Status: DISCONTINUED | OUTPATIENT
Start: 2024-05-26 | End: 2024-05-26

## 2024-05-26 RX ORDER — DIPHENHYDRAMINE HYDROCHLORIDE AND LIDOCAINE HYDROCHLORIDE AND ALUMINUM HYDROXIDE AND MAGNESIUM HYDRO
10 KIT ONCE
Status: COMPLETED | OUTPATIENT
Start: 2024-05-26 | End: 2024-05-26

## 2024-05-26 RX ORDER — SUCRALFATE 1 G/1
1 TABLET ORAL ONCE
Status: COMPLETED | OUTPATIENT
Start: 2024-05-26 | End: 2024-05-26

## 2024-05-26 RX ORDER — DIPHENHYDRAMINE HYDROCHLORIDE 50 MG/ML
25 INJECTION INTRAMUSCULAR; INTRAVENOUS ONCE
Status: COMPLETED | OUTPATIENT
Start: 2024-05-26 | End: 2024-05-26

## 2024-05-26 RX ORDER — DIPHENHYDRAMINE HYDROCHLORIDE AND LIDOCAINE HYDROCHLORIDE AND ALUMINUM HYDROXIDE AND MAGNESIUM HYDRO
10 KIT ONCE
Status: DISCONTINUED | OUTPATIENT
Start: 2024-05-26 | End: 2024-05-26

## 2024-05-26 RX ORDER — METOCLOPRAMIDE HYDROCHLORIDE 5 MG/ML
10 INJECTION INTRAMUSCULAR; INTRAVENOUS ONCE
Status: COMPLETED | OUTPATIENT
Start: 2024-05-26 | End: 2024-05-26

## 2024-05-26 RX ORDER — METOCLOPRAMIDE 10 MG/1
10 TABLET ORAL ONCE
Status: DISCONTINUED | OUTPATIENT
Start: 2024-05-26 | End: 2024-05-26

## 2024-05-26 RX ADMIN — DIPHENHYDRAMINE HYDROCHLORIDE AND LIDOCAINE HYDROCHLORIDE AND ALUMINUM HYDROXIDE AND MAGNESIUM HYDRO 10 ML: KIT at 23:16

## 2024-05-26 RX ADMIN — SODIUM CHLORIDE 1000 ML: 0.9 INJECTION, SOLUTION INTRAVENOUS at 22:33

## 2024-05-26 RX ADMIN — METOCLOPRAMIDE 10 MG: 5 INJECTION, SOLUTION INTRAMUSCULAR; INTRAVENOUS at 22:39

## 2024-05-26 RX ADMIN — DIPHENHYDRAMINE HYDROCHLORIDE 25 MG: 50 INJECTION, SOLUTION INTRAMUSCULAR; INTRAVENOUS at 22:34

## 2024-05-26 RX ADMIN — SUCRALFATE 1 G: 1 TABLET ORAL at 23:15

## 2024-05-27 VITALS
TEMPERATURE: 98.9 F | WEIGHT: 238.76 LBS | RESPIRATION RATE: 16 BRPM | SYSTOLIC BLOOD PRESSURE: 130 MMHG | OXYGEN SATURATION: 97 % | BODY MASS INDEX: 34.26 KG/M2 | HEART RATE: 59 BPM | DIASTOLIC BLOOD PRESSURE: 76 MMHG

## 2024-05-27 RX ORDER — SUCRALFATE ORAL 1 G/10ML
1 SUSPENSION ORAL 2 TIMES DAILY PRN
Qty: 40 ML | Refills: 0 | Status: SHIPPED | OUTPATIENT
Start: 2024-05-27 | End: 2024-05-29

## 2024-05-27 RX ORDER — DIPHENHYDRAMINE HYDROCHLORIDE AND LIDOCAINE HYDROCHLORIDE AND ALUMINUM HYDROXIDE AND MAGNESIUM HYDRO
10 KIT EVERY 8 HOURS PRN
Qty: 60 ML | Refills: 0 | Status: SHIPPED | OUTPATIENT
Start: 2024-05-27 | End: 2024-05-29

## 2024-05-27 NOTE — ED PROVIDER NOTES
History  Chief Complaint   Patient presents with    Chest Pain     With chest pressure that goes into throat started 1hr ago also with nausea      Patient is a 33-year-old male with a history of hypertension, GERD, manic depressive disorder coming in today complaining of chest discomfort.  He states that he chronically has this discomfort from his reflux in the past.  But over the past 2 hours the pain has intensified up into his throat.  He has some nauseousness without any vomiting.  He denies any fevers, chills, diaphoresis.  He denies any shortness of breath.  He denies any pain into this back.  He has no focal weakness and/or paresthesias throughout the bilateral upper extremities.  He has no palpitations, syncope or near syncope.  He has no lower extremity edema.    Patient reports that he has a test on Tuesday [facility]  His past medical also includes gastric ulcers from malaria pills taken while he was on duty.  He reports that they diagnosed him with reflux and going for further testing.      History provided by:  Medical records and patient   used: No    Chest Pain  Pain location:  Substernal area  Pain quality: aching, dull and pressure    Pain radiates to the back: no    Pain severity:  Mild  Onset quality:  Gradual  Timing:  Constant  Progression:  Waxing and waning  Chronicity:  Recurrent  Context: at rest    Relieved by:  Nothing  Worsened by:  Nothing tried  Ineffective treatments:  None tried  Associated symptoms: no numbness, no orthopnea, no palpitations, no PND and no shortness of breath        Prior to Admission Medications   Prescriptions Last Dose Informant Patient Reported? Taking?   Diclofenac Sodium (VOLTAREN) 1 %   Yes No   Sig: APPLY 4GM TO LOWER EXTREMITIES TOPICALLY THREE TIMES A DAY FOR PAIN AND INFLAMMATION **USE DOSING CARD** (DO NOT EXCEED 16 GRAMS DAILY TO ANY AFFECTED JOINT OF THE LOWER EXTREMITIES. DO NOT EXCEED 8 GRAMS  DAILY TO ANY AFFECTED JOINT OF THE UPPER EXTREMITIES. DO NOT EXCEED A TOTAL DOSE OF 32 GRAMS DAILY OVER ALL JOINTS)   acetaminophen (TYLENOL) 500 mg tablet   No No   Sig: Take 1 tablet (500 mg total) by mouth every 6 (six) hours as needed for mild pain, moderate pain or headaches   celecoxib (CeleBREX) 100 mg capsule   Yes No   Sig: Take 100 mg by mouth daily   cyclobenzaprine (FLEXERIL) 10 mg tablet   No No   Sig: Take 1 tablet (10 mg total) by mouth 3 (three) times a day as needed for muscle spasms for up to 3 days   divalproex sodium (DEPAKOTE ER) 500 mg 24 hr tablet   Yes No   Si,000 mg   hydrOXYzine HCL (ATARAX) 25 mg tablet   No No   Sig: Take 1 tablet (25 mg total) by mouth every 6 (six) hours   Patient not taking: Reported on 2023   lidocaine (LIDODERM) 5 %   Yes No   Sig: APPLY 2 PATCH TOPICALLY DAILY FOR PAIN (REMOVE PATCH AFTER 12 HOURS; 12 HOURS ON AND 12 HOURS OFF)   naproxen (NAPROSYN) 250 mg tablet   Yes No   Sig: Take 250 mg by mouth   Patient not taking: Reported on 2023   naproxen (Naprosyn) 500 mg tablet   No No   Sig: Take 1 tablet (500 mg total) by mouth 2 (two) times a day as needed for mild pain   omeprazole (PriLOSEC) 20 mg delayed release capsule   Yes No   Sig: TAKE 1 CAPSULE BY MOUTH EVERY DAY FOR STOMACH OR REFLUX      Facility-Administered Medications: None       Past Medical History:   Diagnosis Date    Anxiety     Chronic pain     GERD (gastroesophageal reflux disease)     Hypertension     Manic depressive disorder (HCC)        Past Surgical History:   Procedure Laterality Date    APPENDECTOMY LAPAROSCOPIC N/A 10/29/2021    Procedure: APPENDECTOMY LAPAROSCOPIC;  Surgeon: Glenis Molina MD;  Location: [facility];  Service: General    KNEE SURGERY      NO PAST SURGERIES         Family History   Problem Relation Age of Onset    Hypertension Mother     Diabetes Father      I have reviewed and agree with the history as documented.    E-Cigarette/Vaping      E-Cigarette/Vaping Substances     Social History     Tobacco Use    Smoking status: Never    Smokeless tobacco: Never   Substance Use Topics    Alcohol use: No    Drug use: No       Review of Systems   Respiratory:  Negative for shortness of breath.    Cardiovascular:  Positive for chest pain. Negative for palpitations, orthopnea and PND.   Neurological:  Negative for numbness.       Physical Exam  Physical Exam  Vitals and nursing note reviewed.   Constitutional:       General: He is not in acute distress.     Appearance: He is well-developed.   HENT:      Head: Normocephalic and atraumatic.      Comments: Patient maintaining airway and secretions. No stridor . No brawniness under tongue.       Eyes:      Extraocular Movements: Extraocular movements intact.      Conjunctiva/sclera: Conjunctivae normal.      Pupils: Pupils are equal, round, and reactive to light.   Cardiovascular:      Rate and Rhythm: Normal rate and regular rhythm.      Pulses:           Radial pulses are 2+ on the right side and 2+ on the left side.        Dorsalis pedis pulses are 2+ on the right side and 2+ on the left side.      Heart sounds: Normal heart sounds, S1 normal and S2 normal. No murmur heard.  Pulmonary:      Effort: Pulmonary effort is normal. No respiratory distress.      Breath sounds: Normal breath sounds.   Abdominal:      General: Abdomen is flat. Bowel sounds are normal.      Palpations: Abdomen is soft.      Tenderness: There is no abdominal tenderness.   Musculoskeletal:         General: No swelling.      Cervical back: Normal range of motion and neck supple.      Right lower leg: No edema.      Left lower leg: No edema.   Skin:     General: Skin is warm and dry.      Capillary Refill: Capillary refill takes less than 2 seconds.   Neurological:      General: No focal deficit present.      Mental Status: He is alert and oriented to person, place, and time.      GCS: GCS eye subscore is 4. GCS verbal subscore is 5. GCS  motor subscore is 6.      Cranial Nerves: Cranial nerves 2-12 are intact.      Sensory: Sensation is intact.      Motor: Motor function is intact.      Coordination: Coordination is intact.   Psychiatric:         Mood and Affect: Mood normal.         Vital Signs  ED Triage Vitals [05/26/24 2219]   Temperature Pulse Respirations Blood Pressure SpO2   98.9 °F (37.2 °C) 71 16 (!) 141/102 96 %      Temp Source Heart Rate Source Patient Position - Orthostatic VS BP Location FiO2 (%)   Oral Monitor Sitting Right arm --      Pain Score       3           Vitals:    05/26/24 2219 05/26/24 2300 05/26/24 2330 05/27/24 0000   BP: (!) 141/102 127/75 124/68 130/76   Pulse: 71 60 60 59   Patient Position - Orthostatic VS: Sitting          Visual Acuity      ED Medications  Medications   sodium chloride 0.9 % bolus 1,000 mL (0 mL Intravenous Stopped 5/26/24 2333)   metoclopramide (REGLAN) injection 10 mg (10 mg Intravenous Given 5/26/24 2239)   diphenhydrAMINE (BENADRYL) injection 25 mg (25 mg Intravenous Given 5/26/24 2234)   sucralfate (CARAFATE) tablet 1 g (1 g Oral Given 5/26/24 2315)   diphenhydramine, lidocaine, Al/Mg hydroxide, simethicone (Magic Mouthwash) oral solution 10 mL (10 mL Swish & Spit Given 5/26/24 2316)       Diagnostic Studies  Results Reviewed       Procedure Component Value Units Date/Time    B-Type Natriuretic Peptide(BNP) [[id number]]  (Normal) Collected: 05/26/24 2237    Lab Status: Final result Specimen: Blood from Arm, Left Updated: 05/26/24 2308     BNP 17 pg/mL     HS Troponin 0hr (reflex protocol) [[id number]]  (Normal) Collected: 05/26/24 2237    Lab Status: Final result Specimen: Blood from Arm, Left Updated: 05/26/24 2307     hs TnI 0hr <2 ng/L     Comprehensive metabolic panel [[id number]] Collected: 05/26/24 2237    Lab Status: Final result Specimen: Blood from Arm, Left Updated: 05/26/24 2302     Sodium 140 mmol/L      Potassium 3.5 mmol/L      Chloride 103 mmol/L      CO2 26 mmol/L      ANION GAP  11 mmol/L      BUN 12 mg/dL      Creatinine 1.07 mg/dL      Glucose 104 mg/dL      Calcium 9.9 mg/dL      AST 18 U/L      ALT 17 U/L      Alkaline Phosphatase 88 U/L      Total Protein 8.1 g/dL      Albumin 4.8 g/dL      Total Bilirubin 0.64 mg/dL      eGFR 90 ml/min/1.73sq m     Narrative:      National Kid[employer] Foundation guidelines for Chronic Kidney Disease (CKD):     Stage 1 with normal or high GFR (GFR > 90 mL/min/1.73 square meters)    Stage 2 Mild CKD (GFR = 60-89 mL/min/1.73 square meters)    Stage 3A Moderate CKD (GFR = 45-59 mL/min/1.73 square meters)    Stage 3B Moderate CKD (GFR = 30-44 mL/min/1.73 square meters)    Stage 4 Severe CKD (GFR = 15-29 mL/min/1.73 square meters)    Stage 5 End Stage CKD (GFR <15 mL/min/1.73 square meters)  Note: GFR calculation is accurate only with a steady state creatinine    Magnesium [[id number]]  (Normal) Collected: 05/26/24 2237    Lab Status: Final result Specimen: Blood from Arm, Left Updated: 05/26/24 2302     Magnesium 2.1 mg/dL     CBC and differential [[id number]]  (Abnormal) Collected: 05/26/24 2237    Lab Status: Final result Specimen: Blood from Arm, Left Updated: 05/26/24 2245     WBC 10.73 Thousand/uL      RBC 5.06 Million/uL      Hemoglobin 14.9 g/dL      Hematocrit 44.2 %      MCV 87 fL      MCH 29.4 pg      MCHC 33.7 g/dL      RDW 12.2 %      MPV 10.7 fL      Platelets 312 Thousands/uL      nRBC 0 /100 WBCs      Segmented % 52 %      Immature Grans % 0 %      Lymphocytes % 40 %      Monocytes % 7 %      Eosinophils Relative 0 %      Basophils Relative 1 %      Absolute Neutrophils 5.58 Thousands/µL      Absolute Immature Grans 0.02 Thousand/uL      Absolute Lymphocytes 4.27 Thousands/µL      Absolute Monocytes 0.76 Thousand/µL      Eosinophils Absolute 0.04 Thousand/µL      Basophils Absolute 0.06 Thousands/µL                    XR chest 2 views   ED Interpretation by Lilia Garcia DO (05/26 2308)   No acute findings      Final Result by Kristy  Vicky Pelletier MD (05/26 5515)      No acute cardiopulmonary disease.            Workstation performed: [id number]                    Procedures  ECG 12 Lead Documentation Only    Date/Time: 5/26/2024 10:21 PM    Performed by: Lilia Garcia DO  Authorized by: Lilia Garcia DO    Indications / Diagnosis:  Cp  ECG reviewed by me, the ED Provider: yes    Patient location:  ED  Previous ECG:     Previous ECG:  Compared to current    Comparison ECG info:  10/31/22    Similarity:  No change  Interpretation:     Interpretation: normal    Quality:     Tracing quality:  Limited by artifact  Rate:     ECG rate:  65    ECG rate assessment: normal    Rhythm:     Rhythm: sinus rhythm    Ectopy:     Ectopy: none    QRS:     QRS axis:  Normal    QRS intervals:  Normal  Conduction:     Conduction: normal    ST segments:     ST segments:  Non-specific  T waves:     T waves: non-specific    Comments:      QTC @ 401 ms  Normal axis             ED Course  ED Course as of 05/27/24 0016   Sun May 26, 2024   2222 Patient is a 33-year-old male here with girlfriend coming in today with he describes his pain and intensified.  On exam he is resting in bed in no distress.  He does have a history of hypertension.  EKG is nonischemic with no arrhythmia.  Will start cardiac workup and will also give Magic mouthwash, Carafate and Reglan for symptomatic control      Counseling: I had a detailed discussion with the patient and/or guardian regarding: the historical points, exam findings, and any diagnostic results supporting the discharge diagnosis, lab results, radiology results, discharge instructions reviewed with patient and/or family/caregiver and understanding was verbalized. Instructions given to return to the emergency department if symptoms worsen or persist, or if there are any questions or concerns that arise at home.     All imaging and/or lab testing discussed with patient, strict return to ED precautions discussed. Patient  recommended to follow up promptly with appropriate outpatient provider. Patient and/or family members verbalizes understanding and agrees with plan. Patient and/or family members were given opportunity to ask questions, all questions were answered at this time. Patient is stable for discharge       2231 Will change oral meds to IV at this time as patient is vomiting   2308 Labs reviewed and without actionable derangement    Mild leukocytosis without any bands.  Pending proBNP   2357 Patient's vital improved. Will reassess   Mon May 27, 2024   0007 Patient resting in bed.  Patient updated on labs, troponin, EKG x-ray.  He was able to tolerate Carafate and Magic mouthwash.  Discussed with him to continue to keep his appointment for barium swallow and will refrain from any PPI at this time.  I discussed with him we do not wish for a false negative study.  Will give instructions for bland diet, Carafate and Magic mouthwash for home.      Counseling: I had a detailed discussion with the patient and/or guardian regarding: the historical points, exam findings, and any diagnostic results supporting the discharge diagnosis, lab results, radiology results, discharge instructions reviewed with patient and/or family/caregiver and understanding was verbalized. Instructions given to return to the emergency department if symptoms worsen or persist, or if there are any questions or concerns that arise at home.     All imaging and/or lab testing discussed with patient, strict return to ED precautions discussed. Patient recommended to follow up promptly with appropriate outpatient provider. Patient and/or family members verbalizes understanding and agrees with plan. Patient and/or family members were given opportunity to ask questions, all questions were answered at this time. Patient is stable for discharge                 HEART Risk Score      Flowsheet Row Most Recent Value   Heart Score Risk Calculator    History 0 Filed at:  05/26/2024 2309   ECG 1 Filed at: 05/26/2024 2309   Age 0 Filed at: 05/26/2024 2309   Risk Factors 1 Filed at: 05/26/2024 2309   Troponin 0 Filed at: 05/26/2024 2309   HEART Score 2 Filed at: 05/26/2024 2309                  PERC Rule for PE      Flowsheet Row Most Recent Value   PERC Rule for PE    Age >=50 0 Filed at: 05/26/2024 2309   HR >=100 0 Filed at: 05/26/2024 2309   O2 Sat on room air < 95% 0 Filed at: 05/26/2024 2309   History of PE or DVT 0 Filed at: 05/26/2024 2309   Recent trauma or surgery 0 Filed at: 05/26/2024 2309   Hemoptysis 0 Filed at: 05/26/2024 2309   Exogenous estrogen 0 Filed at: 05/26/2024 2309   Unilateral leg swelling 0 Filed at: 05/26/2024 2309   PERC Rule for PE Results 0 Filed at: 05/26/2024 2309                    Wells' Criteria for PE      Flowsheet Row Most Recent Value   Wells' Criteria for PE    Clinical signs and symptoms of DVT 0 Filed at: 05/26/2024 2309   PE is primary diagnosis or equally likely 0 Filed at: 05/26/2024 2309   HR >100 0 Filed at: 05/26/2024 2309   Immobilization at least 3 days or Surgery in the previous 4 weeks 0 Filed at: 05/26/2024 2309   Previous, objectively diagnosed PE or DVT 0 Filed at: 05/26/2024 2309   Hemoptysis 0 Filed at: 05/26/2024 2309   Malignancy with treatment within 6 months or palliative 0 Filed at: 05/26/2024 2309   Wells' Criteria Total 0 Filed at: 05/26/2024 2309                  Medical Decision Making      Different diagnosis : ACS, NSTEMI, pleurisry, pneumothorax, costochondritis, pneumonia, GERD, anxiety, hepatitis, pancreatitis, aortic dissection, pericarditis, myocarditis, pericardial effusion, asthma exacerbation, COPD exacerbation, herpes zoster, peritoneal rupture, esophageal spasm.        Amount and/or Complexity of Data Reviewed  Independent Historian: friend     Details: Patient's girlfriend at bedside  External Data Reviewed: notes.     Details: Chart review shows that patient was seen by GI on May 21, 2024.  According to  documentation he has 2 years of intermittent bouts of oropharyngeal and esophageal dysphagia.  Last EGD was 2 to 3 years ago.  Patient reports that he was supposed to be taking his omeprazole as well but has stopped due to upcoming test.    Labs: ordered. Decision-making details documented in ED Course.     Details: No anemia, thrombocytopenia but noted mild leukocytosis  No acute kidney injury or electrolyte dysfunction  Troponin less than 2 with a heart score less than 3    Radiology: ordered and independent interpretation performed. Decision-making details documented in ED Course.     Details: Chest x-ray interpreted by myself at no acute findings  ECG/medicine tests: ordered and independent interpretation performed. Decision-making details documented in ED Course.     Details: EKG without ischemia or arrhythmia    Risk  Prescription drug management.             Disposition  Final diagnoses:   Atypical chest pain   GERD (gastroesophageal reflux disease)     Time reflects when diagnosis was documented in both MDM as applicable and the Disposition within this note       Time User Action Codes Description Comment    5/27/2024 12:09 AM Lilia Garica [R07.89] Atypical chest pain     5/27/2024 12:09 AM Lilia Garcia [K21.9] GERD (gastroesophageal reflux disease)           ED Disposition       ED Disposition   Discharge    Condition   Stable    Date/Time   Mon May 27, 2024 0009    Comment   Manish Billo discharge to home/self care.                   Follow-up Information       Follow up With Specialties Details Why Contact Info    Durga Ji MD Internal Medicine Schedule an appointment as soon as possible for a visit in 1 week  [address] [facility] [place] [address] [phone]              Patient's Medications   Discharge Prescriptions    DIPHENHYDRAMINE, LIDOCAINE, AL/MG HYDROXIDE, SIMETHICONE (MAGIC MOUTHWASH) SUSP    Swish and spit 10 mL every 8 (eight) hours as needed for mouth pain or  discomfort for up to 2 days       Start Date: 5/27/2024 End Date: 5/29/2024       Order Dose: 10 mL       Quantity: 60 mL    Refills: 0    SUCRALFATE (CARAFATE) 1 G/10 ML SUSPENSION    Take 10 mL (1 g total) by mouth 2 (two) times a day as needed (abd pain/ throat pain) for up to 2 days       Start Date: 5/27/2024 End Date: 5/29/2024       Order Dose: 1 g       Quantity: 40 mL    Refills: 0       No discharge procedures on file.    PDMP Review         Value Time User    PDMP Reviewed  Yes 10/5/2020  3:55 PM Mikki Rincon PA-C            ED Provider  Electronically Signed by             Lilia Garcia DO  05/27/24 0017

## 2024-05-27 NOTE — DISCHARGE INSTRUCTIONS
As discussed, avoid any medications that can alter.  Barium swallow study on Tuesday.    Please keep to your barium swallow study on Tuesday and stick to a very bland diet

## 2024-05-28 ENCOUNTER — ANESTHESIA (OUTPATIENT)
Dept: GASTROENTEROLOGY | Facility: MEDICAL CENTER | Age: 34
End: 2024-05-28

## 2024-05-28 ENCOUNTER — HOSPITAL ENCOUNTER (OUTPATIENT)
Dept: GASTROENTEROLOGY | Facility: MEDICAL CENTER | Age: 34
Setting detail: OUTPATIENT SURGERY
Discharge: HOME | End: 2024-05-28
Payer: COMMERCIAL

## 2024-05-28 ENCOUNTER — ANESTHESIA EVENT (OUTPATIENT)
Dept: GASTROENTEROLOGY | Facility: MEDICAL CENTER | Age: 34
End: 2024-05-28

## 2024-05-28 VITALS
RESPIRATION RATE: 18 BRPM | DIASTOLIC BLOOD PRESSURE: 87 MMHG | TEMPERATURE: 97.7 F | SYSTOLIC BLOOD PRESSURE: 135 MMHG | HEART RATE: 71 BPM | OXYGEN SATURATION: 98 %

## 2024-05-28 DIAGNOSIS — R13.10 DYSPHAGIA, UNSPECIFIED TYPE: ICD-10-CM

## 2024-05-28 DIAGNOSIS — R11.0 NAUSEA: ICD-10-CM

## 2024-05-28 PROCEDURE — 43239 EGD BIOPSY SINGLE/MULTIPLE: CPT | Performed by: INTERNAL MEDICINE

## 2024-05-28 PROCEDURE — 88305 TISSUE EXAM BY PATHOLOGIST: CPT | Performed by: SPECIALIST

## 2024-05-28 RX ORDER — SODIUM CHLORIDE 9 MG/ML
125 INJECTION, SOLUTION INTRAVENOUS CONTINUOUS
Status: DISCONTINUED | OUTPATIENT
Start: 2024-05-28 | End: 2024-06-01 | Stop reason: HOSPADM

## 2024-05-28 RX ORDER — PROPOFOL 10 MG/ML
INJECTION, EMULSION INTRAVENOUS AS NEEDED
Status: DISCONTINUED | OUTPATIENT
Start: 2024-05-28 | End: 2024-05-28

## 2024-05-28 RX ORDER — LIDOCAINE HYDROCHLORIDE 20 MG/ML
INJECTION, SOLUTION EPIDURAL; INFILTRATION; INTRACAUDAL; PERINEURAL AS NEEDED
Status: DISCONTINUED | OUTPATIENT
Start: 2024-05-28 | End: 2024-05-28

## 2024-05-28 RX ADMIN — PROPOFOL 140 MG: 10 INJECTION, EMULSION INTRAVENOUS at 14:25

## 2024-05-28 RX ADMIN — PROPOFOL 80 MG: 10 INJECTION, EMULSION INTRAVENOUS at 14:27

## 2024-05-28 RX ADMIN — LIDOCAINE HYDROCHLORIDE 80 MG: 20 INJECTION, SOLUTION EPIDURAL; INFILTRATION; INTRACAUDAL at 14:25

## 2024-05-28 RX ADMIN — SODIUM CHLORIDE 125 ML/HR: 0.9 INJECTION, SOLUTION INTRAVENOUS at 14:21

## 2024-05-28 NOTE — ANESTHESIA POSTPROCEDURE EVALUATION
Post-Op Assessment Note    CV Status:  Stable    Pain management: adequate       Hydration Status:  Stable   Airway Patency:  Patent     Post Op Vitals Reviewed: Yes    No anethesia notable event occurred.    Staff: Anesthesiologist               BP   134/86   Temp      Pulse  86   Resp   20   SpO2   92%

## 2024-05-30 PROCEDURE — 88305 TISSUE EXAM BY PATHOLOGIST: CPT | Performed by: SPECIALIST

## 2024-05-30 NOTE — RESULT ENCOUNTER NOTE
Hi,    Can you please let him know he has H pylori and can you initiate the H pylori treatment protocol. He also has some inflammation in the duodenum and I would like him to get a celiac panel send.     Thank you!

## 2024-05-31 ENCOUNTER — APPOINTMENT (OUTPATIENT)
Dept: LAB | Facility: HOSPITAL | Age: 34
End: 2024-05-31
Payer: COMMERCIAL

## 2024-05-31 DIAGNOSIS — K27.9 H PYLORI ULCER: Primary | ICD-10-CM

## 2024-05-31 DIAGNOSIS — R13.10 DYSPHAGIA, UNSPECIFIED TYPE: ICD-10-CM

## 2024-05-31 DIAGNOSIS — K29.80 INFLAMMATION PRESENT ON BIOPSY OF DUODENUM: ICD-10-CM

## 2024-05-31 DIAGNOSIS — A04.8 H. PYLORI INFECTION: Primary | ICD-10-CM

## 2024-05-31 DIAGNOSIS — B96.81 H PYLORI ULCER: Primary | ICD-10-CM

## 2024-05-31 DIAGNOSIS — R11.0 NAUSEA: ICD-10-CM

## 2024-05-31 LAB
ALBUMIN SERPL BCP-MCNC: 5 G/DL (ref 3.5–5)
ALP SERPL-CCNC: 85 U/L (ref 34–104)
ALT SERPL W P-5'-P-CCNC: 16 U/L (ref 7–52)
ANION GAP SERPL CALCULATED.3IONS-SCNC: 11 MMOL/L (ref 4–13)
AST SERPL W P-5'-P-CCNC: 17 U/L (ref 13–39)
BASOPHILS # BLD AUTO: 0.03 THOUSANDS/ÂΜL (ref 0–0.1)
BASOPHILS NFR BLD AUTO: 0 % (ref 0–1)
BILIRUB SERPL-MCNC: 0.44 MG/DL (ref 0.2–1)
BUN SERPL-MCNC: 15 MG/DL (ref 5–25)
CALCIUM SERPL-MCNC: 10.5 MG/DL (ref 8.4–10.2)
CHLORIDE SERPL-SCNC: 102 MMOL/L (ref 96–108)
CO2 SERPL-SCNC: 28 MMOL/L (ref 21–32)
CREAT SERPL-MCNC: 1.12 MG/DL (ref 0.6–1.3)
EOSINOPHIL # BLD AUTO: 0.02 THOUSAND/ÂΜL (ref 0–0.61)
EOSINOPHIL NFR BLD AUTO: 0 % (ref 0–6)
ERYTHROCYTE [DISTWIDTH] IN BLOOD BY AUTOMATED COUNT: 12.1 % (ref 11.6–15.1)
GFR SERPL CREATININE-BSD FRML MDRD: 85 ML/MIN/1.73SQ M
GLIADIN PEPTIDE IGA SER-ACNC: 1.1 U/ML
GLIADIN PEPTIDE IGA SER-ACNC: NEGATIVE
GLIADIN PEPTIDE IGG SER-ACNC: <0.4 U/ML
GLIADIN PEPTIDE IGG SER-ACNC: NEGATIVE
GLUCOSE P FAST SERPL-MCNC: 89 MG/DL (ref 65–99)
HCT VFR BLD AUTO: 43 % (ref 36.5–49.3)
HGB BLD-MCNC: 14.9 G/DL (ref 12–17)
IGA SERPL-MCNC: 316 MG/DL (ref 66–433)
IMM GRANULOCYTES # BLD AUTO: 0.02 THOUSAND/UL (ref 0–0.2)
IMM GRANULOCYTES NFR BLD AUTO: 0 % (ref 0–2)
LIPASE SERPL-CCNC: 32 U/L (ref 11–82)
LYMPHOCYTES # BLD AUTO: 2 THOUSANDS/ÂΜL (ref 0.6–4.47)
LYMPHOCYTES NFR BLD AUTO: 27 % (ref 14–44)
MCH RBC QN AUTO: 29.9 PG (ref 26.8–34.3)
MCHC RBC AUTO-ENTMCNC: 34.7 G/DL (ref 31.4–37.4)
MCV RBC AUTO: 86 FL (ref 82–98)
MONOCYTES # BLD AUTO: 0.44 THOUSAND/ÂΜL (ref 0.17–1.22)
MONOCYTES NFR BLD AUTO: 6 % (ref 4–12)
NEUTROPHILS # BLD AUTO: 4.98 THOUSANDS/ÂΜL (ref 1.85–7.62)
NEUTS SEG NFR BLD AUTO: 67 % (ref 43–75)
NRBC BLD AUTO-RTO: 0 /100 WBCS
PLATELET # BLD AUTO: 300 THOUSANDS/UL (ref 149–390)
PMV BLD AUTO: 10.8 FL (ref 8.9–12.7)
POTASSIUM SERPL-SCNC: 3.8 MMOL/L (ref 3.5–5.3)
PROT SERPL-MCNC: 8.5 G/DL (ref 6.4–8.4)
RBC # BLD AUTO: 4.98 MILLION/UL (ref 3.88–5.62)
SODIUM SERPL-SCNC: 141 MMOL/L (ref 135–147)
TTG IGA SER-ACNC: <0.5 U/ML
TTG IGA SER-ACNC: NEGATIVE
TTG IGG SER-ACNC: <0.8 U/ML
TTG IGG SER-ACNC: NEGATIVE
WBC # BLD AUTO: 7.49 THOUSAND/UL (ref 4.31–10.16)

## 2024-05-31 PROCEDURE — 86364 TISS TRNSGLTMNASE EA IG CLAS: CPT

## 2024-05-31 PROCEDURE — 85025 COMPLETE CBC W/AUTO DIFF WBC: CPT

## 2024-05-31 PROCEDURE — 83690 ASSAY OF LIPASE: CPT

## 2024-05-31 PROCEDURE — 82784 ASSAY IGA/IGD/IGG/IGM EACH: CPT

## 2024-05-31 PROCEDURE — 86258 DGP ANTIBODY EACH IG CLASS: CPT

## 2024-05-31 PROCEDURE — 80053 COMPREHEN METABOLIC PANEL: CPT

## 2024-05-31 PROCEDURE — 36415 COLL VENOUS BLD VENIPUNCTURE: CPT

## 2024-05-31 RX ORDER — BISMUTH SUBSALICYLATE 262 MG/1
262 TABLET, CHEWABLE ORAL
Qty: 56 TABLET | Refills: 0 | Status: SHIPPED | OUTPATIENT
Start: 2024-05-31 | End: 2024-06-05 | Stop reason: SDUPTHER

## 2024-05-31 RX ORDER — TETRACYCLINE HYDROCHLORIDE 500 MG/1
500 CAPSULE ORAL EVERY 6 HOURS
Qty: 56 CAPSULE | Refills: 0 | Status: SHIPPED | OUTPATIENT
Start: 2024-05-31 | End: 2024-06-14

## 2024-05-31 RX ORDER — METRONIDAZOLE 250 MG/1
250 TABLET ORAL EVERY 6 HOURS
Qty: 56 TABLET | Refills: 0 | Status: SHIPPED | OUTPATIENT
Start: 2024-05-31 | End: 2024-06-05 | Stop reason: SDUPTHER

## 2024-05-31 RX ORDER — TETRACYCLINE HYDROCHLORIDE 500 MG/1
500 CAPSULE ORAL EVERY 6 HOURS
Qty: 56 CAPSULE | Refills: 0 | Status: SHIPPED | OUTPATIENT
Start: 2024-05-31 | End: 2024-05-31 | Stop reason: SDUPTHER

## 2024-05-31 RX ORDER — TETRACYCLINE HYDROCHLORIDE 500 MG/1
500 CAPSULE ORAL 4 TIMES DAILY
Qty: 56 CAPSULE | Refills: 0 | Status: SHIPPED | OUTPATIENT
Start: 2024-05-31 | End: 2024-06-14

## 2024-05-31 RX ORDER — OMEPRAZOLE 20 MG/1
20 CAPSULE, DELAYED RELEASE ORAL EVERY 12 HOURS
Qty: 28 CAPSULE | Refills: 0 | Status: SHIPPED | OUTPATIENT
Start: 2024-05-31 | End: 2024-06-05 | Stop reason: SDUPTHER

## 2024-05-31 NOTE — TELEPHONE ENCOUNTER
Patient called Rx refill line because the pharmacy told him that the tetracycline (ACHROMYCIN,SUMYCIN) 500 MG capsule requires a prior auth    He has [facility] [employer] and [facility] insurance

## 2024-05-31 NOTE — TELEPHONE ENCOUNTER
Called , DOD--pt not foung  Called pt and advised not found in records  Pt says [facility]  [phone]  Option 1    Called [facility], Was told [facility] has to be sent the script and they fill it and mail to patient.      To escribe     Fax   [facility] pharmacy    Called pt and advised  Routing to  to please send the Tetracycline to above.

## 2024-06-01 LAB — TTG IGA SER-ACNC: <2 U/ML (ref 0–3)

## 2024-06-03 ENCOUNTER — TELEPHONE (OUTPATIENT)
Dept: GASTROENTEROLOGY | Facility: CLINIC | Age: 34
End: 2024-06-03

## 2024-06-05 DIAGNOSIS — A04.8 H. PYLORI INFECTION: ICD-10-CM

## 2024-06-05 RX ORDER — METRONIDAZOLE 250 MG/1
250 TABLET ORAL EVERY 6 HOURS
Qty: 56 TABLET | Refills: 0 | Status: SHIPPED | OUTPATIENT
Start: 2024-06-05 | End: 2024-06-19

## 2024-06-05 RX ORDER — OMEPRAZOLE 20 MG/1
20 CAPSULE, DELAYED RELEASE ORAL EVERY 12 HOURS
Qty: 28 CAPSULE | Refills: 0 | Status: SHIPPED | OUTPATIENT
Start: 2024-06-05 | End: 2024-06-19

## 2024-06-05 RX ORDER — BISMUTH SUBSALICYLATE 262 MG/1
262 TABLET, CHEWABLE ORAL
Qty: 56 TABLET | Refills: 0 | Status: SHIPPED | OUTPATIENT
Start: 2024-06-05

## 2024-09-24 ENCOUNTER — HOSPITAL ENCOUNTER (EMERGENCY)
Facility: HOSPITAL | Age: 34
Discharge: HOME | End: 2024-09-24
Attending: EMERGENCY MEDICINE
Payer: COMMERCIAL

## 2024-09-24 ENCOUNTER — APPOINTMENT (EMERGENCY)
Dept: RADIOLOGY | Facility: HOSPITAL | Age: 34
End: 2024-09-24
Payer: COMMERCIAL

## 2024-09-24 VITALS
DIASTOLIC BLOOD PRESSURE: 84 MMHG | HEART RATE: 77 BPM | WEIGHT: 234.13 LBS | RESPIRATION RATE: 18 BRPM | OXYGEN SATURATION: 94 % | SYSTOLIC BLOOD PRESSURE: 133 MMHG | BODY MASS INDEX: 33.59 KG/M2 | TEMPERATURE: 99.3 F

## 2024-09-24 DIAGNOSIS — R07.9 CHEST PAIN: Primary | ICD-10-CM

## 2024-09-24 LAB
ALBUMIN SERPL BCG-MCNC: 4.3 G/DL (ref 3.5–5)
ALP SERPL-CCNC: 84 U/L (ref 34–104)
ALT SERPL W P-5'-P-CCNC: 16 U/L (ref 7–52)
ANION GAP SERPL CALCULATED.3IONS-SCNC: 7 MMOL/L (ref 4–13)
AST SERPL W P-5'-P-CCNC: 15 U/L (ref 13–39)
BASOPHILS # BLD AUTO: 0.05 THOUSANDS/ΜL (ref 0–0.1)
BASOPHILS NFR BLD AUTO: 1 % (ref 0–1)
BILIRUB SERPL-MCNC: 0.22 MG/DL (ref 0.2–1)
BUN SERPL-MCNC: 10 MG/DL (ref 5–25)
CALCIUM SERPL-MCNC: 9.2 MG/DL (ref 8.4–10.2)
CARDIAC TROPONIN I PNL SERPL HS: <2 NG/L
CHLORIDE SERPL-SCNC: 106 MMOL/L (ref 96–108)
CK SERPL-CCNC: 77 U/L (ref 39–308)
CO2 SERPL-SCNC: 27 MMOL/L (ref 21–32)
CREAT SERPL-MCNC: 0.95 MG/DL (ref 0.6–1.3)
EOSINOPHIL # BLD AUTO: 0.05 THOUSAND/ΜL (ref 0–0.61)
EOSINOPHIL NFR BLD AUTO: 1 % (ref 0–6)
ERYTHROCYTE [DISTWIDTH] IN BLOOD BY AUTOMATED COUNT: 12.1 % (ref 11.6–15.1)
GFR SERPL CREATININE-BSD FRML MDRD: 104 ML/MIN/1.73SQ M
GLUCOSE SERPL-MCNC: 109 MG/DL (ref 65–140)
HCT VFR BLD AUTO: 41.5 % (ref 36.5–49.3)
HGB BLD-MCNC: 13.9 G/DL (ref 12–17)
IMM GRANULOCYTES # BLD AUTO: 0.02 THOUSAND/UL (ref 0–0.2)
IMM GRANULOCYTES NFR BLD AUTO: 0 % (ref 0–2)
LYMPHOCYTES # BLD AUTO: 3.41 THOUSANDS/ΜL (ref 0.6–4.47)
LYMPHOCYTES NFR BLD AUTO: 38 % (ref 14–44)
MCH RBC QN AUTO: 29.5 PG (ref 26.8–34.3)
MCHC RBC AUTO-ENTMCNC: 33.5 G/DL (ref 31.4–37.4)
MCV RBC AUTO: 88 FL (ref 82–98)
MONOCYTES # BLD AUTO: 0.49 THOUSAND/ΜL (ref 0.17–1.22)
MONOCYTES NFR BLD AUTO: 6 % (ref 4–12)
NEUTROPHILS # BLD AUTO: 4.88 THOUSANDS/ΜL (ref 1.85–7.62)
NEUTS SEG NFR BLD AUTO: 54 % (ref 43–75)
NRBC BLD AUTO-RTO: 0 /100 WBCS
PLATELET # BLD AUTO: 300 THOUSANDS/UL (ref 149–390)
PMV BLD AUTO: 10.3 FL (ref 8.9–12.7)
POTASSIUM SERPL-SCNC: 3.8 MMOL/L (ref 3.5–5.3)
PROT SERPL-MCNC: 7.1 G/DL (ref 6.4–8.4)
RBC # BLD AUTO: 4.71 MILLION/UL (ref 3.88–5.62)
SODIUM SERPL-SCNC: 140 MMOL/L (ref 135–147)
WBC # BLD AUTO: 8.9 THOUSAND/UL (ref 4.31–10.16)

## 2024-09-24 PROCEDURE — 96375 TX/PRO/DX INJ NEW DRUG ADDON: CPT

## 2024-09-24 PROCEDURE — 99285 EMERGENCY DEPT VISIT HI MDM: CPT | Performed by: EMERGENCY MEDICINE

## 2024-09-24 PROCEDURE — 71045 X-RAY EXAM CHEST 1 VIEW: CPT

## 2024-09-24 PROCEDURE — 96365 THER/PROPH/DIAG IV INF INIT: CPT

## 2024-09-24 PROCEDURE — 82550 ASSAY OF CK (CPK): CPT

## 2024-09-24 PROCEDURE — 93005 ELECTROCARDIOGRAM TRACING: CPT

## 2024-09-24 PROCEDURE — 36415 COLL VENOUS BLD VENIPUNCTURE: CPT

## 2024-09-24 PROCEDURE — 85025 COMPLETE CBC W/AUTO DIFF WBC: CPT

## 2024-09-24 PROCEDURE — 84484 ASSAY OF TROPONIN QUANT: CPT

## 2024-09-24 PROCEDURE — 99284 EMERGENCY DEPT VISIT MOD MDM: CPT

## 2024-09-24 PROCEDURE — 80053 COMPREHEN METABOLIC PANEL: CPT

## 2024-09-24 RX ORDER — KETOROLAC TROMETHAMINE 30 MG/ML
15 INJECTION, SOLUTION INTRAMUSCULAR; INTRAVENOUS ONCE
Status: COMPLETED | OUTPATIENT
Start: 2024-09-24 | End: 2024-09-24

## 2024-09-24 RX ORDER — SODIUM CHLORIDE 9 MG/ML
3 INJECTION INTRAVENOUS
Status: DISCONTINUED | OUTPATIENT
Start: 2024-09-24 | End: 2024-09-25 | Stop reason: HOSPADM

## 2024-09-24 RX ORDER — ACETAMINOPHEN 325 MG/1
650 TABLET ORAL ONCE
Status: COMPLETED | OUTPATIENT
Start: 2024-09-24 | End: 2024-09-24

## 2024-09-24 RX ORDER — SODIUM CHLORIDE, SODIUM GLUCONATE, SODIUM ACETATE, POTASSIUM CHLORIDE, MAGNESIUM CHLORIDE, SODIUM PHOSPHATE, DIBASIC, AND POTASSIUM PHOSPHATE .53; .5; .37; .037; .03; .012; .00082 G/100ML; G/100ML; G/100ML; G/100ML; G/100ML; G/100ML; G/100ML
1000 INJECTION, SOLUTION INTRAVENOUS ONCE
Status: COMPLETED | OUTPATIENT
Start: 2024-09-24 | End: 2024-09-24

## 2024-09-24 RX ADMIN — SODIUM CHLORIDE, SODIUM GLUCONATE, SODIUM ACETATE, POTASSIUM CHLORIDE, MAGNESIUM CHLORIDE, SODIUM PHOSPHATE, DIBASIC, AND POTASSIUM PHOSPHATE 1000 ML: .53; .5; .37; .037; .03; .012; .00082 INJECTION, SOLUTION INTRAVENOUS at 21:06

## 2024-09-24 RX ADMIN — KETOROLAC TROMETHAMINE 15 MG: 30 INJECTION, SOLUTION INTRAMUSCULAR; INTRAVENOUS at 21:02

## 2024-09-24 RX ADMIN — ACETAMINOPHEN 650 MG: 325 TABLET ORAL at 20:58

## 2024-09-25 LAB
ATRIAL RATE: 67 BPM
P AXIS: 10 DEGREES
PR INTERVAL: 144 MS
QRS AXIS: 45 DEGREES
QRSD INTERVAL: 78 MS
QT INTERVAL: 376 MS
QTC INTERVAL: 397 MS
T WAVE AXIS: 16 DEGREES
VENTRICULAR RATE: 67 BPM

## 2024-09-25 PROCEDURE — 93010 ELECTROCARDIOGRAM REPORT: CPT

## 2024-09-25 NOTE — DISCHARGE INSTRUCTIONS
You were evaluated in the emergency department for variety of symptoms including chest pain.  Your evaluation is showing no acute cause of your symptoms.  For your pain, continue to take Tylenol and Motrin at home, and apply heating pads to the effected muscular areas.  Please follow-up with cardiology for your chest pain.  A referral has been written for you to do so.  Return to the emergency department if your symptoms get severely worse or associate with new, concerning symptoms such as shortness of breath, fevers, or severely worsening chest pain.  
with male partner/Private car

## 2024-09-25 NOTE — ED ATTENDING ATTESTATION
"9/24/2024  I, Montse Sena DO, saw and evaluated the patient. I have discussed the patient with the resident/non-physician practitioner and agree with the resident's/non-physician practitioner's findings, Plan of Care, and MDM as documented in the resident's/non-physician practitioner's note, except where noted. All available labs and Radiology studies were reviewed.  I was present for key portions of any procedure(s) performed by the resident/non-physician practitioner and I was immediately available to provide assistance.       At this point I agree with the current assessment done in the Emergency Department.  I have conducted an independent evaluation of this patient a history and physical is as follows:    35 yo M h/o HTN, recently diagnosed psoriatic arthritis/started on methotrexate; presenting for several sx.   Pt reports chest discomfort/DAVIES, L arm/\"bicep\" pain that has burning pain that radiates down L forearm to his hand.     Denies abdominal pain, N/V/D/C, calf pain/swelling, edema    MDM: 35 yo M with CP/arm pain- EKG to r/o STEMI/dysrhythmia/abn intervals/ischemic changes, troponin to eval for ischemia, CBC to assess for leukocytosis/anemia, CMP to assess for elevated LFTs/JAMES/electrolyte derangements, CXR to r/o PTX/PNA/effusion/CHF     ED Course         Critical Care Time  Procedures      "

## 2024-09-25 NOTE — ED PROVIDER NOTES
1. Chest pain      ED Disposition       ED Disposition   Discharge    Condition   Stable    Date/Time   Tue Sep 24, 2024  9:53 PM    Comment   Mainsh Johnston discharge to home/self care.                   Assessment & Plan       Medical Decision Making  - Given patient's concerns, will do a cardiac workup.   - Will do an EKG for arrythmia, strain; troponin for same as per protocol for evaluation of ACS.   - CBC for anemia; CMP for kidney function and electrolytes.   - Will check CXR for pneumonia, PTX, fluid overload  - Will do CK to evaluate possible rhabdomyolysis  HEART score:  History 0=Slightly or non-suspicious  ECG 0=Normal  Age 0= < 45 years  Risk Factors 1= 1 or 2 risk factors  Troponin 0= Less than or equal to 12 ng/L  Total 1  - Disposition per workup.     Past Medical History:  No date: Anxiety  No date: Chronic pain  No date: GERD (gastroesophageal reflux disease)  No date: Hypertension  No date: Manic depressive disorder (HCC)      Amount and/or Complexity of Data Reviewed  Labs: ordered.  Radiology: ordered.    Risk  OTC drugs.  Prescription drug management.                ED Course as of 09/24/24 2208   Tue Sep 24, 2024   2052 Procedure Note: EKG  Date/Time: 09/24/24 8:53 PM   Interpreted by: Dmitriy Logan  Indications / Diagnosis: CP  ECG reviewed by me, the ED Provider: yes   The EKG demonstrates: normal EKG, normal sinus rhythm  Rhythm: normal sinus  Intervals: normal intervals  Axis: normal axis  QRS/Blocks: normal QRS  ST Changes: No acute ST Changes, no STD/JUMA.         Medications   sodium chloride (PF) 0.9 % injection 3 mL (has no administration in time range)   multi-electrolyte (ISOLYTE-S PH 7.4) bolus 1,000 mL (1,000 mL Intravenous New Bag 9/24/24 2106)   ketorolac (TORADOL) injection 15 mg (15 mg Intravenous Given 9/24/24 2102)   acetaminophen (TYLENOL) tablet 650 mg (650 mg Oral Given 9/24/24 2058)       History of Present Illness       Patient is a 34M w/ PMH HTN, MDD, chronic  chest/lower back pain, ?psoriatic arthritis recently astarted on methotrexate p/w arm pain began two hours ago, L, starts at bicep and radiates down to hand, started at rest, positional, worse with extension.  Patient is reports the symptoms were preceded by weeks of intermittent muscular cramping in other parts of his body.    ROS+ for SOB w/ exertion, going on for weeks, not changed. Associated with CP.         Review of Systems   All other systems reviewed and are negative.          Objective     ED Triage Vitals [09/24/24 2010]   Temperature Pulse Blood Pressure Respirations SpO2 Patient Position - Orthostatic VS   99.3 °F (37.4 °C) 77 133/84 18 94 % --      Temp Source Heart Rate Source BP Location FiO2 (%) Pain Score    Oral -- Right arm -- --        Physical Exam  Vitals and nursing note reviewed.   Constitutional:       General: He is not in acute distress.     Appearance: He is well-developed.   HENT:      Head: Normocephalic and atraumatic.   Eyes:      Conjunctiva/sclera: Conjunctivae normal.   Cardiovascular:      Rate and Rhythm: Normal rate and regular rhythm.      Heart sounds: No murmur heard.  Pulmonary:      Effort: Pulmonary effort is normal. No respiratory distress.      Breath sounds: Normal breath sounds.   Abdominal:      Palpations: Abdomen is soft.      Tenderness: There is no abdominal tenderness.   Musculoskeletal:         General: No swelling.      Cervical back: Neck supple.   Skin:     General: Skin is warm and dry.      Capillary Refill: Capillary refill takes less than 2 seconds.   Neurological:      Mental Status: He is alert.   Psychiatric:         Mood and Affect: Mood normal.         Labs Reviewed   HS TROPONIN I 0HR - Normal       Result Value    hs TnI 0hr <2     CK - Normal    Total CK 77     CBC AND DIFFERENTIAL    WBC 8.90      RBC 4.71      Hemoglobin 13.9      Hematocrit 41.5      MCV 88      MCH 29.5      MCHC 33.5      RDW 12.1      MPV 10.3      Platelets 300      nRBC 0       Segmented % 54      Immature Grans % 0      Lymphocytes % 38      Monocytes % 6      Eosinophils Relative 1      Basophils Relative 1      Absolute Neutrophils 4.88      Absolute Immature Grans 0.02      Absolute Lymphocytes 3.41      Absolute Monocytes 0.49      Eosinophils Absolute 0.05      Basophils Absolute 0.05     COMPREHENSIVE METABOLIC PANEL    Sodium 140      Potassium 3.8      Chloride 106      CO2 27      ANION GAP 7      BUN 10      Creatinine 0.95      Glucose 109      Calcium 9.2      AST 15      ALT 16      Alkaline Phosphatase 84      Total Protein 7.1      Albumin 4.3      Total Bilirubin 0.22      eGFR 104      Narrative:     National Kidney Disease Foundation guidelines for Chronic Kidney Disease (CKD):     Stage 1 with normal or high GFR (GFR > 90 mL/min/1.73 square meters)    Stage 2 Mild CKD (GFR = 60-89 mL/min/1.73 square meters)    Stage 3A Moderate CKD (GFR = 45-59 mL/min/1.73 square meters)    Stage 3B Moderate CKD (GFR = 30-44 mL/min/1.73 square meters)    Stage 4 Severe CKD (GFR = 15-29 mL/min/1.73 square meters)    Stage 5 End Stage CKD (GFR <15 mL/min/1.73 square meters)  Note: GFR calculation is accurate only with a steady state creatinine   HS TROPONIN I 2HR     X-ray chest 1 view portable   Final Interpretation by Deon Wilkes MD (09/24 2146)      No acute cardiopulmonary disease.            Workstation performed: [id number]             Procedures    ED Medication and Procedure Management   Prior to Admission Medications   Prescriptions Last Dose Informant Patient Reported? Taking?   Diclofenac Sodium (VOLTAREN) 1 %   Yes No   Sig: APPLY 4GM TO LOWER EXTREMITIES TOPICALLY THREE TIMES A DAY FOR PAIN AND INFLAMMATION **USE DOSING CARD** (DO NOT EXCEED 16 GRAMS DAILY TO ANY AFFECTED JOINT OF THE LOWER EXTREMITIES. DO NOT EXCEED 8 GRAMS DAILY TO ANY AFFECTED JOINT OF THE UPPER EXTREMITIES. DO NOT EXCEED A TOTAL DOSE OF 32 GRAMS DAILY OVER ALL JOINTS)   acetaminophen  (TYLENOL) 500 mg tablet   No No   Sig: Take 1 tablet (500 mg total) by mouth every 6 (six) hours as needed for mild pain, moderate pain or headaches   bismuth subsalicylate (PEPTO BISMOL) 262 MG chewable tablet   No No   Sig: Chew 1 tablet (262 mg total) 4 (four) times a day (before meals and at bedtime)   celecoxib (CeleBREX) 100 mg capsule   Yes No   Sig: Take 100 mg by mouth daily   divalproex sodium (DEPAKOTE ER) 500 mg 24 hr tablet   Yes No   Si,000 mg   lidocaine (LIDODERM) 5 %   Yes No   Sig: APPLY 2 PATCH TOPICALLY DAILY FOR PAIN (REMOVE PATCH AFTER 12 HOURS; 12 HOURS ON AND 12 HOURS OFF)   omeprazole (PriLOSEC) 20 mg delayed release capsule   Yes No   Sig: TAKE 1 CAPSULE BY MOUTH EVERY DAY FOR STOMACH OR REFLUX   omeprazole (PriLOSEC) 20 mg delayed release capsule   No No   Sig: Take 1 capsule (20 mg total) by mouth every 12 (twelve) hours for 14 days   sucralfate (CARAFATE) 1 g/10 mL suspension   No No   Sig: Take 10 mL (1 g total) by mouth 2 (two) times a day as needed (abd pain/ throat pain) for up to 2 days      Facility-Administered Medications: None     Patient's Medications   Discharge Prescriptions    No medications on file          Dmitriy Logan MD  24 1995

## 2024-09-30 ENCOUNTER — HOSPITAL ENCOUNTER (EMERGENCY)
Facility: HOSPITAL | Age: 34
Discharge: HOME | End: 2024-09-30
Attending: EMERGENCY MEDICINE
Payer: COMMERCIAL

## 2024-09-30 VITALS
OXYGEN SATURATION: 97 % | TEMPERATURE: 98.4 F | BODY MASS INDEX: 32.3 KG/M2 | HEART RATE: 69 BPM | SYSTOLIC BLOOD PRESSURE: 153 MMHG | DIASTOLIC BLOOD PRESSURE: 106 MMHG | RESPIRATION RATE: 18 BRPM | WEIGHT: 225.09 LBS

## 2024-09-30 DIAGNOSIS — R07.9 CHEST PAIN: Primary | ICD-10-CM

## 2024-09-30 LAB
ALBUMIN SERPL BCG-MCNC: 4.6 G/DL (ref 3.5–5)
ALP SERPL-CCNC: 79 U/L (ref 34–104)
ALT SERPL W P-5'-P-CCNC: 21 U/L (ref 7–52)
ANION GAP SERPL CALCULATED.3IONS-SCNC: 6 MMOL/L (ref 4–13)
AST SERPL W P-5'-P-CCNC: 25 U/L (ref 13–39)
ATRIAL RATE: 64 BPM
BASOPHILS # BLD AUTO: 0.04 THOUSANDS/ÂΜL (ref 0–0.1)
BASOPHILS NFR BLD AUTO: 1 % (ref 0–1)
BILIRUB DIRECT SERPL-MCNC: 0.1 MG/DL (ref 0–0.2)
BILIRUB SERPL-MCNC: 0.45 MG/DL (ref 0.2–1)
BUN SERPL-MCNC: 12 MG/DL (ref 5–25)
CALCIUM SERPL-MCNC: 9.8 MG/DL (ref 8.4–10.2)
CARDIAC TROPONIN I PNL SERPL HS: <2 NG/L
CHLORIDE SERPL-SCNC: 104 MMOL/L (ref 96–108)
CO2 SERPL-SCNC: 28 MMOL/L (ref 21–32)
CREAT SERPL-MCNC: 0.91 MG/DL (ref 0.6–1.3)
EOSINOPHIL # BLD AUTO: 0.01 THOUSAND/ÂΜL (ref 0–0.61)
EOSINOPHIL NFR BLD AUTO: 0 % (ref 0–6)
ERYTHROCYTE [DISTWIDTH] IN BLOOD BY AUTOMATED COUNT: 12.3 % (ref 11.6–15.1)
GFR SERPL CREATININE-BSD FRML MDRD: 109 ML/MIN/1.73SQ M
GLUCOSE SERPL-MCNC: 89 MG/DL (ref 65–140)
HCT VFR BLD AUTO: 43.6 % (ref 36.5–49.3)
HGB BLD-MCNC: 14.4 G/DL (ref 12–17)
IMM GRANULOCYTES # BLD AUTO: 0 THOUSAND/UL (ref 0–0.2)
IMM GRANULOCYTES NFR BLD AUTO: 0 % (ref 0–2)
LYMPHOCYTES # BLD AUTO: 2.52 THOUSANDS/ÂΜL (ref 0.6–4.47)
LYMPHOCYTES NFR BLD AUTO: 35 % (ref 14–44)
MCH RBC QN AUTO: 29.1 PG (ref 26.8–34.3)
MCHC RBC AUTO-ENTMCNC: 33 G/DL (ref 31.4–37.4)
MCV RBC AUTO: 88 FL (ref 82–98)
MONOCYTES # BLD AUTO: 0.43 THOUSAND/ÂΜL (ref 0.17–1.22)
MONOCYTES NFR BLD AUTO: 6 % (ref 4–12)
NEUTROPHILS # BLD AUTO: 4.2 THOUSANDS/ÂΜL (ref 1.85–7.62)
NEUTS SEG NFR BLD AUTO: 58 % (ref 43–75)
NRBC BLD AUTO-RTO: 0 /100 WBCS
P AXIS: 2 DEGREES
PLATELET # BLD AUTO: 299 THOUSANDS/UL (ref 149–390)
PMV BLD AUTO: 10.7 FL (ref 8.9–12.7)
POTASSIUM SERPL-SCNC: 4.6 MMOL/L (ref 3.5–5.3)
PR INTERVAL: 142 MS
PROT SERPL-MCNC: 8 G/DL (ref 6.4–8.4)
QRS AXIS: 32 DEGREES
QRSD INTERVAL: 82 MS
QT INTERVAL: 392 MS
QTC INTERVAL: 404 MS
RBC # BLD AUTO: 4.95 MILLION/UL (ref 3.88–5.62)
SODIUM SERPL-SCNC: 138 MMOL/L (ref 135–147)
T WAVE AXIS: 31 DEGREES
VENTRICULAR RATE: 64 BPM
WBC # BLD AUTO: 7.2 THOUSAND/UL (ref 4.31–10.16)

## 2024-09-30 PROCEDURE — 99285 EMERGENCY DEPT VISIT HI MDM: CPT

## 2024-09-30 PROCEDURE — 80048 BASIC METABOLIC PNL TOTAL CA: CPT | Performed by: EMERGENCY MEDICINE

## 2024-09-30 PROCEDURE — 99285 EMERGENCY DEPT VISIT HI MDM: CPT | Performed by: EMERGENCY MEDICINE

## 2024-09-30 PROCEDURE — 93010 ELECTROCARDIOGRAM REPORT: CPT | Performed by: STUDENT IN AN ORGANIZED HEALTH CARE EDUCATION/TRAINING PROGRAM

## 2024-09-30 PROCEDURE — 96361 HYDRATE IV INFUSION ADD-ON: CPT

## 2024-09-30 PROCEDURE — 93005 ELECTROCARDIOGRAM TRACING: CPT

## 2024-09-30 PROCEDURE — 85025 COMPLETE CBC W/AUTO DIFF WBC: CPT | Performed by: EMERGENCY MEDICINE

## 2024-09-30 PROCEDURE — 80076 HEPATIC FUNCTION PANEL: CPT | Performed by: EMERGENCY MEDICINE

## 2024-09-30 PROCEDURE — 96374 THER/PROPH/DIAG INJ IV PUSH: CPT

## 2024-09-30 PROCEDURE — 84484 ASSAY OF TROPONIN QUANT: CPT | Performed by: EMERGENCY MEDICINE

## 2024-09-30 RX ORDER — KETOROLAC TROMETHAMINE 30 MG/ML
15 INJECTION, SOLUTION INTRAMUSCULAR; INTRAVENOUS ONCE
Status: COMPLETED | OUTPATIENT
Start: 2024-09-30 | End: 2024-09-30

## 2024-09-30 RX ORDER — NAPROXEN 500 MG/1
500 TABLET ORAL 2 TIMES DAILY WITH MEALS
Qty: 20 TABLET | Refills: 0 | Status: SHIPPED | OUTPATIENT
Start: 2024-09-30 | End: 2024-10-10

## 2024-09-30 RX ORDER — METHOCARBAMOL 500 MG/1
500 TABLET, FILM COATED ORAL 2 TIMES DAILY
Qty: 20 TABLET | Refills: 0 | Status: SHIPPED | OUTPATIENT
Start: 2024-09-30

## 2024-09-30 RX ADMIN — SODIUM CHLORIDE 1000 ML: 0.9 INJECTION, SOLUTION INTRAVENOUS at 13:23

## 2024-09-30 RX ADMIN — KETOROLAC TROMETHAMINE 15 MG: 30 INJECTION, SOLUTION INTRAMUSCULAR; INTRAVENOUS at 13:25

## 2024-09-30 NOTE — DISCHARGE INSTRUCTIONS
Your labs and ECG were all reassuring today, there is no sign of damage to the heart. You can follow back up with your doctors for continuing evaluation and management of this.    Take the naprosyn twice daily for the next 10 days.    Use the Robaxin as needed for muscle spasm.    Use an electric heating pad over your sore muscles, 4-5 times daily, 20 minutes each time.    Make sure to drink plenty of fluids.

## 2024-09-30 NOTE — ED PROVIDER NOTES
Final diagnoses:   Chest pain     ED Disposition       ED Disposition   Discharge    Condition   Stable    Date/Time   Mon Sep 30, 2024  2:30 PM    Comment   Manish Tranalgo discharge to home/self care.                   Assessment & Plan       Medical Decision Making  1. Chest pain - Patient with history of psoriatic arthritis, chest, back and shoulder pain that is likely MSK. Will order ECG and troponin to rule out acute MI, CBC for leukocytosis, metabolic panel for electrolyte abnormalities and dehydration, Will give IV fluids and NSAIDs.     Problems Addressed:  Chest pain: acute illness or injury    Amount and/or Complexity of Data Reviewed  External Data Reviewed: ECG and notes.  Labs: ordered.  ECG/medicine tests: ordered and independent interpretation performed. Decision-making details documented in ED Course.    Risk  Prescription drug management.        ED Course as of 09/30/24 1838   Mon Sep 30, 2024   1223 ECG evaluated by myself, interpretation included in procedure section of note.        Medications   sodium chloride 0.9 % bolus 1,000 mL (0 mL Intravenous Stopped 9/30/24 1447)   ketorolac (TORADOL) injection 15 mg (15 mg Intravenous Given 9/30/24 1325)       ED Risk Strat Scores   HEART Risk Score      Flowsheet Row Most Recent Value   Heart Score Risk Calculator    History 0 Filed at: 09/30/2024 1428   ECG 0 Filed at: 09/30/2024 1428   Age 0 Filed at: 09/30/2024 1428   Risk Factors 1 Filed at: 09/30/2024 1428   Troponin 0 Filed at: 09/30/2024 1428   HEART Score 1 Filed at: 09/30/2024 1428                                                   History of Present Illness       Chief Complaint   Patient presents with    Chest Pain     Pt reports L sided chest pain for a few days. Seen already and cleared. States has persisted and told by [facility] to come [facility] for eval.        Past Medical History:   Diagnosis Date    Anxiety     Chronic pain     GERD (gastroesophageal reflux disease)     Hypertension     Manic  depressive disorder (HCC)       Past Surgical History:   Procedure Laterality Date    APPENDECTOMY LAPAROSCOPIC N/A 10/29/2021    Procedure: APPENDECTOMY LAPAROSCOPIC;  Surgeon: Glenis Molina MD;  Location: [facility];  Service: General    EGD      KNEE SURGERY        Family History   Problem Relation Age of Onset    Hypertension Mother     Diabetes Father       Social History     Tobacco Use    Smoking status: Never    Smokeless tobacco: Never   Vaping Use    Vaping status: Some Days    Substances: Flavoring   Substance Use Topics    Alcohol use: No    Drug use: No      E-Cigarette/Vaping    E-Cigarette Use Current Some Day User       E-Cigarette/Vaping Substances    Flavoring Yes       I have reviewed and agree with the history as documented.     35 YO male presents with Left sided chest, neck, shoulder and back pain for the last week. Patient states this has been constant, he does have radiation of discomfort down the Left arm and the Left leg. He has been taking ibuprofen for this without significant relief. Patient was evaluated previously for this. [facility], was evaluated there today and sent back over. Patient states they wanted to make sure his heart was ok prior to starting him on a course of steroids. Patient denies changes in symptoms since he was last evaluated. He denies known cardiac issues. Pt denies SOB/F/C/N/V/D/C, no dysuria, burning on urination or blood in urine.       History provided by:  Patient   used: No        Review of Systems   Constitutional:  Negative for fever.   HENT:  Negative for dental problem.    Eyes:  Negative for visual disturbance.   Respiratory:  Negative for shortness of breath.    Cardiovascular:  Positive for chest pain.   Gastrointestinal:  Negative for abdominal pain, nausea and vomiting.   Genitourinary:  Negative for dysuria and frequency.   Musculoskeletal:  Positive for arthralgias and back pain. Negative  for neck pain and neck stiffness.   Skin:  Negative for rash.   Neurological:  Negative for dizziness, weakness and light-headedness.   Psychiatric/Behavioral:  Negative for agitation, behavioral problems and confusion.    All other systems reviewed and are negative.          Objective       ED Triage Vitals   Temperature Pulse Blood Pressure Respirations SpO2 Patient Position - Orthostatic VS   09/30/24 1214 09/30/24 1214 09/30/24 1214 09/30/24 1214 09/30/24 1214 09/30/24 1439   98.4 °F (36.9 °C) 79 142/95 16 100 % Lying      Temp Source Heart Rate Source BP Location FiO2 (%) Pain Score    09/30/24 1214 09/30/24 1214 09/30/24 1439 -- 09/30/24 1237    Oral Monitor Right arm  9      Vitals      Date and Time Temp Pulse SpO2 Resp BP Pain Score FACES Pain Rating User   09/30/24 1455 -- 69 -- 18 -- -- -- DEH   09/30/24 1439 -- 69 97 % 18  153/106  9 -- EG   09/30/24 1325 -- -- -- -- -- 9 -- EG   09/30/24 1237 -- -- -- -- -- 9 -- EG   09/30/24 1214 98.4 °F (36.9 °C) 79 100 % 16 142/95 -- -- JL            Physical Exam  Vitals and nursing note reviewed.   Constitutional:       Appearance: He is well-developed.   HENT:      Head: Normocephalic and atraumatic.   Eyes:      Extraocular Movements: Extraocular movements intact.   Cardiovascular:      Rate and Rhythm: Normal rate and regular rhythm.      Pulses: Normal pulses.      Heart sounds: Normal heart sounds.   Pulmonary:      Effort: Pulmonary effort is normal.      Breath sounds: Normal breath sounds.   Chest:      Chest wall: Tenderness (Palpation over the Left chest wall reproduces discomfort) present.   Abdominal:      General: There is no distension.   Musculoskeletal:         General: Normal range of motion.      Cervical back: Normal range of motion.   Skin:     Findings: No rash.   Neurological:      Mental Status: He is alert and oriented to person, place, and time.   Psychiatric:         Behavior: Behavior normal.         Results Reviewed       Procedure  Component Value Units Date/Time    HS Troponin 0hr (reflex protocol) [[id number]]  (Normal) Resulted: 09/30/24 1426    Lab Status: Final result Specimen: Blood Updated: 09/30/24 1426     hs TnI 0hr <2 ng/L     Basic metabolic panel [[id number]] Resulted: 09/30/24 1426    Lab Status: Final result Specimen: Blood Updated: 09/30/24 1426     Sodium 138 mmol/L      Potassium 4.6 mmol/L      Chloride 104 mmol/L      CO2 28 mmol/L      ANION GAP 6 mmol/L      BUN 12 mg/dL      Creatinine 0.91 mg/dL      Glucose 89 mg/dL      Calcium 9.8 mg/dL      eGFR 109 ml/min/1.73sq m     Narrative:      National Kidney Disease Foundation guidelines for Chronic Kidney Disease (CKD):     Stage 1 with normal or high GFR (GFR > 90 mL/min/1.73 square meters)    Stage 2 Mild CKD (GFR = 60-89 mL/min/1.73 square meters)    Stage 3A Moderate CKD (GFR = 45-59 mL/min/1.73 square meters)    Stage 3B Moderate CKD (GFR = 30-44 mL/min/1.73 square meters)    Stage 4 Severe CKD (GFR = 15-29 mL/min/1.73 square meters)    Stage 5 End Stage CKD (GFR <15 mL/min/1.73 square meters)  Note: GFR calculation is accurate only with a steady state creatinine    Hepatic function panel [[id number]]  (Normal) Resulted: 09/30/24 1426    Lab Status: Final result Specimen: Blood Updated: 09/30/24 1426     Total Bilirubin 0.45 mg/dL      Bilirubin, Direct 0.10 mg/dL      Alkaline Phosphatase 79 U/L      AST 25 U/L      ALT 21 U/L      Total Protein 8.0 g/dL      Albumin 4.6 g/dL     CBC and differential [[id number]] Resulted: 09/30/24 1412    Lab Status: Final result Specimen: Blood Updated: 09/30/24 1412     WBC 7.20 Thousand/uL      RBC 4.95 Million/uL      Hemoglobin 14.4 g/dL      Hematocrit 43.6 %      MCV 88 fL      MCH 29.1 pg      MCHC 33.0 g/dL      RDW 12.3 %      MPV 10.7 fL      Platelets 299 Thousands/uL      nRBC 0 /100 WBCs      Segmented % 58 %      Immature Grans % 0 %      Lymphocytes % 35 %      Monocytes % 6 %      Eosinophils Relative 0 %      Basophils  Relative 1 %      Absolute Neutrophils 4.20 Thousands/µL      Absolute Immature Grans 0.00 Thousand/uL      Absolute Lymphocytes 2.52 Thousands/µL      Absolute Monocytes 0.43 Thousand/µL      Eosinophils Absolute 0.01 Thousand/µL      Basophils Absolute 0.04 Thousands/µL     Narrative:      This is an appended report.  These results have been appended to a previously verified report.            No orders to display       ECG 12 Lead Documentation Only    Date/Time: 9/30/2024 12:24 PM    Performed by: Edwin Farmer MD  Authorized by: Edwin Farmer MD    ECG reviewed by me, the ED Provider: yes    Patient location:  ED  Previous ECG:     Previous ECG:  Compared to current    Comparison ECG info:  9/24/2024    Similarity:  No change  Interpretation:     Interpretation: normal    Rate:     ECG rate:  64    ECG rate assessment: normal    Rhythm:     Rhythm: sinus rhythm    QRS:     QRS axis:  Normal    QRS intervals:  Normal  Conduction:     Conduction: normal    ST segments:     ST segments:  Normal  T waves:     T waves: normal        ED Medication and Procedure Management   Prior to Admission Medications   Prescriptions Last Dose Informant Patient Reported? Taking?   Diclofenac Sodium (VOLTAREN) 1 %   Yes No   Sig: APPLY 4GM TO LOWER EXTREMITIES TOPICALLY THREE TIMES A DAY FOR PAIN AND INFLAMMATION **USE DOSING CARD** (DO NOT EXCEED 16 GRAMS DAILY TO ANY AFFECTED JOINT OF THE LOWER EXTREMITIES. DO NOT EXCEED 8 GRAMS DAILY TO ANY AFFECTED JOINT OF THE UPPER EXTREMITIES. DO NOT EXCEED A TOTAL DOSE OF 32 GRAMS DAILY OVER ALL JOINTS)   acetaminophen (TYLENOL) 500 mg tablet   No No   Sig: Take 1 tablet (500 mg total) by mouth every 6 (six) hours as needed for mild pain, moderate pain or headaches   bismuth subsalicylate (PEPTO BISMOL) 262 MG chewable tablet   No No   Sig: Chew 1 tablet (262 mg total) 4 (four) times a day (before meals and at bedtime)   celecoxib (CeleBREX) 100 mg capsule   Yes No   Sig: Take 100  mg by mouth daily   divalproex sodium (DEPAKOTE ER) 500 mg 24 hr tablet   Yes No   Si,000 mg   lidocaine (LIDODERM) 5 %   Yes No   Sig: APPLY 2 PATCH TOPICALLY DAILY FOR PAIN (REMOVE PATCH AFTER 12 HOURS; 12 HOURS ON AND 12 HOURS OFF)   omeprazole (PriLOSEC) 20 mg delayed release capsule   Yes No   Sig: TAKE 1 CAPSULE BY MOUTH EVERY DAY FOR STOMACH OR REFLUX   omeprazole (PriLOSEC) 20 mg delayed release capsule   No No   Sig: Take 1 capsule (20 mg total) by mouth every 12 (twelve) hours for 14 days   sucralfate (CARAFATE) 1 g/10 mL suspension   No No   Sig: Take 10 mL (1 g total) by mouth 2 (two) times a day as needed (abd pain/ throat pain) for up to 2 days      Facility-Administered Medications: None     Discharge Medication List as of 2024  2:30 PM        START taking these medications    Details   methocarbamol (ROBAXIN) 500 mg tablet Take 1 tablet (500 mg total) by mouth 2 (two) times a day, Starting Mon 2024, Normal      naproxen (NAPROSYN) 500 mg tablet Take 1 tablet (500 mg total) by mouth 2 (two) times a day with meals for 10 days, Starting Mon 2024, Until Thu 10/10/2024, Normal           CONTINUE these medications which have NOT CHANGED    Details   acetaminophen (TYLENOL) 500 mg tablet Take 1 tablet (500 mg total) by mouth every 6 (six) hours as needed for mild pain, moderate pain or headaches, Starting Mon 2021, Normal      bismuth subsalicylate (PEPTO BISMOL) 262 MG chewable tablet Chew 1 tablet (262 mg total) 4 (four) times a day (before meals and at bedtime), Starting Wed 2024, Normal      celecoxib (CeleBREX) 100 mg capsule Take 100 mg by mouth daily, Historical Med      Diclofenac Sodium (VOLTAREN) 1 % APPLY 4GM TO LOWER EXTREMITIES TOPICALLY THREE TIMES A DAY FOR PAIN AND INFLAMMATION **USE DOSING CARD** (DO NOT EXCEED 16 GRAMS DAILY TO ANY AFFECTED JOINT OF THE LOWER EXTREMITIES. DO NOT EXCEED 8 GRAMS DAILY TO ANY AFFECTED JOINT OF THE UPPER EXTREMIT IES. DO NOT EXCEED A  TOTAL DOSE OF 32 GRAMS DAILY OVER ALL JOINTS), Historical Med      divalproex sodium (DEPAKOTE ER) 500 mg 24 hr tablet 1,000 mg, Starting Wed 8/16/2023, Historical Med      lidocaine (LIDODERM) 5 % APPLY 2 PATCH TOPICALLY DAILY FOR PAIN (REMOVE PATCH AFTER 12 HOURS; 12 HOURS ON AND 12 HOURS OFF), Historical Med      omeprazole (PriLOSEC) 20 mg delayed release capsule TAKE 1 CAPSULE BY MOUTH EVERY DAY FOR STOMACH OR REFLUX, Historical Med      sucralfate (CARAFATE) 1 g/10 mL suspension Take 10 mL (1 g total) by mouth 2 (two) times a day as needed (abd pain/ throat pain) for up to 2 days, Starting Mon 5/27/2024, Until Wed 5/29/2024 at 2359, Normal           No discharge procedures on file.  ED SEPSIS DOCUMENTATION   Time reflects when diagnosis was documented in both MDM as applicable and the Disposition within this note       Time User Action Codes Description Comment    9/30/2024  2:29 PM Edwin Farmer Add [R07.9] Chest pain                  Edwin Farmer MD  09/30/24 4569

## 2024-10-09 ENCOUNTER — OFFICE VISIT (OUTPATIENT)
Dept: GASTROENTEROLOGY | Facility: MEDICAL CENTER | Age: 34
End: 2024-10-09
Payer: COMMERCIAL

## 2024-10-09 VITALS
HEIGHT: 71 IN | HEART RATE: 97 BPM | SYSTOLIC BLOOD PRESSURE: 138 MMHG | DIASTOLIC BLOOD PRESSURE: 95 MMHG | WEIGHT: 225 LBS | BODY MASS INDEX: 31.5 KG/M2 | TEMPERATURE: 97.8 F | OXYGEN SATURATION: 98 %

## 2024-10-09 DIAGNOSIS — R13.19 ESOPHAGEAL DYSPHAGIA: ICD-10-CM

## 2024-10-09 DIAGNOSIS — A04.8 H. PYLORI INFECTION: Primary | ICD-10-CM

## 2024-10-09 DIAGNOSIS — K21.9 GASTROESOPHAGEAL REFLUX DISEASE WITHOUT ESOPHAGITIS: ICD-10-CM

## 2024-10-09 PROCEDURE — 99213 OFFICE O/P EST LOW 20 MIN: CPT | Performed by: NURSE PRACTITIONER

## 2024-10-09 RX ORDER — METHOTREXATE 2.5 MG/1
10 TABLET ORAL
COMMUNITY
Start: 2024-09-11

## 2024-10-09 RX ORDER — NORTRIPTYLINE HCL 10 MG
20 CAPSULE ORAL DAILY
COMMUNITY
Start: 2024-08-16

## 2024-10-09 NOTE — PROGRESS NOTES
[facility] - Outpatient Follow-up Note  Manish Johnston 34 y.o. male MRN: [id number]  Encounter: [id number]          ASSESSMENT AND PLAN:      1.  Change in bowel habits    BMs are brown and formed 1-2 times per day.  He is drinking increased water and has increased his fiber supplement.  Denies any melena or hematochezia.    -Continue fiber supplement daily  -Continue water intake to 64 ounces daily      2.  Dysphagia  3.  Nausea  4.  GERD  5.  H. pylori infection    EGD was normal but biopsies did reveal some duodenal mucosa with patchy increased intraepithelial lymphocytes.  Biopsies were positive for H. pylori.  Patient was treated with quadruple therapy.  He completed therapy but did not do stool for H. pylori to assess for eradication.  He continues with intermittent epigastric pain postprandially.  He is off his PPI.  Indeterminant dysphagia with solids in the upper esophagus.  No coughing or choking.  Advances with liquids.  Never got esophagram that was recommended.    -Stool for H. Pylori  -Esophagram  -Will contact patient with results  -Follow-up in office in 2 to 3 months or sooner if needed  ______________________________________________________________________    SUBJECTIVE: 34-year-old male here for follow-up.  He was last seen by myself 5/21/2024 for dysphagia, nausea, GERD and change in bowel habits.    BMs are brown and formed alternating with soft stools for many years.  Denies any melena or hematochezia.  Cannot pinpoint specific food triggers.  Reports he does not have a high-fiber diet nor does he drink many liquids during the day.  No family history of any colon cancers or polyps.  Never had a colonoscopy.  Will recommend fiber supplement daily and increase water intake.  If symptoms persist will consider further testing.    Interval history: EGD was normal but biopsies did reveal some duodenal mucosa with patchy increased intraepithelial lymphocytes.  Biopsies  were positive for H. pylori.  Patient was treated with quadruple therapy.  He completed therapy but did not do stool for H. pylori to assess for eradication.  He continues with intermittent epigastric pain postprandially.  He is off his PPI.  Indeterminant dysphagia with solids in the upper esophagus.  No coughing or choking.  Advances with liquids.  Recently given oral steroids for a week and received a steroid injection for joint pain.  Reports he is feeling much better with his joints.  History of psoriatic arthritis.    BMs are brown and formed 1-2 times per day.  He is drinking increased water and has increased his fiber supplement.  Denies any melena or hematochezia.    Labs 9/24-CMP normal, CBC normal.       Prior EGD/colonoscopy     EGD 5/24-normal exam.  Biopsies noted duodenal mucosa with patchy increased intraepithelial lymphocytes.  No villous blunting seen.  H. pylori chronic antral gastritis noted.    REVIEW OF SYSTEMS IS OTHERWISE NEGATIVE.  10 point review of systems negative other than per HPI    Historical Information   Past Medical History:   Diagnosis Date    Anxiety     Chronic pain     GERD (gastroesophageal reflux disease)     Hypertension     Manic depressive disorder (HCC)      Past Surgical History:   Procedure Laterality Date    APPENDECTOMY LAPAROSCOPIC N/A 10/29/2021    Procedure: APPENDECTOMY LAPAROSCOPIC;  Surgeon: Glenis Molina MD;  Location: [facility];  Service: General    EGD      KNEE SURGERY       Social History   Social History     Substance and Sexual Activity   Alcohol Use No     Social History     Substance and Sexual Activity   Drug Use No     Social History     Tobacco Use   Smoking Status Never   Smokeless Tobacco Never     Family History   Problem Relation Age of Onset    Hypertension Mother     Diabetes Father        Meds/Allergies       Current Outpatient Medications:     bismuth subsalicylate (PEPTO BISMOL) 262 MG chewable tablet    Diclofenac Sodium (VOLTAREN) 1  "%    divalproex sodium (DEPAKOTE ER) 500 mg 24 hr tablet    lidocaine (LIDODERM) 5 %    methotrexate 2.5 MG tablet    nortriptyline (PAMELOR) 10 mg capsule    omeprazole (PriLOSEC) 20 mg delayed release capsule    acetaminophen (TYLENOL) 500 mg tablet    celecoxib (CeleBREX) 100 mg capsule    methocarbamol (ROBAXIN) 500 mg tablet    naproxen (NAPROSYN) 500 mg tablet    omeprazole (PriLOSEC) 20 mg delayed release capsule    sucralfate (CARAFATE) 1 g/10 mL suspension    Allergies   Allergen Reactions    Gabapentin Fatigue    Trazodone Other (See Comments)           Objective     Blood pressure 138/95, pulse 97, temperature 97.8 °F (36.6 °C), height 5' 11\" (1.803 m), weight 102 kg (225 lb), SpO2 98%. Body mass index is 31.38 kg/m².      PHYSICAL EXAM:      General Appearance:   Alert, cooperative, no distress   HEENT:   Normocephalic, atraumatic, anicteric.     Neck:  Supple, symmetrical, trachea midline   Lungs:   Clear to auscultation bilaterally; no rales, rhonchi or wheezing; respirations unlabored    Heart::   Regular rate and rhythm; no murmur, rub, or gallop.   Abdomen:   Soft, non-tender, non-distended; normal bowel sounds; no masses, no organomegaly    Genitalia:   Deferred    Rectal:   Deferred    Extremities:  No cyanosis, clubbing or edema    Pulses:  2+ and symmetric    Skin:  No jaundice, rashes, or lesions    Lymph nodes:  No palpable cervical lymphadenopathy        Lab Results:   No visits with results within 1 Day(s) from this visit.   Latest known visit with results is:   Admission on 09/30/2024, Discharged on 09/30/2024   Component Date Value    Ventricular Rate 09/30/2024 64     Atrial Rate 09/30/2024 64     AZ Interval 09/30/2024 142     QRSD Interval 09/30/2024 82     QT Interval 09/30/2024 392     QTC Interval 09/30/2024 404     P Axis 09/30/2024 2     QRS [place] 09/30/2024 32     T Wave [place] 09/30/2024 31     WBC 09/30/2024 7.20     RBC 09/30/2024 4.95     Hemoglobin 09/30/2024 14.4     Hematocrit " 09/30/2024 43.6     MCV 09/30/2024 88     MCH 09/30/2024 29.1     MCHC 09/30/2024 33.0     RDW 09/30/2024 12.3     MPV 09/30/2024 10.7     Platelets 09/30/2024 299     nRBC 09/30/2024 0     Segmented % 09/30/2024 58     Immature Grans % 09/30/2024 0     Lymphocytes % 09/30/2024 35     Monocytes % 09/30/2024 6     Eosinophils Relative 09/30/2024 0     Basophils Relative 09/30/2024 1     Absolute Neutrophils 09/30/2024 4.20     Absolute Immature Grans 09/30/2024 0.00     Absolute Lymphocytes 09/30/2024 2.52     Absolute Monocytes 09/30/2024 0.43     Eosinophils Absolute 09/30/2024 0.01     Basophils Absolute 09/30/2024 0.04     Sodium 09/30/2024 138     Potassium 09/30/2024 4.6     Chloride 09/30/2024 104     CO2 09/30/2024 28     ANION GAP 09/30/2024 6     BUN 09/30/2024 12     Creatinine 09/30/2024 0.91     Glucose 09/30/2024 89     Calcium 09/30/2024 9.8     eGFR 09/30/2024 109     Total Bilirubin 09/30/2024 0.45     Bilirubin, Direct 09/30/2024 0.10     Alkaline Phosphatase 09/30/2024 79     AST 09/30/2024 25     ALT 09/30/2024 21     Total Protein 09/30/2024 8.0     Albumin 09/30/2024 4.6     hs TnI 0hr 09/30/2024 <2          Radiology Results:   X-ray chest 1 view portable    Result Date: 9/24/2024  Narrative: XR CHEST PORTABLE INDICATION: chest pain. COMPARISON: 5/26/2024 FINDINGS: Somewhat limited inspiration. Clear lungs. No pneumothorax or pleural effusion. Normal cardiomediastinal silhouette. Bones are unremarkable for age. Normal upper abdomen.     Impression: No acute cardiopulmonary disease. Workstation performed: [id number]

## 2024-10-15 ENCOUNTER — HOSPITAL ENCOUNTER (OUTPATIENT)
Dept: RADIOLOGY | Facility: HOSPITAL | Age: 34
Discharge: HOME | End: 2024-10-15
Payer: COMMERCIAL

## 2024-10-15 DIAGNOSIS — R13.19 ESOPHAGEAL DYSPHAGIA: ICD-10-CM

## 2024-10-15 PROCEDURE — 74220 X-RAY XM ESOPHAGUS 1CNTRST: CPT

## 2024-11-24 PROBLEM — R07.2 PRECORDIAL CHEST PAIN: Status: ACTIVE | Noted: 2024-11-24

## 2024-11-24 PROBLEM — E66.811 OBESITY, CLASS I, BMI 30-34.9: Status: ACTIVE | Noted: 2024-11-24

## 2024-11-24 NOTE — ASSESSMENT & PLAN NOTE
11/23/2023, 5/26/2024, 9/24/2024 and 9/30/2024 patient has been to the ED for chest pain.  On all of these occasions, EKGs have demonstrated no acute changes and were normal, troponins were normal.  She has been hypertensive on occasion.   [FreeTextEntry1] : Mr. GUSTAFSON is a pleasant 64-year-old male presenting to the office as a follow up status post T9 radiofrequency ablation of a T9 tumor percutaneous biopsy with a T9 kyphoplasty performed on 10/31/2024.  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 Patient had several months of progressive persistent thoracic pain that failed conservative management including a NUBAI of L4-5 on 6/5/24 with Dr. Vallejo. Given the findings of a T9 bony tumor as well as the persistent back pain, the risks, benefits and alternatives of the procedure were discussed.   TODAY: Postoperatively he has been doing well, preoperative midthoracic back pain has improved. He admits that he was hanging Francisco Javier lights recently and describes mild thoracolumbar myofascial type pain at this time.  Will be starting conservative management with physical therapy for this.  PATHOLOGY: Thoracic 9 biopsy - Plasma cell neoplasm, persistent/recurrent.  WOUND: x2 puncture sites well healed   PLAN: Proceed with radiation plan established with Dr. Mann  Return to our office in three months, sooner if needed  PHYSICAL EXAM: Constitutional: Well appearing, no distress HEENT: Normocephalic Atraumatic Psychiatric: Alert and oriented to all spheres, normal mood Pulmonary: No respiratory distress  Neurologic: CN II-XII intact Palpation: Mild discomfort to thoracolumbar paraspinal muscles with palpation  Strength: Full strength in all major muscle groups, no atrophy Sensation: Full sensation to light touch in all extremities Reflexes: 2+ patellar 2+ biceps  No Esquivel's No Clonus  ROM intact  SLR negative b/l Gait: Steady, walking w/o assistance.

## 2024-11-26 ENCOUNTER — TELEPHONE (OUTPATIENT)
Dept: CARDIOLOGY CLINIC | Facility: CLINIC | Age: 34
End: 2024-11-26

## 2024-11-26 ENCOUNTER — CONSULT (OUTPATIENT)
Dept: CARDIOLOGY CLINIC | Facility: CLINIC | Age: 34
End: 2024-11-26
Payer: COMMERCIAL

## 2024-11-26 VITALS
BODY MASS INDEX: 31.24 KG/M2 | WEIGHT: 224 LBS | HEART RATE: 63 BPM | SYSTOLIC BLOOD PRESSURE: 128 MMHG | DIASTOLIC BLOOD PRESSURE: 80 MMHG

## 2024-11-26 DIAGNOSIS — I10 PRIMARY HYPERTENSION: ICD-10-CM

## 2024-11-26 DIAGNOSIS — G47.33 OBSTRUCTIVE SLEEP APNEA: ICD-10-CM

## 2024-11-26 DIAGNOSIS — R07.2 PRECORDIAL CHEST PAIN: Primary | ICD-10-CM

## 2024-11-26 DIAGNOSIS — R07.9 CHEST PAIN: ICD-10-CM

## 2024-11-26 DIAGNOSIS — E66.811 OBESITY, CLASS I, BMI 30-34.9: ICD-10-CM

## 2024-11-26 PROCEDURE — 93000 ELECTROCARDIOGRAM COMPLETE: CPT | Performed by: INTERNAL MEDICINE

## 2024-11-26 PROCEDURE — 99244 OFF/OP CNSLTJ NEW/EST MOD 40: CPT | Performed by: INTERNAL MEDICINE

## 2024-11-26 RX ORDER — HYDROXYZINE HYDROCHLORIDE 10 MG/1
1 TABLET, FILM COATED ORAL 3 TIMES DAILY PRN
COMMUNITY
Start: 2024-11-12

## 2024-11-26 RX ORDER — ERGOCALCIFEROL 1.25 MG/1
CAPSULE, LIQUID FILLED ORAL
COMMUNITY
Start: 2024-11-12

## 2024-11-26 RX ORDER — GUAIFENESIN 600 MG/1
1200 TABLET, EXTENDED RELEASE ORAL 2 TIMES DAILY
COMMUNITY

## 2024-11-26 RX ORDER — TIRZEPATIDE 7.5 MG/.5ML
INJECTION, SOLUTION SUBCUTANEOUS
COMMUNITY
Start: 2024-11-12

## 2024-11-26 RX ORDER — METFORMIN HYDROCHLORIDE 500 MG/1
500 TABLET, EXTENDED RELEASE ORAL
COMMUNITY
Start: 2024-11-12

## 2024-11-26 RX ORDER — DESVENLAFAXINE 50 MG/1
50 TABLET, FILM COATED, EXTENDED RELEASE ORAL
COMMUNITY
Start: 2024-11-12

## 2024-11-26 RX ORDER — RISPERIDONE 0.5 MG/1
0.5 TABLET ORAL
COMMUNITY
Start: 2024-08-13

## 2024-11-26 RX ORDER — TRAMADOL HYDROCHLORIDE 50 MG/1
100 TABLET ORAL
COMMUNITY
Start: 2024-10-07 | End: 2024-11-26 | Stop reason: ALTCHOICE

## 2024-11-26 RX ORDER — AMLODIPINE BESYLATE 5 MG/1
5 TABLET ORAL
COMMUNITY
Start: 2024-10-01

## 2024-11-26 RX ORDER — PREGABALIN 50 MG/1
50 CAPSULE ORAL
COMMUNITY
Start: 2024-11-14

## 2024-11-26 RX ORDER — FLUTICASONE PROPIONATE 50 MCG
SPRAY, SUSPENSION (ML) NASAL
COMMUNITY
Start: 2024-07-19

## 2024-11-26 NOTE — TELEPHONE ENCOUNTER
Phone call to patient per Dr. Ham.  After visit, Dr. Billy decided he would like patient to obtain a fasting lipid panel.    Informed patient order is in system and can go to any [facility] facility and have drawn.  It is a fasting test, nothing to eat or drink after midnight.    Patient understood instructions

## 2024-11-26 NOTE — PROGRESS NOTES
[facility]  [address] [place] [address]  Phone#  [phone]   Fax#  [phone]  *-*-*-*-*-*-*-*-*-*-*-*-*-*-*-*-*-*-*-*-*-*-*-*-*-*-*-*-*-*-*-*-*-*-*-*-*-*-*-*-*-*-*-*-*-*-*-*-*-*-*-*-*-*                                              Cardiology Consultation       ENCOUNTER DATE: 24 12:46 PM  PATIENT NAME: Manish Johnston   : 1990    MRN: [id number]  AGE:34 y.o.      SEX: male  ENCOUNTER PROVIDER:Lopez Billy MD     PRIMARY CARE PHYSICIAN: Durga Ji MD    CARDIOLOGY SPECIALTY COMMENTS  Referred to cardiology by (ED) Montse Sena DO for chest pain    2019 sleep study was negative for sleep apnea    2023, 2024, 2024 and 2024 patient has been to the ED for chest pain.    ACTIVE PROBLEM LIST  Patient Active Problem List   Diagnosis    Chronic pain    Obstructive sleep apnea    Hypertension    GERD (gastroesophageal reflux disease)    Manic depressive disorder (HCC)    Acute appendicitis    H. pylori infection    Precordial chest pain    Obesity, Class I, BMI 30-34.9       ACTIVE DIAGNOSIS AND PLAN    1. Precordial chest pain  Assessment & Plan:  2023, 2024, 2024 and 2024 patient has been to the ED for chest pain.  On all of these occasions, EKGs have demonstrated no acute changes and were normal, troponins were normal.  She has been hypertensive on occasion.  Orders:  -     POCT ECG  -     Stress test only, exercise; Future; Expected date: 2024  2. Primary hypertension  Assessment & Plan:  2024 133/84  2024 153/106  10/9/2024 138/95    On no antihypertensive medications  Orders:  -     Echo complete w/ contrast if indicated; Future; Expected date: 2024  3. Obstructive sleep apnea  Assessment & Plan:  2019 sleep study was negative for sleep apnea  4. Obesity, Class I, BMI 30-34.9  Assessment & Plan:  BMI 31.4  5. Chest pain  -     Ambulatory Referral to Cardiology     HPI:    Patient  has substernal pressure type chest discomfort and becomes diaphoretic, nauseous and short of breath.  Initially patient stated that it mostly at rest and not with physical activity.  But later in our discussion, he admits that he does not do much activity because he gets this chest discomfort and if he goes for a long walk he will get this chest discomfort.      He is a lifetime non-smoker and has a family history of diabetes but not heart disease.    He has been treated for psoriatic arthritis with Adalmumab and also has recently been diagnosed with fibromyalgiaoratic arthritis and fibromyalga    He feels that he could perform a treadmill stress test.    DISCUSSION/PLAN:       Treadmill stress test  Echocardiogram for left ventricular function and wall motion, pericardial effusion or signs of pericarditis  Return in 2 months      Lab Studies:    Lab Results   Component Value Date    CHOLESTEROL 141 11/06/2018     Lab Results   Component Value Date    TRIG 73 11/06/2018     Lab Results   Component Value Date    HDL 57 11/06/2018     Lab Results   Component Value Date    LDLCALC 69 11/06/2018     Lab Results   Component Value Date    NTBNP 16 04/05/2021       Lab Results   Component Value Date    EGFR 109 09/30/2024    EGFR 104 09/24/2024    EGFR 85 05/31/2024    SODIUM 138 09/30/2024    SODIUM 140 09/24/2024    SODIUM 141 05/31/2024    K 4.6 09/30/2024    K 3.8 09/24/2024    K 3.8 05/31/2024     09/30/2024     09/24/2024     05/31/2024    CO2 28 09/30/2024    CO2 27 09/24/2024    CO2 28 05/31/2024    BUN 12 09/30/2024    BUN 10 09/24/2024    BUN 15 05/31/2024    CREATININE 0.91 09/30/2024    CREATININE 0.95 09/24/2024    CREATININE 1.12 05/31/2024     Lab Results   Component Value Date    WBC 7.20 09/30/2024    WBC 8.90 09/24/2024    WBC 7.49 05/31/2024    HGB 14.4 09/30/2024    HGB 13.9 09/24/2024    HGB 14.9 05/31/2024    HCT 43.6 09/30/2024    HCT 41.5 09/24/2024    HCT 43.0 05/31/2024    MCV 88  09/30/2024    MCV 88 09/24/2024    MCV 86 05/31/2024    MCH 29.1 09/30/2024    MCH 29.5 09/24/2024    MCH 29.9 05/31/2024    MCHC 33.0 09/30/2024    MCHC 33.5 09/24/2024    MCHC 34.7 05/31/2024     09/30/2024     09/24/2024     05/31/2024      Lab Results   Component Value Date    CALCIUM 9.8 09/30/2024    CALCIUM 9.2 09/24/2024    CALCIUM 10.5 (H) 05/31/2024    AST 25 09/30/2024    AST 15 09/24/2024    AST 17 05/31/2024    ALT 21 09/30/2024    ALT 16 09/24/2024    ALT 16 05/31/2024    ALKPHOS 79 09/30/2024    ALKPHOS 84 09/24/2024    ALKPHOS 85 05/31/2024    MG 2.1 05/26/2024    MG 2.1 10/15/2018     Lab Results   Component Value Date    TSH 1.11 06/03/2022     Lab Results   Component Value Date    TROPONINI <0.02 04/05/2021    TROPONINI <0.02 10/15/2018    TROPONINI <0.02 08/29/2018       Results for orders placed or performed in visit on 11/26/24   POCT ECG    Narrative    Normal sinus rhythm at a rate of 63 bpm.  Normal EKG.         Current Outpatient Medications:     Adalimumab-bwwd 40 MG/0.8ML SOAJ, Inject under the skin, Disp: , Rfl:     amLODIPine (NORVASC) 5 mg tablet, Take 5 mg by mouth, Disp: , Rfl:     desvenlafaxine succinate (PRISTIQ) 50 mg 24 hr tablet, Take 50 mg by mouth, Disp: , Rfl:     Diclofenac Sodium (VOLTAREN) 1 %, APPLY 4GM TO LOWER EXTREMITIES TOPICALLY THREE TIMES A DAY FOR PAIN AND INFLAMMATION **USE DOSING CARD** (DO NOT EXCEED 16 GRAMS DAILY TO ANY AFFECTED JOINT OF THE LOWER EXTREMITIES. DO NOT EXCEED 8 GRAMS DAILY TO ANY AFFECTED JOINT OF THE UPPER EXTREMITIES. DO NOT EXCEED A TOTAL DOSE OF 32 GRAMS DAILY OVER ALL JOINTS), Disp: , Rfl:     divalproex sodium (DEPAKOTE ER) 500 mg 24 hr tablet, 1,000 mg, Disp: , Rfl:     ergocalciferol (VITAMIN D2) 50,000 units, TAKE 1 CAPSULE BY MOUTH EVERY WEEK FOR A TOTAL OF 12 WEEKS, Disp: , Rfl:     fluticasone (FLONASE) 50 mcg/act nasal spray, into each nostril, Disp: , Rfl:     guaiFENesin (MUCINEX) 600 mg 12 hr tablet, Take  1,200 mg by mouth 2 (two) times a day, Disp: , Rfl:     hydrOXYzine HCL (ATARAX) 10 mg tablet, Take 1 tablet by mouth 3 (three) times a day as needed, Disp: , Rfl:     lidocaine (LIDODERM) 5 %, APPLY 2 PATCH TOPICALLY DAILY FOR PAIN (REMOVE PATCH AFTER 12 HOURS; 12 HOURS ON AND 12 HOURS OFF), Disp: , Rfl:     metFORMIN (GLUCOPHAGE-XR) 500 mg 24 hr tablet, Take 500 mg by mouth daily at bedtime, Disp: , Rfl:     nortriptyline (PAMELOR) 10 mg capsule, Take 20 mg by mouth daily, Disp: , Rfl:     pregabalin (LYRICA) 50 mg capsule, Take 50 mg by mouth, Disp: , Rfl:     risperiDONE (RisperDAL) 0.5 mg tablet, Take 0.5 mg by mouth, Disp: , Rfl:     Zepbound 7.5 MG/0.5ML auto-injector, INJECT 7.5MG/0.5ML SUBCUTANEOUSLY ONCE A WEEK, Disp: , Rfl:     bismuth subsalicylate (PEPTO BISMOL) 262 MG chewable tablet, Chew 1 tablet (262 mg total) 4 (four) times a day (before meals and at bedtime) (Patient not taking: Reported on 11/26/2024), Disp: 56 tablet, Rfl: 0  Allergies   Allergen Reactions    Gabapentin Fatigue    Trazodone Other (See Comments)       Past Medical History:   Diagnosis Date    Anxiety     Chronic pain     GERD (gastroesophageal reflux disease)     Hypertension     Manic depressive disorder (HCC)      Social History     Socioeconomic History    Marital status: Unknown     Spouse name: Not on file    Number of children: Not on file    Years of education: Not on file    Highest education level: Not on file   Occupational History    Not on file   Tobacco Use    Smoking status: Never    Smokeless tobacco: Never   Vaping Use    Vaping status: Some Days    Substances: Flavoring   Substance and Sexual Activity    Alcohol use: No    Drug use: No    Sexual activity: Not on file   Other Topics Concern    Not on file   Social History Narrative    Not on file     Social Drivers of Health     Financial Resource Strain: Not on file   Food Insecurity: Not on file   Transportation Needs: Not on file   Physical Activity: Not on  file   Stress: Not on file   Social Connections: Not on file   Intimate Partner Violence: Not on file   Housing Stability: Not on file      Family History   Problem Relation Age of Onset    Hypertension Mother     Diabetes Father      Past Surgical History:   Procedure Laterality Date    APPENDECTOMY LAPAROSCOPIC N/A 10/29/2021    Procedure: APPENDECTOMY LAPAROSCOPIC;  Surgeon: Glenis Molina MD;  Location: [facility];  Service: General    EGD      KNEE SURGERY         Review of Systems:  Review of Systems   Constitutional: Negative.  Negative for activity change.   HENT: Negative.     Eyes: Negative.    Respiratory:  Negative for cough, chest tightness, shortness of breath and wheezing.    Cardiovascular:  Negative for chest pain, palpitations and leg swelling.   Gastrointestinal: Negative.    Endocrine: Negative.    Musculoskeletal: Negative.  Negative for gait problem.   Skin: Negative.  Negative for color change.   Allergic/Immunologic: Negative.    Neurological: Negative.  Negative for dizziness, tremors, syncope, weakness, light-headedness and headaches.   Hematological: Negative.    Psychiatric/Behavioral: Negative.  Negative for agitation and confusion.        Physical Exam:  /80 (BP Location: Right arm, Patient Position: Sitting, Cuff Size: Large)   Pulse 63   Wt 102 kg (224 lb)   BMI 31.24 kg/m²     PREVIOUS WEIGHTS:   Wt Readings from Last 10 Encounters:   11/26/24 102 kg (224 lb)   10/09/24 102 kg (225 lb)   09/30/24 102 kg (225 lb 1.4 oz)   09/24/24 106 kg (234 lb 2.1 oz)   05/26/24 108 kg (238 lb 12.1 oz)   05/21/24 110 kg (241 lb 9.6 oz)   10/10/23 108 kg (237 lb 12.8 oz)   08/29/23 111 kg (244 lb)   08/25/23 111 kg (244 lb 11.4 oz)   08/18/23 111 kg (245 lb)      Physical Exam  Vitals reviewed.   Constitutional:       General: He is not in acute distress.     Appearance: He is well-developed.   HENT:      Head: Normocephalic and atraumatic.   Neck:      Thyroid: No thyromegaly.       "Vascular: No carotid bruit or JVD.      Trachea: No tracheal deviation.   Cardiovascular:      Rate and Rhythm: Normal rate and regular rhythm.      Pulses: Normal pulses.      Heart sounds: Normal heart sounds. No murmur heard.     No friction rub. No gallop.   Pulmonary:      Effort: Pulmonary effort is normal. No respiratory distress.      Breath sounds: Normal breath sounds. No wheezing, rhonchi or rales.   Chest:      Chest wall: No tenderness.   Abdominal:      General: Bowel sounds are normal. There is no distension.      Palpations: Abdomen is soft.      Tenderness: There is no abdominal tenderness.   Musculoskeletal:      Cervical back: Normal range of motion and neck supple.      Right lower leg: No edema.      Left lower leg: No edema.   Skin:     General: Skin is warm and dry.   Neurological:      General: No focal deficit present.      Mental Status: He is alert and oriented to person, place, and time.      Gait: Gait normal.   Psychiatric:         Mood and Affect: Mood normal.         Behavior: Behavior normal.         Thought Content: Thought content normal.         Judgment: Judgment normal.         ======================================================   Results Review Statement: No pertinent imaging studies reviewed.    Portions of the record may have been created with voice recognition software. Occasional wrong word or \"sound a like\" substitutions may have occurred due to the inherent limitations of voice recognition software. Read the chart carefully and recognize, using context, where substitutions have occurred.    SIGNATURES:   Lopez Billy MD   "

## 2024-12-03 ENCOUNTER — APPOINTMENT (OUTPATIENT)
Dept: LAB | Facility: HOSPITAL | Age: 34
End: 2024-12-03
Payer: COMMERCIAL

## 2024-12-03 DIAGNOSIS — E66.811 OBESITY, CLASS I, BMI 30-34.9: ICD-10-CM

## 2024-12-03 DIAGNOSIS — R07.2 PRECORDIAL CHEST PAIN: ICD-10-CM

## 2024-12-03 LAB
CHOLEST SERPL-MCNC: 178 MG/DL (ref ?–200)
HDLC SERPL-MCNC: 57 MG/DL
LDLC SERPL CALC-MCNC: 107 MG/DL (ref 0–100)
TRIGL SERPL-MCNC: 69 MG/DL (ref ?–150)

## 2024-12-03 PROCEDURE — 80061 LIPID PANEL: CPT

## 2024-12-03 PROCEDURE — 36415 COLL VENOUS BLD VENIPUNCTURE: CPT

## 2024-12-07 ENCOUNTER — RESULTS FOLLOW-UP (OUTPATIENT)
Dept: CARDIOLOGY CLINIC | Facility: CLINIC | Age: 34
End: 2024-12-07

## 2025-01-23 ENCOUNTER — TELEPHONE (OUTPATIENT)
Dept: CARDIOLOGY CLINIC | Facility: CLINIC | Age: 35
End: 2025-01-23

## 2025-04-15 ENCOUNTER — TELEPHONE (OUTPATIENT)
Dept: GASTROENTEROLOGY | Facility: MEDICAL CENTER | Age: 35
End: 2025-04-15

## 2025-04-15 NOTE — TELEPHONE ENCOUNTER
Patient missed appointment scheduled for today with Lilia. Sent pt MYC message to reschedule missed appt.

## 2025-06-28 NOTE — ANESTHESIA PREPROCEDURE EVALUATION
Procedure:  EGD    Relevant Problems   ANESTHESIA (within normal limits)      CARDIO   (+) Hypertension   (-) Angina at rest   (-) Angina of effort   (-) Chest pain   (-) DAVIES (dyspnea on exertion)      ENDO   (-) Diabetes mellitus type 1 (HCC)   (-) Diabetes mellitus, type 2 (HCC)      GI/HEPATIC   (+) GERD (gastroesophageal reflux disease)   (-) Chronic liver disease      /RENAL   (-) Chronic kidney disease      NEURO/PSYCH   (+) Chronic pain   (-) CVA (cerebral vascular accident) (HCC)   (-) Seizures (HCC)      PULMONARY   (+) Obstructive sleep apnea   (-) Asthma   (-) Chronic obstructive pulmonary disease (HCC)        Physical Exam    Airway    Mallampati score: II  TM Distance: >3 FB  Neck ROM: full     Dental   No notable dental hx     Cardiovascular      Pulmonary      Other Findings        Anesthesia Plan  ASA Score- 2     Anesthesia Type- IV sedation with anesthesia with ASA Monitors.         Additional Monitors:     Airway Plan:            Plan Factors-Exercise tolerance (METS): >4 METS.    Chart reviewed.                      Induction- intravenous.    Postoperative Plan-         Informed Consent- Anesthetic plan and risks discussed with patient.  I personally reviewed this patient with the CRNA. Discussed and agreed on the Anesthesia Plan with the CRNA..         [FreeTextEntry2] : follow up Neck pain.

## (undated) DEVICE — TISSUE RETRIEVAL SYSTEM: Brand: INZII RETRIEVAL SYSTEM

## (undated) DEVICE — CHLORAPREP HI-LITE 26ML ORANGE

## (undated) DEVICE — SCD SEQUENTIAL COMPRESSION COMFORT SLEEVE MEDIUM KNEE LENGTH: Brand: KENDALL SCD

## (undated) DEVICE — SUT MONOCRYL 4-0 PS-2 27 IN Y426H

## (undated) DEVICE — TROCAR: Brand: KII FIOS FIRST ENTRY

## (undated) DEVICE — IRRIG ENDO FLO TUBING

## (undated) DEVICE — ADHESIVE SKIN HIGH VISCOSITY EXOFIN 1ML

## (undated) DEVICE — INTENDED FOR TISSUE SEPARATION, AND OTHER PROCEDURES THAT REQUIRE A SHARP SURGICAL BLADE TO PUNCTURE OR CUT.: Brand: BARD-PARKER SAFETY BLADES SIZE 11, STERILE

## (undated) DEVICE — ENDOPATH ETS45 2.5MM RELOADS (VASCULAR/THIN): Brand: ENDOPATH

## (undated) DEVICE — GLOVE INDICATOR PI UNDERGLOVE SZ 6.5 BLUE

## (undated) DEVICE — [HIGH FLOW INSUFFLATOR,  DO NOT USE IF PACKAGE IS DAMAGED,  KEEP DRY,  KEEP AWAY FROM SUNLIGHT,  PROTECT FROM HEAT AND RADIOACTIVE SOURCES.]: Brand: PNEUMOSURE

## (undated) DEVICE — ENDOPATH ETS-FLEX45 ARTICULATING ENDOSCOPIC LINEAR CUTTER, NO RELOAD: Brand: ENDOPATH

## (undated) DEVICE — SUT VICRYL 0 UR-6 27 IN J603H

## (undated) DEVICE — BLUE HEAT SCOPE WARMER

## (undated) DEVICE — ALLENTOWN LAP CHOLE APP PACK: Brand: CARDINAL HEALTH

## (undated) DEVICE — 2000CC GUARDIAN II: Brand: GUARDIAN

## (undated) DEVICE — 3M™ STERI-STRIP™ REINFORCED ADHESIVE SKIN CLOSURES, R1547, 1/2 IN X 4 IN (12 MM X 100 MM), 6 STRIPS/ENVELOPE: Brand: 3M™ STERI-STRIP™

## (undated) DEVICE — PMI DISPOSABLE PUNCTURE CLOSURE DEVICE / SUTURE GRASPER: Brand: PMI

## (undated) DEVICE — GLOVE SRG BIOGEL 6.5

## (undated) DEVICE — ENDOPATH XCEL UNIVERSAL TROCAR STABLILITY SLEEVES: Brand: ENDOPATH XCEL

## (undated) DEVICE — ENDOPATH PNEUMONEEDLE INSUFFLATION NEEDLES WITH LUER LOCK CONNECTORS 120MM: Brand: ENDOPATH

## (undated) DEVICE — HARMONIC ACE +7 LAPAROSCOPIC SHEARS ADVANCED HEMOSTASIS 5MM DIAMETER 36CM SHAFT LENGTH  FOR USE WITH GRAY HAND PIECE ONLY: Brand: HARMONIC ACE